# Patient Record
Sex: FEMALE | Race: WHITE | NOT HISPANIC OR LATINO | Employment: OTHER | URBAN - METROPOLITAN AREA
[De-identification: names, ages, dates, MRNs, and addresses within clinical notes are randomized per-mention and may not be internally consistent; named-entity substitution may affect disease eponyms.]

---

## 2018-02-09 ENCOUNTER — APPOINTMENT (OUTPATIENT)
Dept: WOMENS IMAGING | Facility: HOSPITAL | Age: 83
End: 2018-02-09

## 2018-02-09 PROCEDURE — 77067 SCR MAMMO BI INCL CAD: CPT | Performed by: RADIOLOGY

## 2018-02-09 PROCEDURE — 77063 BREAST TOMOSYNTHESIS BI: CPT | Performed by: RADIOLOGY

## 2018-03-09 ENCOUNTER — OFFICE VISIT (OUTPATIENT)
Dept: ORTHOPEDIC SURGERY | Facility: CLINIC | Age: 83
End: 2018-03-09

## 2018-03-09 VITALS — TEMPERATURE: 97.8 F | WEIGHT: 138 LBS | HEIGHT: 65 IN | BODY MASS INDEX: 22.99 KG/M2

## 2018-03-09 DIAGNOSIS — M25.511 RIGHT SHOULDER PAIN, UNSPECIFIED CHRONICITY: Primary | ICD-10-CM

## 2018-03-09 PROCEDURE — 20610 DRAIN/INJ JOINT/BURSA W/O US: CPT | Performed by: ORTHOPAEDIC SURGERY

## 2018-03-09 PROCEDURE — 73030 X-RAY EXAM OF SHOULDER: CPT | Performed by: ORTHOPAEDIC SURGERY

## 2018-03-09 PROCEDURE — 99203 OFFICE O/P NEW LOW 30 MIN: CPT | Performed by: ORTHOPAEDIC SURGERY

## 2018-03-09 RX ORDER — ALENDRONATE SODIUM 70 MG/1
TABLET ORAL
COMMUNITY
Start: 2018-03-01 | End: 2019-09-09

## 2018-03-09 RX ADMIN — LIDOCAINE HYDROCHLORIDE 2 ML: 10 INJECTION, SOLUTION INFILTRATION; PERINEURAL at 21:15

## 2018-03-09 RX ADMIN — METHYLPREDNISOLONE ACETATE 160 MG: 80 INJECTION, SUSPENSION INTRA-ARTICULAR; INTRALESIONAL; INTRAMUSCULAR; SOFT TISSUE at 21:15

## 2018-03-11 RX ORDER — METHYLPREDNISOLONE ACETATE 80 MG/ML
160 INJECTION, SUSPENSION INTRA-ARTICULAR; INTRALESIONAL; INTRAMUSCULAR; SOFT TISSUE
Status: COMPLETED | OUTPATIENT
Start: 2018-03-09 | End: 2018-03-09

## 2018-03-11 RX ORDER — LIDOCAINE HYDROCHLORIDE 10 MG/ML
2 INJECTION, SOLUTION INFILTRATION; PERINEURAL
Status: COMPLETED | OUTPATIENT
Start: 2018-03-09 | End: 2018-03-09

## 2018-03-12 ENCOUNTER — TELEPHONE (OUTPATIENT)
Dept: ORTHOPEDIC SURGERY | Facility: CLINIC | Age: 83
End: 2018-03-12

## 2018-03-12 NOTE — PROGRESS NOTES
Patient: Barbie Hernandez    YOB: 1932    Medical Record Number: [id number]    Chief Complaints:   Right shoulder pain    History of Present Illness:     85 y.o. female patient who comes in today for evaluation of her right shoulder.  About a year ago she was having some problems with the shoulder.  She rested it and it went away.  About 2 months ago, she started to have pain in the shoulder again.  She tells me that she took a turmeric tea and her symptoms resolved.  Roughly 2 days ago, she started to have pain in the shoulder yet again.  She localizes her symptoms to the side of the shoulder.  It is worse with reaching and lifting.  She describes her pain as moderate to severe, intermittent, and both aching and burning.  Rest and Tylenol have helped tremendously.    Allergies:   Allergies   Allergen Reactions   • Ciprofloxacin Rash   • Medrol [Methylprednisolone] Rash     Unknown    • Naproxen Rash   • Sulfa Antibiotics Nausea Only   • Vancomycin Rash       Home Medications:    Current Outpatient Prescriptions:   •  alendronate (FOSAMAX) 70 MG tablet, , Disp: , Rfl:   •  CALCIUM PO, Take  by mouth., Disp: , Rfl:   •  Cholecalciferol (VITAMIN D3) 5000 UNITS capsule capsule, Take 5,000 Units by mouth daily., Disp: , Rfl:   •  levothyroxine (SYNTHROID, LEVOTHROID) 112 MCG tablet, Take 112 mcg by mouth daily., Disp: , Rfl:     Past Medical History:   Diagnosis Date   • Disease of thyroid gland    • Hyperlipidemia        No past surgical history on file.    Social History     Occupational History   • Not on file.     Social History Main Topics   • Smoking status: Never Smoker   • Smokeless tobacco: Not on file   • Alcohol use No   • Drug use: Unknown   • Sexual activity: Not on file      Social History     Social History Narrative   • No narrative on file       No family history on file.    Review of Systems:      Constitutional: Denies fever, shaking or chills   Eyes: Denies change in visual acuity  "  HEENT: Denies nasal congestion or sore throat   Respiratory: Denies cough or shortness of breath   Cardiovascular: Denies chest pain or edema  Endocrine: Denies tremors, palpitations, intolerance of heat or cold, polyuria, polydipsia.  GI: Denies abdominal pain, nausea, vomiting, bloody stools or diarrhea  : Denies frequency, urgency, incontinence, retention, or nocturia.  Musculoskeletal: Denies numbness tingling or loss of motor function except as above  Integument: Denies rash, lesion or ulceration   Neurologic: Denies headache or focal weakness, deficits  Heme: Denies epistaxis, spontaneous or excessive bleeding, epistaxis, hematuria, melena, fatigue, enlarged or tender lymph nodes.      All other pertinent positives and negatives as noted above in HPI.    Physical Exam: 85 y.o. female  Vitals:    03/09/18 1604   Temp: 97.8 °F (36.6 °C)   TempSrc: Temporal Artery    Weight: 62.6 kg (138 lb)   Height: 163.8 cm (64.5\")       General:  Patient is awake and alert.  Appears in no acute distress or discomfort.    Psych:  Affect and demeanor are appropriate.    Eyes:  Conjunctiva and sclera appear grossly normal.  Eyes track well and EOM seem to be intact.    Ears:  No gross abnormalities.  Hearing adequate for the exam.    Cardiovascular:  Regular rate and rhythm.    Lungs:  Good chest expansion.  Breathing unlabored.    Lymph:  No palpable adenopathy about neck or axilla.    Neck:  Supple.  Normal ROM.  Negative Spurling's for shoulder or arm pain.    Right upper extremity:  Skin benign and intact without evidence for swelling, masses or atrophy.  No palpable masses.  No focal areas of exquisite tenderness.  Full active ROM.  No evident instability or apprehension.  Positive Neer and Meneses impingement maneuvers.  Negative Speeds, Yergason's and active compression maneuvers.  Pain with resistive testing of elevation in scapular plane and external rotation.  Strength is difficult to assess but she seems to be " able to resist with at least 5 minus out of 5 strength.  Negative Hornblower's and ER lag sign.  Good strength in wrist and hand.  Intact sensation in arm, hand.  Palpable radial pulse with brisk cap refill.         Radiology:   AP, scapular Y, and axillary views of the right shoulder are ordered by myself and reviewed to evaluate the patient's complaint.  No comparison films are immediately available.  The x-rays show no obvious acute abnormalities, lesions, masses, significant degenerative changes, or other concerning findings.  The acromiohumeral interval is normal.  Glenoid version appears normal as well.    Assessment/Plan:   Right rotator cuff tendinitis    Discussed options in detail including conservative versus surgical options. I have recommended that we start with a conservative approach. With regards to conservative options, we discussed appropriate activity modifications, icing as needed, anti-inflammatories, physical therapy, and injections.  Patient has acknowledged understanding of this information and stated that she would like to try an injection.  The risks, benefits, and alternatives were discussed.  She consented.  If her symptoms persist and/or recur, I told her I will be happy to see her back.  Otherwise, she can follow-up as needed.    Large Joint Arthrocentesis  Date/Time: 3/9/2018 9:15 PM  Consent given by: patient  Site marked: site marked  Timeout: Immediately prior to procedure a time out was called to verify the correct patient, procedure, equipment, support staff and site/side marked as required   Supporting Documentation  Indications: pain and joint swelling   Procedure Details  Location: shoulder - R subacromial bursa  Preparation: Patient was prepped and draped in the usual sterile fashion  Needle size: 25 G  Approach: posterior  Medications administered: 2 mL lidocaine 1 %; 160 mg methylPREDNISolone acetate 80 MG/ML  Patient tolerance: patient tolerated the procedure well with no  immediate complications            Juliano Bañuelos MD    03/09/2018    CC to Clara Ann Pallares, MD

## 2018-03-12 NOTE — TELEPHONE ENCOUNTER
This is common and likely related to the steroids that we injected.  This should get better over the next day or so.  She can take Benadryl which will help.  She can use her arm normally.  If she does not get better with the shot, we can get a CT scan instead of an MRI.  Thanks.

## 2018-04-27 ENCOUNTER — OFFICE VISIT (OUTPATIENT)
Dept: ORTHOPEDIC SURGERY | Facility: CLINIC | Age: 83
End: 2018-04-27

## 2018-04-27 VITALS — BODY MASS INDEX: 24.41 KG/M2 | TEMPERATURE: 98.1 F | WEIGHT: 137.8 LBS | HEIGHT: 63 IN

## 2018-04-27 DIAGNOSIS — G89.29 CHRONIC RIGHT SHOULDER PAIN: Primary | ICD-10-CM

## 2018-04-27 DIAGNOSIS — M25.511 CHRONIC RIGHT SHOULDER PAIN: Primary | ICD-10-CM

## 2018-04-27 PROCEDURE — 99212 OFFICE O/P EST SF 10 MIN: CPT | Performed by: ORTHOPAEDIC SURGERY

## 2018-04-27 NOTE — PROGRESS NOTES
Chief Complaint:  Follow-up regarding right shoulder pain    HPI:  Mrs. Hernandez comes in today for follow-up of the right shoulder.  The last injection helped tremendously.  Her pain has now recurred.  He comes in today to discuss other options.    Exam:  Right shoulder is examined.  Skin is benign.  No areas of tenderness.  No effusion.  Good motion.  Positive impingement maneuvers.  She has discomfort with elevation in the scapular plane and 4 out of 5 strength    Imaging:  none    Assessment:  Right rotator cuff tear    Plan:  We discussed options for her.  She wants to get another injection.  I explained that this is too soon.  I recommend physical therapy.  She was given a referral for this.  I told her that I can reinject the shoulder in another month.    Juliano Bañuelos MD  04/27/2018

## 2018-05-10 ENCOUNTER — TREATMENT (OUTPATIENT)
Dept: PHYSICAL THERAPY | Facility: CLINIC | Age: 83
End: 2018-05-10

## 2018-05-10 DIAGNOSIS — M25.511 CHRONIC RIGHT SHOULDER PAIN: Primary | ICD-10-CM

## 2018-05-10 DIAGNOSIS — G89.29 CHRONIC RIGHT SHOULDER PAIN: Primary | ICD-10-CM

## 2018-05-10 PROCEDURE — G8985 CARRY GOAL STATUS: HCPCS | Performed by: PHYSICAL THERAPIST

## 2018-05-10 PROCEDURE — 97161 PT EVAL LOW COMPLEX 20 MIN: CPT | Performed by: PHYSICAL THERAPIST

## 2018-05-10 PROCEDURE — 97110 THERAPEUTIC EXERCISES: CPT | Performed by: PHYSICAL THERAPIST

## 2018-05-10 PROCEDURE — G8984 CARRY CURRENT STATUS: HCPCS | Performed by: PHYSICAL THERAPIST

## 2018-05-15 ENCOUNTER — TREATMENT (OUTPATIENT)
Dept: PHYSICAL THERAPY | Facility: CLINIC | Age: 83
End: 2018-05-15

## 2018-05-15 DIAGNOSIS — M25.511 CHRONIC RIGHT SHOULDER PAIN: Primary | ICD-10-CM

## 2018-05-15 DIAGNOSIS — G89.29 CHRONIC RIGHT SHOULDER PAIN: Primary | ICD-10-CM

## 2018-05-15 PROCEDURE — 97530 THERAPEUTIC ACTIVITIES: CPT | Performed by: PHYSICAL THERAPIST

## 2018-05-15 PROCEDURE — 97140 MANUAL THERAPY 1/> REGIONS: CPT | Performed by: PHYSICAL THERAPIST

## 2018-05-15 PROCEDURE — 97110 THERAPEUTIC EXERCISES: CPT | Performed by: PHYSICAL THERAPIST

## 2018-05-15 NOTE — PROGRESS NOTES
Physical Therapy Daily Progress Note  2 treatments  Subjective     Barbie Hernandez reports: Patient reports that she has minimal pain over the weekend but that her shoulder is having increased pain on this date. She was only to do about half of her HEP but she did note a significant improvement.         Objective   See Exercise, Manual, and Modality Logs for complete treatment.   AROM and AAROM notably improved during wall slides today.     Assessment/Plan  Patient tolerated all treatment well and was able to tolerate progressions in therapeutic exercises with mild discomfort. Patient continues to need skilled therapy to improve R shoulder mobility, AROM, and activity tolerance. Patient is progressing well at this time. Will continue to advance POC as tolerated.     Progress per Plan of Care and Progress strengthening /stabilization /functional activity           Manual Therapy:    8     mins  49514;  Therapeutic Exercise:    20     mins  32015;     Neuromuscular Francesco:        mins  82963;    Therapeutic Activity:     10     mins  82911;     Gait Training:           mins  12753;     Ultrasound:          mins  33250;    Electrical Stimulation:         mins  07138 ( );  Dry Needling          mins self-pay    Timed Treatment:   38   mins   Total Treatment:     42   mins    David Guadarrama PT DPT  Physical Therapist  [place] License # [id number]

## 2018-05-17 ENCOUNTER — OFFICE VISIT (OUTPATIENT)
Dept: PHYSICAL THERAPY | Facility: CLINIC | Age: 83
End: 2018-05-17

## 2018-05-17 DIAGNOSIS — M25.511 CHRONIC RIGHT SHOULDER PAIN: Primary | ICD-10-CM

## 2018-05-17 DIAGNOSIS — G89.29 CHRONIC RIGHT SHOULDER PAIN: Primary | ICD-10-CM

## 2018-05-17 PROCEDURE — 97110 THERAPEUTIC EXERCISES: CPT | Performed by: PHYSICAL THERAPIST

## 2018-05-17 PROCEDURE — 97530 THERAPEUTIC ACTIVITIES: CPT | Performed by: PHYSICAL THERAPIST

## 2018-05-17 NOTE — PROGRESS NOTES
"Physical Therapy Daily Progress Note    Time In 0958  Time Out 1054    Barbie Hernandez reports: shoulder feels a little \"looser\" but still uncomfortable with certain positions/movements.    Subjective     Objective   See Exercise, Manual, and Modality Logs for complete treatment.   Added cane assisted ER and IR x 10 reps each direction.  Verbal and tactile cues to ensure proper scapular postioning with band activities.      Assessment/Plan  Mild decrease in subjective complaints of shoulder discomfort.  Benefits from verbal/tactile cues to ensure proper scapular positioning with exercises and activities. Able to perform exercises for affected musculature without increased symptoms or discomfort though limitations in ROM still evident with certain AA/PROM planes of movement.    Progress strengthening /stabilization /functional activity as symptoms allow           Manual Therapy:  8     mins  85475;  Therapeutic Exercise:    28   mins  39364;     Neuromuscular Francesco:        mins  44207;    Therapeutic Activity:    10    mins  29407;     Gait Training:         mins  17161;     Ultrasound:          mins  85881;    Electrical Stimulation:      mins  83404 ( );      Timed Treatment:   46   mins   Total Treatment:     54   mins    Hardy Macedo PTA    Physical Therapist Assistant [place] [facility]    "

## 2018-05-22 ENCOUNTER — OFFICE VISIT (OUTPATIENT)
Dept: PHYSICAL THERAPY | Facility: CLINIC | Age: 83
End: 2018-05-22

## 2018-05-22 DIAGNOSIS — G89.29 CHRONIC RIGHT SHOULDER PAIN: Primary | ICD-10-CM

## 2018-05-22 DIAGNOSIS — M25.511 CHRONIC RIGHT SHOULDER PAIN: Primary | ICD-10-CM

## 2018-05-22 PROCEDURE — 97110 THERAPEUTIC EXERCISES: CPT | Performed by: PHYSICAL THERAPIST

## 2018-05-22 PROCEDURE — 97140 MANUAL THERAPY 1/> REGIONS: CPT | Performed by: PHYSICAL THERAPIST

## 2018-05-22 PROCEDURE — 97530 THERAPEUTIC ACTIVITIES: CPT | Performed by: PHYSICAL THERAPIST

## 2018-05-22 NOTE — PROGRESS NOTES
Physical Therapy Daily Progress Note    Time In 1110  Time Out 1215    Barbiehector Freirebarb reports: shoulder mobility seems a little better but still experiencing pain.    Subjective     Objective   See Exercise, Manual, and Modality Logs for complete treatment.   UBE 3 fwd/3 retro  3 way pulleys x 2 min each direction  Red t band scapular retraction rows, saws, bilateral shoulder extension 2 x 10 each   Added Bilateral scapular retraction/ER red t band x 10   Unilateral wall slides flexion x 10 and unilateral ball circles 20 cw/ccw.  Verbal and tactile cues regarding scapular posture/positioning with exercise      Assessment/Plan  Subjectively, patient reports mobility of shoulder mildly improved but pain seems unchanged.  Able to perform exercises as described above without increased symptoms/disomfort.  Does benefit from verbal/tactile cues regarding posture and scapular positioning.  PROM reveals good mobility without noted restrictions all planes.    Progress strengthening /stabilization /functional activity as symptoms allow           Manual Therapy:   10    mins  12660;  Therapeutic Exercise:   25      mins  34614;     Neuromuscular Francesco:      mins  72530;    Therapeutic Activity:    10    mins  19515;     Gait Training:         mins  99420;     Ultrasound:          mins  94483;    Electrical Stimulation:         mins  56076 ( );      Timed Treatment: 45   mins   Total Treatment:  65    mins    Hardy Macedo PTA    Physical Therapist Assistant [place] [facility]

## 2018-05-24 ENCOUNTER — TREATMENT (OUTPATIENT)
Dept: PHYSICAL THERAPY | Facility: CLINIC | Age: 83
End: 2018-05-24

## 2018-05-24 DIAGNOSIS — M25.511 CHRONIC RIGHT SHOULDER PAIN: Primary | ICD-10-CM

## 2018-05-24 DIAGNOSIS — G89.29 CHRONIC RIGHT SHOULDER PAIN: Primary | ICD-10-CM

## 2018-05-24 PROCEDURE — 97110 THERAPEUTIC EXERCISES: CPT | Performed by: PHYSICAL THERAPIST

## 2018-05-24 PROCEDURE — 97140 MANUAL THERAPY 1/> REGIONS: CPT | Performed by: PHYSICAL THERAPIST

## 2018-05-24 PROCEDURE — 97530 THERAPEUTIC ACTIVITIES: CPT | Performed by: PHYSICAL THERAPIST

## 2018-05-24 NOTE — PROGRESS NOTES
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 She reports that her shoulder is feeling better and she is having less difficulty with ADLs on a regular basis.         Objective   See Exercise, Manual, and Modality Logs for complete treatment.       Assessment/Plan   Patient continues to have significant weakness in R shoulder RTC ER's. She is unable to elevate her arm against resistance but is able to complete wall slides now through a full ROM. She is progressing with her AROM and AAROM at this time and is improving shoulder function with current POC. Patient tolerated all treatment well and was able to tolerate progressions in therapeutic exercises with mild discomfort.  Will continue to advance POC as tolerated.     Progress per Plan of Care and Progress strengthening /stabilization /functional activity           Manual Therapy:    8     mins  05995;  Therapeutic Exercise:    16     mins  47327;     Neuromuscular Francesco:        mins  40723;    Therapeutic Activity:     16     mins  67540;     Gait Training:           mins  54221;     Ultrasound:          mins  22225;    Electrical Stimulation:         mins  65551 ( );  Dry Needling          mins self-pay    Timed Treatment:   40   mins   Total Treatment:     42   mins    David Guadarrama PT DPT  Physical Therapist  [place] License # [id number]

## 2018-05-29 ENCOUNTER — TREATMENT (OUTPATIENT)
Dept: PHYSICAL THERAPY | Facility: CLINIC | Age: 83
End: 2018-05-29

## 2018-05-29 DIAGNOSIS — G89.29 CHRONIC RIGHT SHOULDER PAIN: Primary | ICD-10-CM

## 2018-05-29 DIAGNOSIS — M25.511 CHRONIC RIGHT SHOULDER PAIN: Primary | ICD-10-CM

## 2018-05-29 PROCEDURE — 97530 THERAPEUTIC ACTIVITIES: CPT | Performed by: PHYSICAL THERAPIST

## 2018-05-29 PROCEDURE — 97110 THERAPEUTIC EXERCISES: CPT | Performed by: PHYSICAL THERAPIST

## 2018-05-29 NOTE — PROGRESS NOTES
Physical Therapy Daily Progress Note  5 treatments  Subjective     Barbie Hernandez reports: patient reports that she has had laryngitis over the past week. she has also had no AC and has had to sleep on her couch to stay cool. Reports that her main shoulder pain is while driving.          Objective   See Exercise, Manual, and Modality Logs for complete treatment.       Assessment/Plan   Patient is improving on all exercises. Her ROM and strength is gradually increasing each treatment.  Patient tolerated all treatment well and was able to tolerate progressions in therapeutic exercises with minimal discomfort. Patient continues to need skilled therapy to improve shoulder strength, shoulder function, and shoulder pain. Patient is progressing well at this time. Will continue to advance POC as tolerated.     Progress per Plan of Care and Progress strengthening /stabilization /functional activity           Manual Therapy:         mins  42116;  Therapeutic Exercise:    18     mins  98737;     Neuromuscular Francesco:        mins  02970;    Therapeutic Activity:     20     mins  00971;     Gait Training:           mins  28495;     Ultrasound:          mins  44094;    Electrical Stimulation:         mins  49230 ( );  Dry Needling          mins self-pay    Timed Treatment:   38 mins   Total Treatment:     45   mins    David Guadarrama PT DPT  Physical Therapist  [place] License # [id number]

## 2018-05-31 ENCOUNTER — TREATMENT (OUTPATIENT)
Dept: PHYSICAL THERAPY | Facility: CLINIC | Age: 83
End: 2018-05-31

## 2018-05-31 DIAGNOSIS — G89.29 CHRONIC RIGHT SHOULDER PAIN: Primary | ICD-10-CM

## 2018-05-31 DIAGNOSIS — M25.511 CHRONIC RIGHT SHOULDER PAIN: Primary | ICD-10-CM

## 2018-05-31 PROCEDURE — 97110 THERAPEUTIC EXERCISES: CPT | Performed by: PHYSICAL THERAPIST

## 2018-05-31 NOTE — PROGRESS NOTES
Physical Therapy Daily Progress Note  7 treatments  Subjective     Barbie Hernandez reports: Patient reports that she is having a HA today and is worried that her laryngitis is not gone. She is feeling a little tired.         Objective   See Exercise, Manual, and Modality Logs for complete treatment.    All TE were performed on this date as listed in the program notes in patient instructions.     Went over HEP with patient and organized her program for exercises that she is to continue progressing with once therapy is over.   Assessment/Plan  Patient tolerated all treatment well and was able to tolerate progressions in therapeutic exercises with mild discomfort. R shoulder ROM is improving. Patient was instructed to perform this routine again prior to her next appointment to assess (I) with TE. Will continue to advance POC as tolerated.     Progress per Plan of Care and Progress strengthening /stabilization /functional activity           Manual Therapy:         mins  20435;  Therapeutic Exercise:    42     mins  12831;     Neuromuscular Francesco:        mins  40905;    Therapeutic Activity:          mins  91882;     Gait Training:           mins  56647;     Ultrasound:          mins  17026;    Electrical Stimulation:         mins  43130 ( );  Dry Needling          mins self-pay    Timed Treatment:   42   mins   Total Treatment:     45   mins    David Guadarrama PT DPT  Physical Therapist  [place] License # [id number]

## 2018-05-31 NOTE — PATIENT INSTRUCTIONS
Access Code: [email or web address]   URL: [email or web address]   Date: 05/31/2018   Prepared by: David Guadarrama     Exercises  Seated Shoulder Scaption AAROM with Pulley at Side - 40 reps - 1 sets - 2 hold - 1x daily - 5x weekly  Sidelying Shoulder External Rotation - 12 reps - 2 sets - 2 hold - 1x daily - 5x weekly  Seated Shoulder Flexion AAROM with Dowel - 12 reps - 2 sets - 2 hold - 1x daily - 5x weekly  Shoulder Scaption AAROM with Dowel - 12 reps - 2 sets - 2 hold - 1x daily - 5x weekly  Shoulder Extension with Resistance - 12 reps - 2 sets - 2 hold - 1x daily - 5x weekly  Standing Shoulder Row with Anchored Resistance - 12 reps - 2 sets - 2 hold - 1x daily - 5x weekly  Standing Bicep Curls with Resistance - 12 reps - 2 sets - 2 hold - 1x daily - 5x weekly  Scaption Wall Slide with Towel - 12 reps - 2 sets - 2 hold - 1x daily                            - 5x weekly

## 2018-06-05 ENCOUNTER — TREATMENT (OUTPATIENT)
Dept: PHYSICAL THERAPY | Facility: CLINIC | Age: 83
End: 2018-06-05

## 2018-06-05 DIAGNOSIS — M25.511 CHRONIC RIGHT SHOULDER PAIN: Primary | ICD-10-CM

## 2018-06-05 DIAGNOSIS — G89.29 CHRONIC RIGHT SHOULDER PAIN: Primary | ICD-10-CM

## 2018-06-05 PROCEDURE — 97110 THERAPEUTIC EXERCISES: CPT | Performed by: PHYSICAL THERAPIST

## 2018-06-05 PROCEDURE — 97530 THERAPEUTIC ACTIVITIES: CPT | Performed by: PHYSICAL THERAPIST

## 2018-06-05 NOTE — PROGRESS NOTES
Physical Therapy Daily Progress Note  8 treatments  Subjective     Barbie Hernandez reports: She has been feeling well. Stayed busy over the weekend. Continues to have pain and weakness in her R shoulder. She has been noting improvement in her ability to reach out (putting key in ignition) without difficulty.         Objective   See Exercise, Manual, and Modality Logs for complete treatment.     Strength and AROM are gradually improving. Isolated strength continues to be very limited however functionally she is     Assessment/Plan  Added in SB rolling exercises. Patient tolerated all treatment well and was able to tolerate progressions in therapeutic exercises with minimal discomfort. Patient continues to need skilled therapy to improve shoulder strength, shoulder functional mobility, and pain. Patient is progressing gradually at this time. Will continue to advance POC as tolerated.     Progress per Plan of Care and Progress strengthening /stabilization /functional activity           Manual Therapy:         mins  62761;  Therapeutic Exercise:    24     mins  05278;     Neuromuscular Francesco:       mins  73624;    Therapeutic Activity:     18     mins  49899;     Gait Training:           mins  89004;     Ultrasound:    7      mins  16739;    Electrical Stimulation:         mins  28370 ( );  Dry Needling          mins self-pay    Timed Treatment:   49   mins   Total Treatment:     52   mins    David Guadarrama PT DPT  Physical Therapist  [place] License # [id number]

## 2018-06-07 ENCOUNTER — TREATMENT (OUTPATIENT)
Dept: PHYSICAL THERAPY | Facility: CLINIC | Age: 83
End: 2018-06-07

## 2018-06-07 DIAGNOSIS — G89.29 CHRONIC RIGHT SHOULDER PAIN: Primary | ICD-10-CM

## 2018-06-07 DIAGNOSIS — M25.511 CHRONIC RIGHT SHOULDER PAIN: Primary | ICD-10-CM

## 2018-06-07 PROCEDURE — 97530 THERAPEUTIC ACTIVITIES: CPT | Performed by: PHYSICAL THERAPIST

## 2018-06-07 PROCEDURE — 97110 THERAPEUTIC EXERCISES: CPT | Performed by: PHYSICAL THERAPIST

## 2018-06-07 NOTE — PROGRESS NOTES
Physical Therapy Daily Progress Note  9 treatments  Subjective     Barbie Hernandez reports: she was not sore after last treatment.         Objective   See Exercise, Manual, and Modality Logs for complete treatment.       Assessment/Plan  Patient continues to respond well to TE variety and not have post treatment soreness elevate from baseline. Isolated ER and flexion are still very limited. Patient tolerated all treatment well and was able to tolerate progressions in therapeutic exercises with mild discomfort. Patient continues to need skilled therapy to improve R shoulder strength and functional mobility. Patient is progressing gradually at this time. Will continue to advance POC as tolerated.     Progress per Plan of Care and Progress strengthening /stabilization /functional activity           Manual Therapy:         mins  78801;  Therapeutic Exercise:    16     mins  15981;     Neuromuscular Francesco:        mins  56172;    Therapeutic Activity:     18     mins  02719;     Gait Training:           mins  89617;     Ultrasound:          mins  30176;    Electrical Stimulation:         mins  27523 ( );  Dry Needling          mins self-pay    Timed Treatment:   34   mins   Total Treatment:     38   mins    David Guadarrama PT DPT  Physical Therapist  [place] License # [id number]

## 2018-06-08 ENCOUNTER — OFFICE VISIT (OUTPATIENT)
Dept: ORTHOPEDIC SURGERY | Facility: CLINIC | Age: 83
End: 2018-06-08

## 2018-06-08 VITALS — WEIGHT: 136.4 LBS | BODY MASS INDEX: 23.29 KG/M2 | TEMPERATURE: 98.1 F | HEIGHT: 64 IN

## 2018-06-08 DIAGNOSIS — M25.511 CHRONIC RIGHT SHOULDER PAIN: Primary | ICD-10-CM

## 2018-06-08 DIAGNOSIS — G89.29 CHRONIC RIGHT SHOULDER PAIN: Primary | ICD-10-CM

## 2018-06-08 PROCEDURE — 20610 DRAIN/INJ JOINT/BURSA W/O US: CPT | Performed by: ORTHOPAEDIC SURGERY

## 2018-06-08 RX ADMIN — LIDOCAINE HYDROCHLORIDE 2 ML: 20 INJECTION, SOLUTION EPIDURAL; INFILTRATION; INTRACAUDAL; PERINEURAL at 11:33

## 2018-06-08 RX ADMIN — METHYLPREDNISOLONE ACETATE 80 MG: 80 INJECTION, SUSPENSION INTRA-ARTICULAR; INTRALESIONAL; INTRAMUSCULAR; SOFT TISSUE at 11:33

## 2018-06-08 NOTE — PROGRESS NOTES
Ms. Hernandez comes in today wanting to get the right shoulder injected again.  She has no interest in pursuing further workup or intervention.  The risks, benefits, and alternatives to a repeat injection were discussed.  She consented.  Going forward, she will follow up as needed.    Large Joint Arthrocentesis  Date/Time: 6/8/2018 11:33 AM  Consent given by: patient  Site marked: site marked  Timeout: Immediately prior to procedure a time out was called to verify the correct patient, procedure, equipment, support staff and site/side marked as required   Supporting Documentation  Indications: pain   Procedure Details  Location: shoulder - R subacromial bursa  Preparation: Patient was prepped and draped in the usual sterile fashion  Needle gauge: 21 g.  Approach: posterior  Medications administered: 2 mL lidocaine PF 2% 2 %; 80 mg methylPREDNISolone acetate 80 MG/ML  Patient tolerance: patient tolerated the procedure well with no immediate complications

## 2018-06-09 RX ORDER — LIDOCAINE HYDROCHLORIDE 20 MG/ML
2 INJECTION, SOLUTION EPIDURAL; INFILTRATION; INTRACAUDAL; PERINEURAL
Status: COMPLETED | OUTPATIENT
Start: 2018-06-08 | End: 2018-06-08

## 2018-06-09 RX ORDER — METHYLPREDNISOLONE ACETATE 80 MG/ML
80 INJECTION, SUSPENSION INTRA-ARTICULAR; INTRALESIONAL; INTRAMUSCULAR; SOFT TISSUE
Status: COMPLETED | OUTPATIENT
Start: 2018-06-08 | End: 2018-06-08

## 2018-07-11 ENCOUNTER — CONSULT (OUTPATIENT)
Dept: ORTHOPEDIC SURGERY | Facility: CLINIC | Age: 83
End: 2018-07-11

## 2018-07-11 VITALS — WEIGHT: 137.8 LBS | HEIGHT: 64 IN | BODY MASS INDEX: 23.52 KG/M2 | TEMPERATURE: 97.4 F

## 2018-07-11 DIAGNOSIS — M54.50 SPINE PAIN, LUMBAR: Primary | ICD-10-CM

## 2018-07-11 DIAGNOSIS — M48.061 SPINAL STENOSIS OF LUMBAR REGION, UNSPECIFIED WHETHER NEUROGENIC CLAUDICATION PRESENT: ICD-10-CM

## 2018-07-11 DIAGNOSIS — M41.9 ACQUIRED SCOLIOSIS: ICD-10-CM

## 2018-07-11 PROCEDURE — 72100 X-RAY EXAM L-S SPINE 2/3 VWS: CPT | Performed by: ORTHOPAEDIC SURGERY

## 2018-07-11 PROCEDURE — 99214 OFFICE O/P EST MOD 30 MIN: CPT | Performed by: ORTHOPAEDIC SURGERY

## 2018-07-11 NOTE — PROGRESS NOTES
"New patient or new problem visit    Chief Complaint   Patient presents with   • Lumbar Spine - Establish Care, Pain       HPI: She complains of \"gimpy leg\"but as she maintains a right leg Bear weight and hurts occasional pain on the other side some back pain as well in the past but none recently.  His go therapy was just started without much relief explore osteo-process    PFSH: See chart- reviewed    Review of Systems   Constitutional: Negative for activity change, appetite change, chills, diaphoresis, fatigue, fever and unexpected weight change.   HENT: Positive for hearing loss. Negative for congestion, nosebleeds, postnasal drip, sore throat, tinnitus and trouble swallowing.    Eyes: Positive for visual disturbance. Negative for pain.   Respiratory: Positive for shortness of breath. Negative for apnea, cough, chest tightness and wheezing.    Cardiovascular: Negative for chest pain and palpitations.   Gastrointestinal: Negative for abdominal pain, blood in stool, diarrhea, nausea and vomiting.   Genitourinary: Negative for difficulty urinating and dysuria.   Musculoskeletal: Positive for back pain and gait problem. Negative for arthralgias, joint swelling and myalgias.   Skin: Negative for color change and rash.   Neurological: Positive for numbness. Negative for light-headedness and headaches.   Hematological: Bruises/bleeds easily.   Psychiatric/Behavioral: Negative for agitation and sleep disturbance. The patient is not nervous/anxious.        PE: Constitutional: Vital signs above-noted.  Awake, alert and oriented    Psychiatric: Affect and insight do not appear grossly disturbed.    Pulmonary: Breathing is unlabored, color is good.    Skin: Warm, dry and normal turgor    Cardiac:  pedal pulses intact.  No edema.    Eyesight and hearing appear adequate for examination purposes      Musculoskeletal:  There is no tenderness to percussion and palpation of the spine. Motion appears undisturbed.  Posture is " unremarkable to coronal and sagittal inspection.    The skin about the area is intact.  There is no palpable or visible deformity.  There is no local spasm.       Neurologic:   Reflexes are 2+ and symmetrical in the patellae and absent in the Achilles.   Motor function is undisturbed in quadriceps, EHL, and gastrocnemius      Sensation appears symmetrically intact to light touch   .  In the bilateral lower extremities there is no evidence of atrophy.   Clonus is absent..  Gait appears undisturbed.  SLR test negative      MEDICAL DECISION MAKING    XRAY: Plain film x-rays of the lumbar spine scoliosis and multilevel disc degeneration but no obvious evidence of fracture.  Comparison views are available.    Other: n/a    Impression: He probably has lumbar spinal stenosis and she certainly has disc degeneration and scoliosis.  She can't get the MRI scan because of the indwelling pacemaker I don't think she's going to want surgery so for now I'll were going to simply send her for epidural injections.  If she fails to improve a myelogram and CT scan will be useful for further diagnosis    Plan: As above

## 2018-08-03 ENCOUNTER — ANESTHESIA (OUTPATIENT)
Dept: PAIN MEDICINE | Facility: HOSPITAL | Age: 83
End: 2018-08-03

## 2018-08-03 ENCOUNTER — ANESTHESIA EVENT (OUTPATIENT)
Dept: PAIN MEDICINE | Facility: HOSPITAL | Age: 83
End: 2018-08-03

## 2018-08-03 ENCOUNTER — HOSPITAL ENCOUNTER (OUTPATIENT)
Dept: GENERAL RADIOLOGY | Facility: HOSPITAL | Age: 83
Discharge: HOME | End: 2018-08-03

## 2018-08-03 ENCOUNTER — HOSPITAL ENCOUNTER (OUTPATIENT)
Dept: PAIN MEDICINE | Facility: HOSPITAL | Age: 83
Discharge: HOME | End: 2018-08-03
Attending: ORTHOPAEDIC SURGERY | Admitting: ANESTHESIOLOGY

## 2018-08-03 VITALS
DIASTOLIC BLOOD PRESSURE: 72 MMHG | HEART RATE: 79 BPM | SYSTOLIC BLOOD PRESSURE: 119 MMHG | WEIGHT: 137 LBS | OXYGEN SATURATION: 96 % | RESPIRATION RATE: 16 BRPM | TEMPERATURE: 97.8 F | HEIGHT: 65 IN | BODY MASS INDEX: 22.82 KG/M2

## 2018-08-03 DIAGNOSIS — R52 PAIN: ICD-10-CM

## 2018-08-03 DIAGNOSIS — M48.061 SPINAL STENOSIS OF LUMBAR REGION, UNSPECIFIED WHETHER NEUROGENIC CLAUDICATION PRESENT: ICD-10-CM

## 2018-08-03 PROCEDURE — 0 IOPAMIDOL 41 % SOLUTION: Performed by: ANESTHESIOLOGY

## 2018-08-03 PROCEDURE — 25010000002 METHYLPREDNISOLONE PER 80 MG: Performed by: ANESTHESIOLOGY

## 2018-08-03 PROCEDURE — C1755 CATHETER, INTRASPINAL: HCPCS

## 2018-08-03 PROCEDURE — 77003 FLUOROGUIDE FOR SPINE INJECT: CPT

## 2018-08-03 RX ORDER — METHYLPREDNISOLONE ACETATE 80 MG/ML
80 INJECTION, SUSPENSION INTRA-ARTICULAR; INTRALESIONAL; INTRAMUSCULAR; SOFT TISSUE ONCE
Status: COMPLETED | OUTPATIENT
Start: 2018-08-03 | End: 2018-08-03

## 2018-08-03 RX ORDER — SODIUM CHLORIDE 0.9 % (FLUSH) 0.9 %
1-10 SYRINGE (ML) INJECTION AS NEEDED
Status: DISCONTINUED | OUTPATIENT
Start: 2018-08-03 | End: 2018-08-04 | Stop reason: HOSPADM

## 2018-08-03 RX ORDER — MIDAZOLAM HYDROCHLORIDE 1 MG/ML
1 INJECTION INTRAMUSCULAR; INTRAVENOUS AS NEEDED
Status: DISCONTINUED | OUTPATIENT
Start: 2018-08-03 | End: 2018-08-04 | Stop reason: HOSPADM

## 2018-08-03 RX ORDER — DEXAMETHASONE SODIUM PHOSPHATE 10 MG/ML
10 INJECTION, SOLUTION INTRAMUSCULAR; INTRAVENOUS ONCE
Status: DISCONTINUED | OUTPATIENT
Start: 2018-08-03 | End: 2018-08-03

## 2018-08-03 RX ORDER — LIDOCAINE HYDROCHLORIDE 10 MG/ML
1 INJECTION, SOLUTION INFILTRATION; PERINEURAL ONCE AS NEEDED
Status: DISCONTINUED | OUTPATIENT
Start: 2018-08-03 | End: 2018-08-04 | Stop reason: HOSPADM

## 2018-08-03 RX ORDER — CARVEDILOL 3.12 MG/1
3.12 TABLET ORAL 2 TIMES DAILY WITH MEALS
COMMUNITY

## 2018-08-03 RX ADMIN — IOPAMIDOL 10 ML: 408 INJECTION, SOLUTION INTRATHECAL at 11:26

## 2018-08-03 RX ADMIN — METHYLPREDNISOLONE ACETATE 80 MG: 80 INJECTION, SUSPENSION INTRA-ARTICULAR; INTRALESIONAL; INTRAMUSCULAR; SOFT TISSUE at 11:26

## 2018-08-03 NOTE — ANESTHESIA PROCEDURE NOTES
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  Needle Type:Tuohy  Needle Gauge:20 G  Aspiration:negative  Test Dose:negative  Medications:  Depomedrol:80 mg  Preservative Free Saline:2mL  Isovue:2mL  Comments:Lumbar epidural steroid injections performed at the L5-S1 level area there is no sedation administered.  Level was chosen she has fairly tight L4 5 level.  She has no specific dermatomal she relation of her pain.  Epidural steroid injections performed under fluoroscopic guidance.  There is good loss resistance to injection.  2 mL of Isovue were slowly injected with very faint spread of contrast media.  There is no return red blood cells or cerebral spinal fluid.  No pain with injection.  The epidural needles withdrawn.  Post Assessment:  Pt Tolerance:patient tolerated the procedure well with no apparent complications  Complications:no

## 2018-08-03 NOTE — H&P
[facility]    History and Physical    Patient Name: Barbie Hernandez  :  1932  MRN:  [id number]  Date of Admission: 8/3/2018    Subjective     Patient is a 85 y.o. female presents with chief complaint of acute low back and hips: right pain.  Onset of symptoms was gradual starting several months ago.  Symptoms are associated/aggravated by nothing in particular. Symptoms improve with nothing  She reports insidious onset of pain started several months ago.  It's low in her back and radiates into her right leg some.  Nothing specifically helps it.  She's had plain films of her back from Dr. Camacho's office which demonstrated from his notes degeneration.  She cannot have an MRI apparently because of pacemaker.  She reports an allergy to methylprednisolone however she is had her shoulder injected by Dr. Bañuelos without problem.  She is uncertain what the initial report of allergy was but clearly she isn't allergic reaction to methylprednisolone.  The following portions of the patients history were reviewed and updated as appropriate: current medications, allergies, past medical history, past surgical history, past family history, past social history and problem list                Objective     Past Medical History:   Past Medical History:   Diagnosis Date   • A-fib (CMS/[facility])    • Arthritis    • Disease of thyroid gland    • Hyperlipidemia      Past Surgical History:   Past Surgical History:   Procedure Laterality Date   • CATARACT EXTRACTION Bilateral    • COLON RESECTION      INTESTINAL OBBSTRUCTION   • PACEMAKER IMPLANTATION     • PYLOROMYOTOMY     • UMBILICAL HERNIA REPAIR       Family History:   Family History   Problem Relation Age of Onset   • Diabetes Son      Social History:   Social History   Substance Use Topics   • Smoking status: Never Smoker   • Smokeless tobacco: Never Used   • Alcohol use No       Vital Signs Range for the last 24 hours  Temperature: Temp:  [36.6 °C (97.8 °F)] 36.6 °C (97.8 °F)  "  Temp Source: Temp src: Oral   BP: BP: (138)/(88) 138/88   Pulse: Heart Rate:  [78] 78   Respirations: Resp:  [16] 16   SPO2: SpO2:  [98 %] 98 %   O2 Amount (l/min):     O2 Devices Device (Oxygen Therapy): room air   Weight: Weight:  [62.1 kg (137 lb)] 62.1 kg (137 lb)     Flowsheet Rows      First Filed Value   Admission Height  163.8 cm (64.5\") Documented at 08/03/2018 1015   Admission Weight  62.1 kg (137 lb) Documented at 08/03/2018 1015          --------------------------------------------------------------------------------    Current Outpatient Prescriptions   Medication Sig Dispense Refill   • alendronate (FOSAMAX) 70 MG tablet      • CALCIUM PO Take  by mouth.     • carvedilol (COREG) 3.125 MG tablet Take 3.125 mg by mouth 2 (Two) Times a Day With Meals.     • Cholecalciferol (VITAMIN D3) 5000 UNITS capsule capsule Take 5,000 Units by mouth daily.     • levothyroxine (SYNTHROID, LEVOTHROID) 112 MCG tablet Take 112 mcg by mouth daily.     • apixaban (ELIQUIS) 2.5 MG tablet tablet Take 2.5 mg by mouth 2 (Two) Times a Day.       Current Facility-Administered Medications   Medication Dose Route Frequency Provider Last Rate Last Dose   • iopamidol (ISOVUE-M 200) injection 41%  12 mL Epidural Once in imaging Jason Smyth MD       • lidocaine (XYLOCAINE) 1 % injection 1 mL  1 mL Intradermal Once PRN Jason Smyth MD       • methylPREDNISolone acetate (DEPO-medrol) injection 80 mg  80 mg Intra-articular Once Render, Jason Herrera MD       • midazolam (VERSED) injection 1 mg  1 mg Intravenous PRN Haja, Jason Herrera MD       • sodium chloride 0.9 % flush 1-10 mL  1-10 mL Intravenous PRN Jason Smyth MD           --------------------------------------------------------------------------------  Assessment/Plan      Anesthesia Evaluation           Pain impairs ability to perform ADLs: Sleeping and Ambulation       Airway   Mallampati: II  Dental      Pulmonary - negative pulmonary ROS and normal exam   " Cardiovascular     Rhythm: regular    (+) pacemaker pacemaker, dysrhythmias, hyperlipidemia,   (-) murmur, carotid bruits      Neuro/Psych  (+)   right straight leg raise test,    GI/Hepatic/Renal/Endo - negative ROS     Musculoskeletal     (+) AYUSH test,   Abdominal    Substance History      OB/GYN          Other                   Diagnosis and Plan    Treatment Plan  Diagnosis     * Lumbago [M54.5]     * Degeneration of lumbar intervertebral disc [M51.36]     * Lumbar neuritis [M54.16]

## 2018-08-22 ENCOUNTER — OFFICE VISIT (OUTPATIENT)
Dept: FAMILY MEDICINE CLINIC | Facility: CLINIC | Age: 83
End: 2018-08-22

## 2018-08-22 VITALS
DIASTOLIC BLOOD PRESSURE: 70 MMHG | WEIGHT: 135 LBS | HEIGHT: 64 IN | OXYGEN SATURATION: 98 % | TEMPERATURE: 98.5 F | BODY MASS INDEX: 23.05 KG/M2 | HEART RATE: 78 BPM | SYSTOLIC BLOOD PRESSURE: 116 MMHG

## 2018-08-22 DIAGNOSIS — E03.8 OTHER SPECIFIED HYPOTHYROIDISM: Primary | ICD-10-CM

## 2018-08-22 DIAGNOSIS — I48.0 PAROXYSMAL ATRIAL FIBRILLATION (HCC): ICD-10-CM

## 2018-08-22 PROBLEM — M81.0 OSTEOPOROSIS: Status: ACTIVE | Noted: 2018-08-22

## 2018-08-22 PROBLEM — M54.50 LOWER BACK PAIN: Status: ACTIVE | Noted: 2018-08-22

## 2018-08-22 PROBLEM — R76.11 PPD POSITIVE: Status: ACTIVE | Noted: 2018-08-22

## 2018-08-22 PROCEDURE — 99214 OFFICE O/P EST MOD 30 MIN: CPT | Performed by: FAMILY MEDICINE

## 2018-08-22 RX ORDER — WARFARIN SODIUM 5 MG/1
5 TABLET ORAL SEE ADMIN INSTRUCTIONS
COMMUNITY
Start: 2018-08-15 | End: 2019-11-20

## 2018-08-22 NOTE — PROGRESS NOTES
Barbie Hernandez is a 85 y.o. female.     Chief Complaint   Patient presents with   • Establish Care     new pt establishing today in office with dr oconnor   • Hypothyroidism       HPI     Pt is a pleasant 85 y.o. YO female here for hypothyroidism and A.fib.  She is a new patient to me and this office.  PMH includes atrial fibrillation well controlled - on Coreg and requests as anticoagulation, hyperlipidemia well-controlled, osteoporosis well-controlled on alendronate, thyroid disease well controlled on levothyroxine (TSH 1.51 on 3/11/18).  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     Records reviewed from last PCP: Last TSH was preformed 3/2018 with good renal function.  Mild hyperglycemia (118) with normal Hb A1c.     Recently diagnosed with atrial fibrillation with workup for epidural for back pain.  She was started on carvedilol with good improvement of rhythm, was started on liquids for anticoagulation but did not started at this it was too expensive.  She was started on warfarin last week and will be having her first INR next week.  She is having no spontaneous bleeding, no blood in stools.  Tolerating well, aware of vitamin K foods.    Colonoscopy: Done at [facility] last year, one and only.   Mammogram: negative, Mom/ MGM/ niece with breast cancer, sister with ovarian cancer     The following portions of the patient's history were reviewed and updated as appropriate: allergies, current medications, past family history, past medical history, past social history, past surgical history and problem list.    Review of Systems   Constitutional: Negative for fever and unexpected weight change.   HENT: Negative for dental problem.    Respiratory: Negative for shortness of breath.    Cardiovascular: Negative for chest pain.   Gastrointestinal: Negative for blood in stool.   Genitourinary: Negative for dysuria.   Skin: Negative for rash.   Allergic/Immunologic: Negative for  environmental allergies.   Neurological: Negative for syncope.   Psychiatric/Behavioral: The patient is not nervous/anxious.    All other systems reviewed and are negative.      Objective  Vitals:    08/22/18 1320   BP: 116/70   Pulse: 78   Temp: 98.5 °F (36.9 °C)   SpO2: 98%        Physical Exam   Constitutional: She is oriented to person, place, and time. She appears well-developed and well-nourished. No distress.   HENT:   Head: Normocephalic.   Nose: Nose normal.   Eyes: EOM are normal.   Cardiovascular: Normal rate, regular rhythm, normal heart sounds and intact distal pulses.    No murmur heard.  Pulmonary/Chest: Effort normal and breath sounds normal. No respiratory distress.   Musculoskeletal: Normal range of motion.   Neurological: She is alert and oriented to person, place, and time.   Skin: Skin is warm and dry. No rash noted.   Psychiatric: She has a normal mood and affect. Her behavior is normal. Judgment and thought content normal.   Nursing note and vitals reviewed.        Current Outpatient Prescriptions:   •  alendronate (FOSAMAX) 70 MG tablet, , Disp: , Rfl:   •  CALCIUM PO, Take  by mouth., Disp: , Rfl:   •  carvedilol (COREG) 3.125 MG tablet, Take 3.125 mg by mouth 2 (Two) Times a Day With Meals., Disp: , Rfl:   •  levothyroxine (SYNTHROID, LEVOTHROID) 112 MCG tablet, Take 112 mcg by mouth daily., Disp: , Rfl:   •  warfarin (COUMADIN) 5 MG tablet, Take 5 mg by mouth Daily., Disp: , Rfl:   •  Cholecalciferol (VITAMIN D3) 5000 UNITS capsule capsule, Take 5,000 Units by mouth daily., Disp: , Rfl:     Procedures    Lab Results (most recent)     None              Barbie was seen today for establish care and hypothyroidism.    Diagnoses and all orders for this visit:    Other specified hypothyroidism    Paroxysmal atrial fibrillation (CMS/HCC)    Hypothyroidism well controlled, continue current meds.  Her last TSH in March was stable.  Repeat this fall.      Paroxysmal atrial fibrillation, new  diagnosis.  She is being followed by cardiology.  She could not afford Eliquis so she was started on warfarin last week.  Her first INR will be scheduled for next week.  Currently managed by her cardiologist.  Her last EKG showed that she was in sinus rhythm and seemed to respond to the carvedilol well.    Labs last 3/2018 - do in fall again.     Return in about 3 months (around 11/22/2018), or if symptoms worsen or fail to improve, for Medicare Wellness.      Tika Machado MD

## 2018-09-07 ENCOUNTER — HOSPITAL ENCOUNTER (OUTPATIENT)
Dept: PAIN MEDICINE | Facility: HOSPITAL | Age: 83
Discharge: HOME | End: 2018-09-07

## 2018-09-10 ENCOUNTER — ANESTHESIA (OUTPATIENT)
Dept: PAIN MEDICINE | Facility: HOSPITAL | Age: 83
End: 2018-09-10

## 2018-09-10 ENCOUNTER — ANESTHESIA EVENT (OUTPATIENT)
Dept: PAIN MEDICINE | Facility: HOSPITAL | Age: 83
End: 2018-09-10

## 2018-09-10 ENCOUNTER — HOSPITAL ENCOUNTER (OUTPATIENT)
Dept: GENERAL RADIOLOGY | Facility: HOSPITAL | Age: 83
Discharge: HOME | End: 2018-09-10

## 2018-09-10 ENCOUNTER — HOSPITAL ENCOUNTER (OUTPATIENT)
Dept: PAIN MEDICINE | Facility: HOSPITAL | Age: 83
Discharge: HOME | End: 2018-09-10
Admitting: ANESTHESIOLOGY

## 2018-09-10 VITALS
OXYGEN SATURATION: 98 % | RESPIRATION RATE: 16 BRPM | SYSTOLIC BLOOD PRESSURE: 117 MMHG | HEART RATE: 67 BPM | DIASTOLIC BLOOD PRESSURE: 78 MMHG | TEMPERATURE: 97.3 F

## 2018-09-10 DIAGNOSIS — R52 PAIN: ICD-10-CM

## 2018-09-10 PROCEDURE — 25010000002 METHYLPREDNISOLONE PER 80 MG: Performed by: ANESTHESIOLOGY

## 2018-09-10 PROCEDURE — 0 IOPAMIDOL 41 % SOLUTION: Performed by: ANESTHESIOLOGY

## 2018-09-10 PROCEDURE — C1755 CATHETER, INTRASPINAL: HCPCS

## 2018-09-10 PROCEDURE — 77003 FLUOROGUIDE FOR SPINE INJECT: CPT

## 2018-09-10 RX ORDER — METHYLPREDNISOLONE ACETATE 80 MG/ML
80 INJECTION, SUSPENSION INTRA-ARTICULAR; INTRALESIONAL; INTRAMUSCULAR; SOFT TISSUE ONCE
Status: COMPLETED | OUTPATIENT
Start: 2018-09-10 | End: 2018-09-10

## 2018-09-10 RX ORDER — SODIUM CHLORIDE 0.9 % (FLUSH) 0.9 %
1-10 SYRINGE (ML) INJECTION AS NEEDED
Status: DISCONTINUED | OUTPATIENT
Start: 2018-09-10 | End: 2018-09-11 | Stop reason: HOSPADM

## 2018-09-10 RX ADMIN — METHYLPREDNISOLONE ACETATE 80 MG: 80 INJECTION, SUSPENSION INTRA-ARTICULAR; INTRALESIONAL; INTRAMUSCULAR; SOFT TISSUE at 09:04

## 2018-09-10 RX ADMIN — IOPAMIDOL 10 ML: 408 INJECTION, SOLUTION INTRATHECAL at 09:04

## 2018-09-10 NOTE — ANESTHESIA PROCEDURE NOTES
PAIN Epidural block    Patient location during procedure: pain clinic  Start Time: 9/10/2018 8:49 AM  Stop Time: 9/10/2018 9:06 AM  Indication:procedure for pain  Performed By  Anesthesiologist: MISAEL GAONA  Preanesthetic Checklist  Completed: patient identified, site marked, surgical consent, pre-op evaluation, timeout performed, risks and benefits discussed and monitors and equipment checked  Additional Notes  Post-Op Diagnosis Codes:     * Lumbago (M54.5)     * Degeneration of lumbar intervertebral disc (M51.36)     * Lumbar radiculopathy (M54.16)  Prep:  Pt Position:prone  Sterile Tech:cap, gloves, mask and sterile barrier  Prep:chlorhexidine gluconate and isopropyl alcohol  Monitoring:blood pressure monitoring, continuous pulse oximetry and EKG  Procedure:  Sedation: no   Approach:right paramedian  Guidance: fluoroscopy  Location:lumbar  Interspace: Interlaminar L5-S1.  Needle Type:Tuohy  Needle Gauge:20 G  Aspiration:negative  Medications:  Depomedrol:80 mg  Preservative Free Saline:3mL  Isovue:2mL  Comments:Epidural spread of contrast  Post Assessment:  Dressing:occlusive dressing applied  Pt Tolerance:patient tolerated the procedure well with no apparent complications  Complications:no

## 2018-09-10 NOTE — H&P
[facility]    History and Physical    Patient Name: Barbie Hernandez  :  1932  MRN:  [id number]  Date of Admission: 9/10/2018    Subjective     The patient is an 85-year-old female with pain in her lower back which occasionally radiates to her right lower extremity.  She reports approximate 85% relief following her last lumbar epidural steroid injection.  However, her pain is gradually returned.  Today she rates her pain as a 7/10.  She is here for lumbar epidural steroid injection #2.    The following portions of the patients history were reviewed and updated as appropriate: current medications, allergies, past medical history, past surgical history, past family history, past social history and problem list                Objective     Past Medical History:   Past Medical History:   Diagnosis Date   • A-fib (CMS/HCC)    • Arthritis    • Disease of thyroid gland    • Hyperlipidemia      Past Surgical History:   Past Surgical History:   Procedure Laterality Date   • CATARACT EXTRACTION Bilateral    • COLON RESECTION      INTESTINAL OBBSTRUCTION   • PACEMAKER IMPLANTATION     • PYLOROMYOTOMY     • UMBILICAL HERNIA REPAIR       Family History:   Family History   Problem Relation Age of Onset   • Breast cancer Mother    • OCD Mother         neurotic and recluse    • Lung cancer Father    • Diabetes type I Son    • Breast cancer Maternal Grandmother    • Breast cancer Other    • Ovarian cancer Sister    • Stroke Neg Hx    • Heart attack Neg Hx      Social History:   Social History   Substance Use Topics   • Smoking status: Never Smoker   • Smokeless tobacco: Never Used   • Alcohol use No       Vital Signs Range for the last 24 hours  Temperature: Temp:  [36.3 °C (97.3 °F)] 36.3 °C (97.3 °F)   Temp Source: Temp src: Oral   BP: BP: (148)/(82) 148/82   Pulse: Heart Rate:  [69] 69   Respirations: Resp:  [16] 16   SPO2: SpO2:  [97 %] 97 %   O2 Amount (l/min):     O2 Devices Device (Oxygen Therapy): room air    Weight:           --------------------------------------------------------------------------------    Current Outpatient Prescriptions   Medication Sig Dispense Refill   • alendronate (FOSAMAX) 70 MG tablet      • CALCIUM PO Take  by mouth.     • carvedilol (COREG) 3.125 MG tablet Take 3.125 mg by mouth 2 (Two) Times a Day With Meals.     • levothyroxine (SYNTHROID, LEVOTHROID) 112 MCG tablet Take 112 mcg by mouth daily.     • warfarin (COUMADIN) 5 MG tablet Take 5 mg by mouth Daily.       No current facility-administered medications for this encounter.        --------------------------------------------------------------------------------  Assessment/Plan      Anesthesia Evaluation     Patient summary reviewed and Nursing notes reviewed   NPO Solid Status: > 8 hours  NPO Liquid Status: > 2 hours           Airway   Mallampati: II  TM distance: >3 FB  Neck ROM: full  Dental - normal exam     Pulmonary - negative pulmonary ROS and normal exam    breath sounds clear to auscultation  Cardiovascular - normal exam    Rhythm: regular  Rate: normal    (+) dysrhythmias Paroxysmal Atrial Fib, hyperlipidemia,   (-) angina, orthopnea, PND, JONES      Neuro/Psych- negative ROS  (-) left straight leg raise test, right straight leg raise test  GI/Hepatic/Renal/Endo    (+)   hypothyroidism,     Musculoskeletal     Abdominal  - normal exam    Abdomen: soft.  Bowel sounds: normal.   Substance History - negative use     OB/GYN negative ob/gyn ROS         Other   (+) arthritis                Diagnosis and Plan    Treatment Plan  ASA 3   Patient has had previous injection/procedure with % improvement.   Procedures: Lumbar Epidural Steroid Injection(LESI), With fluoroscopy,       Anesthetic plan and risks discussed with patient.          Diagnosis     * Lumbago [M54.5]     * Degeneration of lumbar intervertebral disc [M51.36]     * Lumbar radiculopathy [M54.16]

## 2018-10-01 ENCOUNTER — TELEPHONE (OUTPATIENT)
Dept: ORTHOPEDIC SURGERY | Facility: CLINIC | Age: 83
End: 2018-10-01

## 2018-10-01 NOTE — TELEPHONE ENCOUNTER
Nothing else short of surgery is going to help this.  She may not be a candidate but we should go ahead and get a myelogram and CT scan of the lumbar spine for further evaluation.  Make a 15 minute follow-up in the office for after the myelogram and CT to discuss surgery.

## 2018-11-15 ENCOUNTER — OFFICE VISIT (OUTPATIENT)
Dept: FAMILY MEDICINE CLINIC | Facility: CLINIC | Age: 83
End: 2018-11-15

## 2018-11-15 VITALS
BODY MASS INDEX: 23.9 KG/M2 | WEIGHT: 140 LBS | TEMPERATURE: 98.2 F | OXYGEN SATURATION: 91 % | SYSTOLIC BLOOD PRESSURE: 110 MMHG | HEIGHT: 64 IN | DIASTOLIC BLOOD PRESSURE: 70 MMHG | HEART RATE: 71 BPM

## 2018-11-15 DIAGNOSIS — E55.9 HYPOVITAMINOSIS D: ICD-10-CM

## 2018-11-15 DIAGNOSIS — R79.9 ABNORMAL FINDING OF BLOOD CHEMISTRY: ICD-10-CM

## 2018-11-15 DIAGNOSIS — E03.8 OTHER SPECIFIED HYPOTHYROIDISM: ICD-10-CM

## 2018-11-15 DIAGNOSIS — Z00.00 MEDICARE ANNUAL WELLNESS VISIT, SUBSEQUENT: Primary | ICD-10-CM

## 2018-11-15 DIAGNOSIS — R26.89 BALANCE PROBLEM: ICD-10-CM

## 2018-11-15 DIAGNOSIS — I48.0 PAROXYSMAL ATRIAL FIBRILLATION (HCC): ICD-10-CM

## 2018-11-15 LAB
25(OH)D3+25(OH)D2 SERPL-MCNC: 44.8 NG/ML (ref 30–100)
ALBUMIN SERPL-MCNC: 3.7 G/DL (ref 3.5–5.2)
ALBUMIN/GLOB SERPL: 1.2 G/DL
ALP SERPL-CCNC: 61 U/L (ref 39–117)
ALT SERPL-CCNC: 16 U/L (ref 1–33)
AST SERPL-CCNC: 20 U/L (ref 1–32)
BASOPHILS # BLD AUTO: 0.02 10*3/MM3 (ref 0–0.2)
BASOPHILS NFR BLD AUTO: 0.3 % (ref 0–1.5)
BILIRUB SERPL-MCNC: 0.4 MG/DL (ref 0.1–1.2)
BUN SERPL-MCNC: 18 MG/DL (ref 8–23)
BUN/CREAT SERPL: 21.2 (ref 7–25)
CALCIUM SERPL-MCNC: 9.4 MG/DL (ref 8.6–10.5)
CHLORIDE SERPL-SCNC: 102 MMOL/L (ref 98–107)
CHOLEST SERPL-MCNC: 185 MG/DL (ref 0–200)
CO2 SERPL-SCNC: 25.9 MMOL/L (ref 22–29)
CREAT SERPL-MCNC: 0.85 MG/DL (ref 0.57–1)
EOSINOPHIL # BLD AUTO: 0.27 10*3/MM3 (ref 0–0.7)
EOSINOPHIL NFR BLD AUTO: 4.2 % (ref 0.3–6.2)
ERYTHROCYTE [DISTWIDTH] IN BLOOD BY AUTOMATED COUNT: 14.1 % (ref 11.7–13)
FOLATE SERPL-MCNC: 15.4 NG/ML (ref 4.78–24.2)
GLOBULIN SER CALC-MCNC: 3.1 GM/DL
GLUCOSE SERPL-MCNC: 80 MG/DL (ref 65–99)
HCT VFR BLD AUTO: 43.2 % (ref 35.6–45.5)
HDLC SERPL-MCNC: 67 MG/DL (ref 40–60)
HGB BLD-MCNC: 13.2 G/DL (ref 11.9–15.5)
IMM GRANULOCYTES # BLD: 0 10*3/MM3 (ref 0–0.03)
IMM GRANULOCYTES NFR BLD: 0 % (ref 0–0.5)
LDLC SERPL CALC-MCNC: 98 MG/DL (ref 0–100)
LYMPHOCYTES # BLD AUTO: 1.06 10*3/MM3 (ref 0.9–4.8)
LYMPHOCYTES NFR BLD AUTO: 16.4 % (ref 19.6–45.3)
MCH RBC QN AUTO: 29.9 PG (ref 26.9–32)
MCHC RBC AUTO-ENTMCNC: 30.6 G/DL (ref 32.4–36.3)
MCV RBC AUTO: 98 FL (ref 80.5–98.2)
MONOCYTES # BLD AUTO: 0.6 10*3/MM3 (ref 0.2–1.2)
MONOCYTES NFR BLD AUTO: 9.3 % (ref 5–12)
NEUTROPHILS # BLD AUTO: 4.52 10*3/MM3 (ref 1.9–8.1)
NEUTROPHILS NFR BLD AUTO: 69.8 % (ref 42.7–76)
PLATELET # BLD AUTO: 241 10*3/MM3 (ref 140–500)
POTASSIUM SERPL-SCNC: 4.5 MMOL/L (ref 3.5–5.2)
PROT SERPL-MCNC: 6.8 G/DL (ref 6–8.5)
RBC # BLD AUTO: 4.41 10*6/MM3 (ref 3.9–5.2)
SODIUM SERPL-SCNC: 140 MMOL/L (ref 136–145)
T4 FREE SERPL-MCNC: 1.53 NG/DL (ref 0.93–1.7)
TRIGL SERPL-MCNC: 100 MG/DL (ref 0–150)
TSH SERPL DL<=0.005 MIU/L-ACNC: 8.69 MIU/ML (ref 0.27–4.2)
VIT B12 SERPL-MCNC: 666 PG/ML (ref 211–946)
VLDLC SERPL CALC-MCNC: 20 MG/DL (ref 5–40)
WBC # BLD AUTO: 6.47 10*3/MM3 (ref 4.5–10.7)

## 2018-11-15 PROCEDURE — G0439 PPPS, SUBSEQ VISIT: HCPCS | Performed by: FAMILY MEDICINE

## 2018-11-15 NOTE — PROGRESS NOTES
QUICK REFERENCE INFORMATION:  The ABCs of the Annual Wellness Visit    Initial Medicare Wellness Visit    HEALTH RISK ASSESSMENT    9/28/1932    Recent Hospitalizations:  No hospitalization(s) within the last year..        Current Medical Providers:  Patient Care Team:  Tiak Machado MD as PCP - General (Family Medicine)  Torito Seymour MD as PCP - Claims Attributed        Smoking Status:  Social History     Tobacco Use   Smoking Status Never Smoker   Smokeless Tobacco Never Used       Alcohol Consumption:  Social History     Substance and Sexual Activity   Alcohol Use No       Depression Screen:   PHQ-2/PHQ-9 Depression Screening 11/15/2018   Little interest or pleasure in doing things 0   Feeling down, depressed, or hopeless 0   Total Score 0       Health Habits and Functional and Cognitive Screening:  Functional & Cognitive Status 11/15/2018   Do you have difficulty preparing food and eating? No   Do you have difficulty bathing yourself, getting dressed or grooming yourself? No   Do you have difficulty using the toilet? No   Do you have difficulty moving around from place to place? Yes   Do you have trouble with steps or getting out of a bed or a chair? Yes   In the past year have you fallen or experienced a near fall? Yes   Current Diet Well Balanced Diet   Dental Exam Up to date   Eye Exam Up to date   Exercise (times per week) 2 times per week   Current Exercise Activities Include Aerobics   Do you need help using the phone?  Yes   Are you deaf or do you have serious difficulty hearing?  Yes   Do you need help with transportation? No   Do you need help shopping? No   Do you need help preparing meals?  No   Do you need help with housework?  Yes   Do you need help with laundry? No   Do you need help taking your medications? No   Do you need help managing money? No   Do you ever drive or ride in a car without wearing a seat belt? No   Have you felt unusual stress, anger or loneliness in the last month? No    Who do you live with? Alone   If you need help, do you have trouble finding someone available to you? No   Have you been bothered in the last four weeks by sexual problems? No   Do you have difficulty concentrating, remembering or making decisions? No           Does the patient have evidence of cognitive impairment? No    Asiprin use counseling: Does not need ASA (and currently is not on it)      Recent Lab Results:    Visual Acuity:  No exam data present    Age-appropriate Screening Schedule:  Refer to the list below for future screening recommendations based on patient's age, sex and/or medical conditions. Orders for these recommended tests are listed in the plan section. The patient has been provided with a written plan.    Health Maintenance   Topic Date Due   • LIPID PANEL  03/09/2018   • DXA SCAN  01/15/2019 (Originally 8/22/2018)   • TDAP/TD VACCINES (1 - Tdap) 11/13/2019 (Originally 9/28/1951)   • INFLUENZA VACCINE  Completed   • PNEUMOCOCCAL VACCINES (65+ LOW/MEDIUM RISK)  Completed   • ZOSTER VACCINE  Discontinued        Subjective   History of Present Illness    Barbie Hernandez is a 86 y.o. female who presents for an Annual Wellness Visit.    The following portions of the patient's history were reviewed and updated as appropriate: allergies, current medications, past family history, past medical history, past social history, past surgical history and problem list.    Outpatient Medications Prior to Visit   Medication Sig Dispense Refill   • alendronate (FOSAMAX) 70 MG tablet      • CALCIUM PO Take  by mouth.     • carvedilol (COREG) 3.125 MG tablet Take 3.125 mg by mouth 2 (Two) Times a Day With Meals.     • levothyroxine (SYNTHROID, LEVOTHROID) 112 MCG tablet Take 112 mcg by mouth daily.     • warfarin (COUMADIN) 5 MG tablet Take 5 mg by mouth Daily.       No facility-administered medications prior to visit.        Patient Active Problem List   Diagnosis   • Other specified hypothyroidism   •  Paroxysmal atrial fibrillation (CMS/[facility])   • PPD positive   • Lower back pain   • Osteoporosis       Advance Care Planning:  has an advance directive - a copy HAS NOT been provided. Have asked the patient to send this to us to add to record.    Identification of Risk Factors:  Risk factors include: vision limitations and hearing limitations.    Review of Systems   Constitutional: Negative for fever and unexpected weight change.   HENT: Positive for hearing loss. Negative for dental problem.    Respiratory: Negative for shortness of breath.    Cardiovascular: Negative for chest pain.   Gastrointestinal: Positive for diarrhea. Negative for blood in stool.   Genitourinary: Negative for dysuria.   Musculoskeletal: Positive for arthralgias, gait problem and myalgias.   Skin: Negative for rash.   Allergic/Immunologic: Negative for environmental allergies.   Neurological: Negative for dizziness and syncope.   Psychiatric/Behavioral: The patient is not nervous/anxious.        Compared to one year ago, the patient feels her physical health is worse increased falling, R arm pain with rotator cuff tendonitis, A.Fib now on anticoagulation, R pain with difficulty with driving and 2 steroid injections in her back with help.  Feels weaker.   Compared to one year ago, the patient feels her mental health is worse - some discouragement with feeling old quickly.    Objective     Physical Exam   Constitutional: She is oriented to person, place, and time. She appears well-developed and well-nourished. No distress.   HENT:   Head: Normocephalic.   Nose: Nose normal.   Eyes: EOM are normal.   Cardiovascular: Normal rate, regular rhythm, normal heart sounds and intact distal pulses.   No murmur heard.  Pulmonary/Chest: Effort normal and breath sounds normal. No respiratory distress.   Musculoskeletal: Normal range of motion.   Neurological: She is alert and oriented to person, place, and time.   Skin: Skin is warm and dry. No rash noted.  "  Psychiatric: She has a normal mood and affect. Her behavior is normal. Judgment and thought content normal.   Nursing note and vitals reviewed.      Vitals:    11/15/18 0924   BP: 110/70   Pulse: 71   Temp: 98.2 °F (36.8 °C)   TempSrc: Oral   SpO2: 91%   Weight: 63.5 kg (140 lb)   Height: 162.6 cm (64\")   PainSc:   4   PainLoc: Shoulder       Patient's Body mass index is 24.03 kg/m². BMI is within normal parameters. No follow-up required.      Assessment/Plan   Patient Self-Management and Personalized Health Advice  The patient has been provided with information about: diet and exercise and preventive services including:   · Advance directive, Counseling for cardiovascular disease risk reduction, Diabetes screening, see lab orders, Nutrition counseling provided.    Visit Diagnoses:    ICD-10-CM ICD-9-CM   1. Medicare annual wellness visit, subsequent Z00.00 V70.0   2. Paroxysmal atrial fibrillation (CMS/[facility]) I48.0 427.31   3. Other specified hypothyroidism E03.8 244.8   4. Balance problem R26.89 781.99   5. Hypovitaminosis D E55.9 268.9   6. Abnormal finding of blood chemistry  R79.9 790.6       Orders Placed This Encounter   Procedures   • TSH Rfx On Abnormal To Free T4   • Lipid Panel   • Comprehensive Metabolic Panel   • CBC Auto Differential   • Vitamin B12 & Folate   • Vitamin D 25 Hydroxy       Outpatient Encounter Medications as of 11/15/2018   Medication Sig Dispense Refill   • alendronate (FOSAMAX) 70 MG tablet      • CALCIUM PO Take  by mouth.     • carvedilol (COREG) 3.125 MG tablet Take 3.125 mg by mouth 2 (Two) Times a Day With Meals.     • levothyroxine (SYNTHROID, LEVOTHROID) 112 MCG tablet Take 112 mcg by mouth daily.     • warfarin (COUMADIN) 5 MG tablet Take 5 mg by mouth Daily.       No facility-administered encounter medications on file as of 11/15/2018.        Reviewed use of high risk medication in the elderly: yes  Reviewed for potential of harmful drug interactions in the elderly: " yes    Last year: R arm pain with rotator cuff tendonitis, A.Fib now on anticoagulation, R pain with difficulty with driving and 2 steroid injections in her back with help.  Did PT this year for balance. Will follow up with GYN for bone density.       Follow Up:  Return if symptoms worsen or fail to improve.     An After Visit Summary and PPPS with all of these plans were given to the patient.

## 2018-11-16 RX ORDER — LEVOTHYROXINE SODIUM 0.12 MG/1
125 TABLET ORAL DAILY
Qty: 30 TABLET | Refills: 2 | Status: SHIPPED | OUTPATIENT
Start: 2018-11-16 | End: 2018-11-19 | Stop reason: SDUPTHER

## 2018-11-16 NOTE — PROGRESS NOTES
Please call patient with results. Kidney and liver functions look normal. Diabetes screen is negative. Cholesterol is normal. Thyroid function low - I have sent a new prescription of thyroid function to the pharmacy.  Remaining labs are normal.  We can repeat thyroid labs in 8-12 weeks, please make apt with me in the office.   Thank You,    Tika Machado M.D.

## 2018-11-19 ENCOUNTER — TELEPHONE (OUTPATIENT)
Dept: FAMILY MEDICINE CLINIC | Facility: CLINIC | Age: 83
End: 2018-11-19

## 2018-11-19 RX ORDER — LEVOTHYROXINE SODIUM 0.12 MG/1
125 TABLET ORAL DAILY
Qty: 90 TABLET | Refills: 2 | Status: SHIPPED | OUTPATIENT
Start: 2018-11-19 | End: 2019-02-18 | Stop reason: SDUPTHER

## 2018-11-19 NOTE — TELEPHONE ENCOUNTER
May you please resend pt's levothyroxine Rx to pt's [facility]. [facility] Pharmacy has been updates in pt's chart.

## 2019-02-13 ENCOUNTER — APPOINTMENT (OUTPATIENT)
Dept: WOMENS IMAGING | Facility: HOSPITAL | Age: 84
End: 2019-02-13

## 2019-02-13 PROCEDURE — 77063 BREAST TOMOSYNTHESIS BI: CPT | Performed by: RADIOLOGY

## 2019-02-13 PROCEDURE — 77067 SCR MAMMO BI INCL CAD: CPT | Performed by: RADIOLOGY

## 2019-02-15 ENCOUNTER — OFFICE VISIT (OUTPATIENT)
Dept: FAMILY MEDICINE CLINIC | Facility: CLINIC | Age: 84
End: 2019-02-15

## 2019-02-15 VITALS
DIASTOLIC BLOOD PRESSURE: 62 MMHG | HEIGHT: 64 IN | OXYGEN SATURATION: 99 % | TEMPERATURE: 97.8 F | BODY MASS INDEX: 23.9 KG/M2 | RESPIRATION RATE: 14 BRPM | SYSTOLIC BLOOD PRESSURE: 116 MMHG | WEIGHT: 140 LBS | HEART RATE: 68 BPM

## 2019-02-15 DIAGNOSIS — I48.0 PAROXYSMAL ATRIAL FIBRILLATION (HCC): ICD-10-CM

## 2019-02-15 DIAGNOSIS — E03.8 OTHER SPECIFIED HYPOTHYROIDISM: Primary | ICD-10-CM

## 2019-02-15 PROCEDURE — 99214 OFFICE O/P EST MOD 30 MIN: CPT | Performed by: FAMILY MEDICINE

## 2019-02-15 NOTE — PROGRESS NOTES
Barbie Hernandez is a 86 y.o. female.     Chief Complaint   Patient presents with   • Hypothyroidism     Patient has a follow up on her thyroid levels.       HPI         The following portions of the patient's history were reviewed and updated as appropriate: allergies, current medications, past family history, past medical history, past social history, past surgical history and problem list.    Review of Systems   Constitutional: Positive for fatigue. Negative for activity change.   All other systems reviewed and are negative.      Objective  Vitals:    02/15/19 1312   BP: 116/62   Pulse: 68   Resp: 14   Temp: 97.8 °F (36.6 °C)   SpO2: 99%       Physical Exam      Current Outpatient Medications:   •  alendronate (FOSAMAX) 70 MG tablet, , Disp: , Rfl:   •  CALCIUM PO, Take  by mouth., Disp: , Rfl:   •  carvedilol (COREG) 3.125 MG tablet, Take 3.125 mg by mouth 2 (Two) Times a Day With Meals., Disp: , Rfl:   •  levothyroxine (SYNTHROID, LEVOTHROID) 125 MCG tablet, Take 1 tablet by mouth Daily., Disp: 90 tablet, Rfl: 2  •  warfarin (COUMADIN) 5 MG tablet, Take 5 mg by mouth Daily., Disp: , Rfl:   Current outpatient and discharge medications have been reconciled for the patient.  Reviewed by: Humberto Morataya MA      Procedures    Lab Results (most recent)     None                  There are no diagnoses linked to this encounter.      No Follow-up on file.      Humberto Morataya MA

## 2019-02-15 NOTE — PROGRESS NOTES
Barbie Hernandez is a 86 y.o. female.     Chief Complaint   Patient presents with   • Hypothyroidism     Patient has a follow up on her thyroid levels.       HPI     Pt is a pleasant 86 y.o. YO female here for Hypothyroidism and A.Fib.  Her hypothyroidism was not well controlled, TSH was 8.7 in November 2018, her dose of levothyroxine was increased to 125 mcg daily.  Since then she has felt significantly improved from a fatigue standpoint.  She is adherent with medication with no adverse effects.  Atrial fibrillation, new diagnosis.  Seen incidentally on routine screening from cardiologist.  She was started on Coumadin and continued on Coreg.  She is asymptomatic.    The following portions of the patient's history were reviewed and updated as appropriate: allergies, current medications, past family history, past medical history, past social history, past surgical history and problem list.    Review of Systems   Constitutional: Positive for fatigue.   Cardiovascular: Negative for palpitations.       Objective  Vitals:    02/15/19 1312   BP: 116/62   Pulse: 68   Resp: 14   Temp: 97.8 °F (36.6 °C)   SpO2: 99%        Physical Exam   Constitutional: She is oriented to person, place, and time. She appears well-developed and well-nourished. No distress.   HENT:   Head: Normocephalic.   Nose: Nose normal.   Eyes: EOM are normal. No scleral icterus.   Cardiovascular: Normal rate, regular rhythm, normal heart sounds and intact distal pulses.   No murmur heard.  Pulmonary/Chest: Effort normal and breath sounds normal. No respiratory distress.   Musculoskeletal: Normal range of motion.   Neurological: She is alert and oriented to person, place, and time.   Skin: Skin is warm and dry. No rash noted.   Psychiatric: She has a normal mood and affect. Her behavior is normal. Judgment and thought content normal.   Nursing note and vitals reviewed.        Current Outpatient Medications:   •  alendronate (FOSAMAX) 70 MG tablet, ,  Disp: , Rfl:   •  CALCIUM PO, Take  by mouth., Disp: , Rfl:   •  carvedilol (COREG) 3.125 MG tablet, Take 3.125 mg by mouth 2 (Two) Times a Day With Meals., Disp: , Rfl:   •  levothyroxine (SYNTHROID, LEVOTHROID) 125 MCG tablet, Take 1 tablet by mouth Daily., Disp: 90 tablet, Rfl: 2  •  warfarin (COUMADIN) 5 MG tablet, Take 5 mg by mouth See Admin Instructions. 7.5MG DAILY WITH 5 MWF, Disp: , Rfl:     Procedures    Lab Results (most recent)     None              Barbie was seen today for hypothyroidism.    Diagnoses and all orders for this visit:    Other specified hypothyroidism  -     Thyroid Panel With TSH    Paroxysmal atrial fibrillation (CMS/HCC)    Hypothyroidism previously uncontrolled, feeling improved with increased dose of levothyroxine.  Recheck level today.    Of paroxysmal atrial fibrillation.  She is now on Coumadin, but this has been updated.  She is taking 7.5 mg daily with 5 mg Monday Wednesday Friday.  Goal INR 2-3.  This is being followed by cardiology.  She is otherwise doing well with no complications.    Return if symptoms worsen or fail to improve.      Tika Machado MD

## 2019-02-16 LAB
FT4I SERPL CALC-MCNC: 2.9 (ref 1.2–4.9)
T3RU NFR SERPL: 35 % (ref 24–39)
T4 SERPL-MCNC: 8.2 UG/DL (ref 4.5–12)
TSH SERPL DL<=0.005 MIU/L-ACNC: 2.2 UIU/ML (ref 0.45–4.5)

## 2019-02-18 RX ORDER — LEVOTHYROXINE SODIUM 0.12 MG/1
125 TABLET ORAL DAILY
Qty: 90 TABLET | Refills: 2 | Status: SHIPPED | OUTPATIENT
Start: 2019-02-18 | End: 2019-11-11 | Stop reason: SDUPTHER

## 2019-02-18 NOTE — PROGRESS NOTES
Please call patient back with results.  The thyroid fun has resulted as nromal.  Please continue the same dose of medication. (Dee - please send in a script if she needs a new one, #90 day with 1 refill is OK)  Thank you

## 2019-02-19 ENCOUNTER — TRANSCRIBE ORDERS (OUTPATIENT)
Dept: ADMINISTRATIVE | Facility: HOSPITAL | Age: 84
End: 2019-02-19

## 2019-02-19 DIAGNOSIS — M81.0 SENILE OSTEOPOROSIS: Primary | ICD-10-CM

## 2019-03-06 ENCOUNTER — INFUSION (OUTPATIENT)
Dept: ONCOLOGY | Facility: HOSPITAL | Age: 84
End: 2019-03-06

## 2019-03-06 VITALS
TEMPERATURE: 98 F | BODY MASS INDEX: 24.48 KG/M2 | SYSTOLIC BLOOD PRESSURE: 144 MMHG | DIASTOLIC BLOOD PRESSURE: 74 MMHG | OXYGEN SATURATION: 91 % | WEIGHT: 142.6 LBS | HEART RATE: 107 BPM

## 2019-03-06 DIAGNOSIS — M81.0 AGE-RELATED OSTEOPOROSIS WITHOUT CURRENT PATHOLOGICAL FRACTURE: Primary | ICD-10-CM

## 2019-03-06 PROCEDURE — 96372 THER/PROPH/DIAG INJ SC/IM: CPT | Performed by: NURSE PRACTITIONER

## 2019-03-06 PROCEDURE — 25010000002 DENOSUMAB 60 MG/ML SOLUTION: Performed by: OBSTETRICS & GYNECOLOGY

## 2019-03-06 RX ADMIN — DENOSUMAB 60 MG: 60 INJECTION SUBCUTANEOUS at 14:47

## 2019-03-06 NOTE — PROGRESS NOTES
Arrived  for first time prolia injection. Indication and side effects reviewed. Prolia booklet given and reviewed. Denies recent dental work. Labs and medications verified discussed with pharmacistAshlie. Prolia administered in right arm without incidence. Instructed to call prescribing MD for any concerns or questions and instructed on how to schedule future appts.  Pt vu and discharged ambulatory, stable.

## 2019-06-19 ENCOUNTER — TELEPHONE (OUTPATIENT)
Dept: FAMILY MEDICINE CLINIC | Facility: CLINIC | Age: 84
End: 2019-06-19

## 2019-06-19 NOTE — TELEPHONE ENCOUNTER
I would advise treating with Mucinex, water and honey.  Continue with antibiotic and rest.  If it seems to be worsening or not improving I can work her in at the end of the week.  More than likely it will resolve with the treatment she has been given.  Thank you

## 2019-06-19 NOTE — TELEPHONE ENCOUNTER
Pt was evaluated for sore throat on 6/16 at [facility] urgent care. Strep was negative. Pt was prescribed amoxicillin. Pt is on third day of antibiotic and is no better, actually worse. No acute appointments left. Please advise.

## 2019-06-19 NOTE — TELEPHONE ENCOUNTER
Spoke with pt. She is aware. I informed pt to call us tomorrow afternoon if she is not feeling any better and we will fit her in on Friday with

## 2019-09-09 ENCOUNTER — INFUSION (OUTPATIENT)
Dept: ONCOLOGY | Facility: HOSPITAL | Age: 84
End: 2019-09-09

## 2019-09-09 VITALS
DIASTOLIC BLOOD PRESSURE: 80 MMHG | TEMPERATURE: 98.3 F | BODY MASS INDEX: 23.46 KG/M2 | WEIGHT: 138.8 LBS | HEART RATE: 90 BPM | SYSTOLIC BLOOD PRESSURE: 143 MMHG | OXYGEN SATURATION: 94 %

## 2019-09-09 DIAGNOSIS — M81.0 AGE-RELATED OSTEOPOROSIS WITHOUT CURRENT PATHOLOGICAL FRACTURE: Primary | ICD-10-CM

## 2019-09-09 PROCEDURE — 96372 THER/PROPH/DIAG INJ SC/IM: CPT | Performed by: NURSE PRACTITIONER

## 2019-09-09 PROCEDURE — 25010000002 DENOSUMAB 60 MG/ML SOLUTION PREFILLED SYRINGE: Performed by: NURSE PRACTITIONER

## 2019-09-09 RX ADMIN — DENOSUMAB 60 MG: 60 INJECTION SUBCUTANEOUS at 13:50

## 2019-09-09 NOTE — PROGRESS NOTES
Arrived  for prolia injection. Indication and side effects reviewed. Denies recent dental work. Labs and medications verified. Prolia administered in left arm without incidence. Instructed to call prescribing MD for any concerns or questions and instructed on how to schedule future appts.  Pt vu and discharged ambulatory.

## 2019-11-11 RX ORDER — LEVOTHYROXINE SODIUM 0.12 MG/1
TABLET ORAL
Qty: 90 TABLET | Refills: 0 | Status: SHIPPED | OUTPATIENT
Start: 2019-11-11 | End: 2020-02-07

## 2019-11-20 ENCOUNTER — OFFICE VISIT (OUTPATIENT)
Dept: FAMILY MEDICINE CLINIC | Facility: CLINIC | Age: 84
End: 2019-11-20

## 2019-11-20 VITALS
DIASTOLIC BLOOD PRESSURE: 68 MMHG | HEIGHT: 64 IN | OXYGEN SATURATION: 98 % | SYSTOLIC BLOOD PRESSURE: 112 MMHG | HEART RATE: 82 BPM | WEIGHT: 138 LBS | BODY MASS INDEX: 23.56 KG/M2 | TEMPERATURE: 98.2 F

## 2019-11-20 DIAGNOSIS — I48.0 PAROXYSMAL ATRIAL FIBRILLATION (HCC): ICD-10-CM

## 2019-11-20 DIAGNOSIS — M81.0 AGE-RELATED OSTEOPOROSIS WITHOUT CURRENT PATHOLOGICAL FRACTURE: ICD-10-CM

## 2019-11-20 DIAGNOSIS — E03.8 OTHER SPECIFIED HYPOTHYROIDISM: ICD-10-CM

## 2019-11-20 DIAGNOSIS — Z00.00 MEDICARE ANNUAL WELLNESS VISIT, SUBSEQUENT: Primary | ICD-10-CM

## 2019-11-20 LAB
25(OH)D3+25(OH)D2 SERPL-MCNC: 37.6 NG/ML (ref 30–100)
ALBUMIN SERPL-MCNC: 3.8 G/DL (ref 3.5–5.2)
ALBUMIN/GLOB SERPL: 1.4 G/DL
ALP SERPL-CCNC: 55 U/L (ref 39–117)
ALT SERPL-CCNC: 11 U/L (ref 1–33)
AST SERPL-CCNC: 18 U/L (ref 1–32)
BILIRUB SERPL-MCNC: 0.3 MG/DL (ref 0.2–1.2)
BUN SERPL-MCNC: 19 MG/DL (ref 8–23)
BUN/CREAT SERPL: 23.8 (ref 7–25)
CALCIUM SERPL-MCNC: 8.7 MG/DL (ref 8.6–10.5)
CHLORIDE SERPL-SCNC: 104 MMOL/L (ref 98–107)
CO2 SERPL-SCNC: 26.2 MMOL/L (ref 22–29)
CREAT SERPL-MCNC: 0.8 MG/DL (ref 0.57–1)
GLOBULIN SER CALC-MCNC: 2.8 GM/DL
GLUCOSE SERPL-MCNC: 83 MG/DL (ref 65–99)
POTASSIUM SERPL-SCNC: 4.7 MMOL/L (ref 3.5–5.2)
PROT SERPL-MCNC: 6.6 G/DL (ref 6–8.5)
SODIUM SERPL-SCNC: 139 MMOL/L (ref 136–145)
T4 FREE SERPL-MCNC: 1.62 NG/DL (ref 0.93–1.7)
TSH SERPL DL<=0.005 MIU/L-ACNC: 1.12 UIU/ML (ref 0.27–4.2)

## 2019-11-20 PROCEDURE — G0439 PPPS, SUBSEQ VISIT: HCPCS | Performed by: FAMILY MEDICINE

## 2019-11-20 NOTE — PROGRESS NOTES
The ABCs of the Annual Wellness Visit  Subsequent Medicare Wellness Visit    Chief Complaint   Patient presents with   • Annual Exam     AWV       Subjective   History of Present Illness:  Barbie Hernandez is a 87 y.o. female who presents for a Subsequent Medicare Wellness Visit.    HEALTH RISK ASSESSMENT    Recent Hospitalizations:  No hospitalization(s) within the last year.    Current Medical Providers:  Patient Care Team:  Tika Machado MD as PCP - General (Family Medicine)  Torito Seymour MD as PCP - Claims Attributed    Smoking Status:  Social History     Tobacco Use   Smoking Status Never Smoker   Smokeless Tobacco Never Used       Alcohol Consumption:  Social History     Substance and Sexual Activity   Alcohol Use No       Depression Screen:   PHQ-2/PHQ-9 Depression Screening 11/20/2019   Little interest or pleasure in doing things 0   Feeling down, depressed, or hopeless 0   Total Score 0       Fall Risk Screen:  STEADI Fall Risk Assessment was completed, and patient is at LOW risk for falls.Assessment completed on:11/20/2019    Health Habits and Functional and Cognitive Screening:  Functional & Cognitive Status 11/20/2019   Do you have difficulty preparing food and eating? No   Do you have difficulty bathing yourself, getting dressed or grooming yourself? No   Do you have difficulty using the toilet? No   Do you have difficulty moving around from place to place? No   Do you have trouble with steps or getting out of a bed or a chair? No   Current Diet Well Balanced Diet   Dental Exam Up to date   Eye Exam Up to date   Exercise (times per week) 1 times per week   Current Exercise Activities Include Swimming   Do you need help using the phone?  No   Are you deaf or do you have serious difficulty hearing?  Yes   Do you need help with transportation? No   Do you need help shopping? No   Do you need help preparing meals?  No   Do you need help with housework?  No   Do you need help with laundry? -   Do you  need help taking your medications? No   Do you need help managing money? No   Do you ever drive or ride in a car without wearing a seat belt? No   Have you felt unusual stress, anger or loneliness in the last month? No   Who do you live with? Alone   If you need help, do you have trouble finding someone available to you? No   Have you been bothered in the last four weeks by sexual problems? No   Do you have difficulty concentrating, remembering or making decisions? Yes         Does the patient have evidence of cognitive impairment? No    Asprin use counseling:Does not need ASA (and currently is not on it)    Age-appropriate Screening Schedule:  Refer to the list below for future screening recommendations based on patient's age, sex and/or medical conditions. Orders for these recommended tests are listed in the plan section. The patient has been provided with a written plan.    Health Maintenance   Topic Date Due   • TDAP/TD VACCINES (1 - Tdap) 09/28/1951   • DXA SCAN  10/18/2020   • INFLUENZA VACCINE  Completed   • PNEUMOCOCCAL VACCINES (65+ LOW/MEDIUM RISK)  Completed   • LIPID PANEL  Discontinued   • ZOSTER VACCINE  Discontinued          The following portions of the patient's history were reviewed and updated as appropriate: allergies, current medications, past family history, past medical history, past social history, past surgical history and problem list.    Outpatient Medications Prior to Visit   Medication Sig Dispense Refill   • CALCIUM PO Take  by mouth.     • carvedilol (COREG) 3.125 MG tablet Take 3.125 mg by mouth 2 (Two) Times a Day With Meals.     • levothyroxine (SYNTHROID, LEVOTHROID) 125 MCG tablet TAKE 1 TABLET EVERY DAY 90 tablet 0   • warfarin (COUMADIN) 5 MG tablet Take 5 mg by mouth See Admin Instructions. 7.5MG DAILY WITH 5 MWF       No facility-administered medications prior to visit.        Patient Active Problem List   Diagnosis   • Other specified hypothyroidism   • Paroxysmal atrial  "fibrillation (CMS/HCC)   • PPD positive   • Lower back pain   • Osteoporosis       Advanced Care Planning:  Patient has an advance directive - a copy has not been provided. Have asked the patient to send this to us to add to record    Review of Systems   HENT: Negative.    Eyes: Negative.    Respiratory: Negative.    Cardiovascular: Negative for chest pain, palpitations and leg swelling.   Gastrointestinal: Negative.    Endocrine: Negative.    Genitourinary: Negative.    Musculoskeletal: Positive for arthralgias and myalgias.   Allergic/Immunologic: Negative.    Neurological: Positive for weakness (legs).   Psychiatric/Behavioral: Positive for sleep disturbance.       Compared to one year ago, the patient feels her physical health is worse.  Compared to one year ago, the patient feels her mental health is the same.    Reviewed chart for potential of high risk medication in the elderly: yes  Reviewed chart for potential of harmful drug interactions in the elderly:yes    Objective         Vitals:    11/20/19 0953   BP: 112/68   Pulse: 82   Temp: 98.2 °F (36.8 °C)   TempSrc: Oral   SpO2: 98%   Weight: 62.6 kg (138 lb)   Height: 162.6 cm (64\")   PainSc: 0-No pain       Body mass index is 23.69 kg/m².  Discussed the patient's BMI with her. The BMI is above average; BMI management plan is completed.    Physical Exam   Constitutional: She is oriented to person, place, and time. She appears well-developed and well-nourished. No distress.   HENT:   Head: Normocephalic.   Nose: Nose normal.   Eyes: EOM are normal.   Cardiovascular: Normal rate, regular rhythm, normal heart sounds and intact distal pulses.   No murmur heard.  Pulmonary/Chest: Effort normal and breath sounds normal. No respiratory distress.   Musculoskeletal: Normal range of motion.   Neurological: She is alert and oriented to person, place, and time.   Skin: Skin is warm and dry. No rash noted.   Psychiatric: She has a normal mood and affect. Her behavior is " normal. Judgment and thought content normal.   Nursing note and vitals reviewed.            Assessment/Plan   Medicare Risks and Personalized Health Plan  CMS Preventative Services Quick Reference  Dementia/Memory   Immunizations Discussed/Encouraged (specific immunizations; Shingrix )  Inadequate Social Support, Isolation, Loneliness, Lack of Transportation, Financial Difficulties, or Caregiver Stress   Osteoprorosis Risk    The above risks/problems have been discussed with the patient.  Pertinent information has been shared with the patient in the After Visit Summary.  Follow up plans and orders are seen below in the Assessment/Plan Section.        Diagnoses and all orders for this visit:    1. Medicare annual wellness visit, subsequent (Primary)    2. Paroxysmal atrial fibrillation (CMS/[facility])  -     Comprehensive Metabolic Panel    3. Other specified hypothyroidism  -     TSH  -     T4, free    4. Age-related osteoporosis without current pathological fracture  -     Vitamin D 25 Hydroxy      Healthy 87-year-old female, mmunizations up-to-date, declined mammogram and colonoscopy, no longer indicated.  She does eat healthy diet, [facility]  She does need it for warm water.  Maintain strength, I do think she is at risk to losing more independence with weakness in the quads and hamstrings, she is willing to get a  at the [facility] for more assistance on how to maintain the strength she has.    Follow Up:  Return in about 6 months (around 5/20/2020), or if symptoms worsen or fail to improve, for Recheck AFib and Hypothyroidism .     An After Visit Summary and PPPS were given to the patient.

## 2019-11-27 NOTE — PROGRESS NOTES
Please call patient back with results.  The labs has resulted as normal, okay to repeat in 1 year.    Thank you

## 2020-01-13 ENCOUNTER — TELEPHONE (OUTPATIENT)
Dept: FAMILY MEDICINE CLINIC | Facility: CLINIC | Age: 85
End: 2020-01-13

## 2020-01-13 NOTE — TELEPHONE ENCOUNTER
If her symptoms have resolved, I do not think she needs to be seen for it.  However if they recur I would want her to make an appointment.  Thank you

## 2020-01-13 NOTE — TELEPHONE ENCOUNTER
Pt states over the weekend she developed lower left leg pain, she was unable to put weight on it for 2 days but then she woke up and it was fine and now she doesn't have pain. She wanted to see if Dr. Machado wants to see her for this or not

## 2020-01-14 NOTE — TELEPHONE ENCOUNTER
I think that is excellent, keep up the good work.  I would not stop cycling.  If there is any activity that brings a sharp pain I would stop it in the moment and get some help to make sure that her form is good otherwise no need to modify her activity.  Thank you

## 2020-01-14 NOTE — TELEPHONE ENCOUNTER
Pt states that the pain is completely gone. She started exercising twice a week at the [facility], using a cycling machine. Pt is curious if she should stop? Please advise.

## 2020-02-03 ENCOUNTER — OFFICE VISIT (OUTPATIENT)
Dept: FAMILY MEDICINE CLINIC | Facility: CLINIC | Age: 85
End: 2020-02-03

## 2020-02-03 VITALS
TEMPERATURE: 98.1 F | DIASTOLIC BLOOD PRESSURE: 78 MMHG | WEIGHT: 139 LBS | OXYGEN SATURATION: 95 % | BODY MASS INDEX: 23.73 KG/M2 | SYSTOLIC BLOOD PRESSURE: 110 MMHG | HEART RATE: 97 BPM | HEIGHT: 64 IN

## 2020-02-03 DIAGNOSIS — J06.9 ACUTE URI: Primary | ICD-10-CM

## 2020-02-03 PROCEDURE — 99213 OFFICE O/P EST LOW 20 MIN: CPT | Performed by: FAMILY MEDICINE

## 2020-02-03 NOTE — PROGRESS NOTES
Barbie Hernandez is a 87 y.o. female.     Chief Complaint   Patient presents with   • Sinus Problem     sinus pressure and sinnus pain  and drainage and sore throat pt feels some chills        HPI     Pt is a pleasant 87 y.o. YO female here for sinus pressure, sneezing, congestion, sore throat, chills, overall malaise feeling for 1 day.      The following portions of the patient's history were reviewed and updated as appropriate: allergies, current medications, past family history, past medical history, past social history, past surgical history and problem list.    Review of Systems   Constitutional: Positive for chills.   HENT: Positive for congestion, postnasal drip, sinus pressure, sinus pain and sore throat.    Respiratory: Negative for cough.    Cardiovascular: Negative.    Gastrointestinal: Negative.    Endocrine: Negative.    Genitourinary: Negative.    Musculoskeletal: Negative.    Skin: Negative.    Neurological: Negative.    Hematological: Negative.    Psychiatric/Behavioral: Negative.        Objective  Vitals:    02/03/20 0929   BP: 110/78   Pulse: 97   Temp: 98.1 °F (36.7 °C)   SpO2: 95%        Physical Exam   Constitutional: She is oriented to person, place, and time. She appears well-developed and well-nourished. No distress.   HENT:   Head: Normocephalic.   Right Ear: External ear normal.   Left Ear: External ear normal.   Mouth/Throat: Oropharynx is clear and moist. No oropharyngeal exudate.   No  sinus tenderness, erythema in the posterior oropharynx.   Eyes: Pupils are equal, round, and reactive to light. Conjunctivae are normal.   Cardiovascular: Normal rate, regular rhythm, normal heart sounds and intact distal pulses.   No murmur heard.  Pulmonary/Chest: Effort normal and breath sounds normal. No respiratory distress.   Abdominal: Soft. Bowel sounds are normal. She exhibits no distension.   Neurological: She is alert and oriented to person, place, and time.   Skin: Skin is warm and dry.    Psychiatric: She has a normal mood and affect. Her behavior is normal. Judgment and thought content normal.   Nursing note and vitals reviewed.        Current Outpatient Medications:   •  CALCIUM PO, Take  by mouth., Disp: , Rfl:   •  carvedilol (COREG) 3.125 MG tablet, Take 3.125 mg by mouth 2 (Two) Times a Day With Meals., Disp: , Rfl:   •  levothyroxine (SYNTHROID, LEVOTHROID) 125 MCG tablet, TAKE 1 TABLET EVERY DAY, Disp: 90 tablet, Rfl: 0    Procedures    Lab Results (most recent)     None              Barbie was seen today for sinus problem.    Diagnoses and all orders for this visit:    Acute URI    URI, supportive care, normal exam now feeling better.  Reassured pt. if symptoms recur or worsens okay to return for further evaluation.      Return if symptoms worsen or fail to improve.      Tika Machado MD

## 2020-02-07 RX ORDER — LEVOTHYROXINE SODIUM 0.12 MG/1
TABLET ORAL
Qty: 90 TABLET | Refills: 0 | Status: SHIPPED | OUTPATIENT
Start: 2020-02-07 | End: 2020-05-08 | Stop reason: SDUPTHER

## 2020-02-19 ENCOUNTER — APPOINTMENT (OUTPATIENT)
Dept: WOMENS IMAGING | Facility: HOSPITAL | Age: 85
End: 2020-02-19

## 2020-02-19 PROCEDURE — 77063 BREAST TOMOSYNTHESIS BI: CPT | Performed by: RADIOLOGY

## 2020-02-19 PROCEDURE — 77067 SCR MAMMO BI INCL CAD: CPT | Performed by: RADIOLOGY

## 2020-03-12 ENCOUNTER — INFUSION (OUTPATIENT)
Dept: ONCOLOGY | Facility: HOSPITAL | Age: 85
End: 2020-03-12

## 2020-03-12 VITALS
DIASTOLIC BLOOD PRESSURE: 79 MMHG | HEART RATE: 84 BPM | WEIGHT: 136 LBS | TEMPERATURE: 98.1 F | BODY MASS INDEX: 23.34 KG/M2 | OXYGEN SATURATION: 96 % | SYSTOLIC BLOOD PRESSURE: 133 MMHG

## 2020-03-12 DIAGNOSIS — M81.0 AGE-RELATED OSTEOPOROSIS WITHOUT CURRENT PATHOLOGICAL FRACTURE: Primary | ICD-10-CM

## 2020-03-12 PROCEDURE — 96372 THER/PROPH/DIAG INJ SC/IM: CPT

## 2020-03-12 PROCEDURE — 25010000002 DENOSUMAB 60 MG/ML SOLUTION PREFILLED SYRINGE: Performed by: OBSTETRICS & GYNECOLOGY

## 2020-03-12 RX ADMIN — DENOSUMAB 60 MG: 60 INJECTION SUBCUTANEOUS at 14:52

## 2020-03-12 NOTE — PROGRESS NOTES
Arrived  for prolia injection per ambulatory.  Indication and side effects reviewed. Denies recent dental work. Labs and medications verified. Prolia administered in  rt arm without incidence. Instructed to call prescribing MD for any concerns or questions and instructed on how to schedule future appts.  Pt vu and discharged ambulatory.

## 2020-05-08 RX ORDER — LEVOTHYROXINE SODIUM 0.12 MG/1
125 TABLET ORAL DAILY
Qty: 90 TABLET | Refills: 0 | Status: SHIPPED | OUTPATIENT
Start: 2020-05-08 | End: 2020-08-07 | Stop reason: SDUPTHER

## 2020-05-08 NOTE — TELEPHONE ENCOUNTER
Patient calling to refill:  - levothyroxine (SYNTHROID, LEVOTHROID) 125 MCG tablet    Verified [facility].    Patient can be reached at [phone].

## 2020-05-20 ENCOUNTER — OFFICE VISIT (OUTPATIENT)
Dept: FAMILY MEDICINE CLINIC | Facility: CLINIC | Age: 85
End: 2020-05-20
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
## 2020-05-20 DIAGNOSIS — M54.50 CHRONIC BILATERAL LOW BACK PAIN WITHOUT SCIATICA: ICD-10-CM

## 2020-05-20 DIAGNOSIS — G89.29 CHRONIC BILATERAL LOW BACK PAIN WITHOUT SCIATICA: ICD-10-CM
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
## 2020-05-20 DIAGNOSIS — M81.0 AGE-RELATED OSTEOPOROSIS WITHOUT CURRENT PATHOLOGICAL FRACTURE: Primary | ICD-10-CM

## 2020-05-20 PROBLEM — H35.3114 ADVANCED ATROPHIC NONEXUDATIVE AGE-RELATED MACULAR DEGENERATION OF RIGHT EYE WITH SUBFOVEAL INVOLVEMENT: Status: ACTIVE | Noted: 2020-05-20

## 2020-05-20 PROCEDURE — 99442 PR PHYS/QHP TELEPHONE EVALUATION 11-20 MIN: CPT | Performed by: FAMILY MEDICINE

## 2020-05-20 NOTE — PROGRESS NOTES
You have chosen to receive care through a telephone visit today. Do you consent to use a telephone visit for your medical care today? Yes     Pt is a pleasant 87 y.o. YO female here for televisit for R eye macular degeneration on Monday, started with injection.  Had severe pain after falling at home that lasted 2 weeks in March and then improved.  She has noticed increased weakness in her lower extremities, her back pain has resolved since then and she feels back to baseline.  But when looking into side effects of Prolia as she realized there were possibly effects of worsening strength, she talked her orthopedist about this and he agreed.  She is considering stopping Prolia.  I think 87 years old she probably is not getting as much benefit and is okay to stop if experiencing adverse effects of weakness which have increased her frequencies of falling at home.  Since March she has not fallen anymore, using walker or cane to assist with her gait.  Otherwise she feels like she is doing well.  Plan to continue same medications, discuss Prolia with Dr. Pascual and follow-up in the fall for Medicare wellness visit.  We can consider home PT, will wait until her risk for exposure is improved with pandemic.  For now she will try to get up and move as much she is able.    Medication list reviewed    Barbie was seen today for extremity weakness.    Diagnoses and all orders for this visit:    Age-related osteoporosis without current pathological fracture    Chronic bilateral low back pain without sciatica    Fall in home, subsequent encounter         Instructed patient to call the office if symptoms are not improving.  Warnings to go to emergency room given.    This visit has been rescheduled as a phone visit to comply with patient safety concerns in accordance with CDC recommendations. Total time of discussion was 11 minutes.    Return in about 6 months (around 11/20/2020), or if symptoms worsen or fail to improve, for Medicare  Wellness and labs.    Tika Machado MD

## 2020-07-31 ENCOUNTER — OFFICE VISIT (OUTPATIENT)
Dept: ORTHOPEDIC SURGERY | Facility: CLINIC | Age: 85
End: 2020-07-31

## 2020-07-31 VITALS — HEIGHT: 64 IN | BODY MASS INDEX: 23.39 KG/M2 | TEMPERATURE: 97.4 F | WEIGHT: 137 LBS

## 2020-07-31 DIAGNOSIS — M54.50 LOW BACK PAIN, UNSPECIFIED BACK PAIN LATERALITY, UNSPECIFIED CHRONICITY, UNSPECIFIED WHETHER SCIATICA PRESENT: Primary | ICD-10-CM

## 2020-07-31 PROCEDURE — 72100 X-RAY EXAM L-S SPINE 2/3 VWS: CPT | Performed by: ORTHOPAEDIC SURGERY

## 2020-07-31 PROCEDURE — 99213 OFFICE O/P EST LOW 20 MIN: CPT | Performed by: ORTHOPAEDIC SURGERY

## 2020-07-31 NOTE — PROGRESS NOTES
New patient or new problem visit    Chief Complaint   Patient presents with   • Lumbar Spine - Follow-up, Pain       HPI: She is seen today for complaints of back pain ongoing 4 to 6weeks accounts very is severe intermittent stabbing but generally improving from before but yesterday was very bad pain is in the right lumbar region Tylenol heat and cold helps somewhat.  She was weeding the garden when all this started.  Saw her a couple years ago for spinal stenosis and she did epidural injections at that time.  No leg pain this time around.    PFSH: See chart- reviewed    Review of Systems   Constitutional: Positive for activity change.   HENT: Positive for hearing loss.    Gastrointestinal: Positive for constipation.   Genitourinary: Positive for frequency and urgency.   Musculoskeletal: Positive for arthralgias and back pain.       PE: Constitutional: Vital signs above-noted.  Awake, alert and oriented    Psychiatric: Affect and insight do not appear grossly disturbed.    Pulmonary: Breathing is unlabored, color is good.    Skin: Warm, dry and normal turgor    Cardiac: Pedal pulses intact.  No edema.    Eyesight and hearing appear adequate for examination purposes      Musculoskeletal:  There is some tenderness to percussion and palpation of the right lumbosacral spine. Motion appears undisturbed.  Posture is scoliotic to coronal and sagittal inspection.    The skin about the area is intact.  There is no palpable or visible deformity.  There is no local spasm.       Neurologic:   Reflexes are 2+ and symmetrical in the patellae and absent in the Achilles.   Motor function is undisturbed in quadriceps, EHL, and gastrocnemius   sensation appears symmetrically intact to light touch.  In the bilateral lower extremities there is no evidence of atrophy.   Clonus is absent..  Gait appears stable.  SLR test negative      MEDICAL DECISION MAKING    XRAY: Plain film x-rays show scoliosis but well-maintained lordosis and no  change from 2018 x-rays.  I do not see any obvious fractures.    Other: n/a    Impression: Aggravated lumbar disc degeneration and scoliosis possible nondisplaced fracture but should be healing nicely at this point    Plan: I will see her in 4 to 6 weeks if she fails to improve she will try warm-and-form corset meantime and then epidural injections if the brace is not helping substantially.  She does return let us get x-rays on return visit.

## 2020-08-07 RX ORDER — LEVOTHYROXINE SODIUM 0.12 MG/1
125 TABLET ORAL DAILY
Qty: 90 TABLET | Refills: 0 | Status: SHIPPED | OUTPATIENT
Start: 2020-08-07 | End: 2020-11-04

## 2020-09-01 ENCOUNTER — ANESTHESIA EVENT (OUTPATIENT)
Dept: PAIN MEDICINE | Facility: HOSPITAL | Age: 85
End: 2020-09-01

## 2020-09-01 ENCOUNTER — ANESTHESIA (OUTPATIENT)
Dept: PAIN MEDICINE | Facility: HOSPITAL | Age: 85
End: 2020-09-01

## 2020-09-01 ENCOUNTER — HOSPITAL ENCOUNTER (OUTPATIENT)
Dept: GENERAL RADIOLOGY | Facility: HOSPITAL | Age: 85
Discharge: HOME | End: 2020-09-01

## 2020-09-01 ENCOUNTER — HOSPITAL ENCOUNTER (OUTPATIENT)
Dept: PAIN MEDICINE | Facility: HOSPITAL | Age: 85
Discharge: HOME | End: 2020-09-01
Admitting: ANESTHESIOLOGY

## 2020-09-01 VITALS
HEIGHT: 64 IN | TEMPERATURE: 97.8 F | OXYGEN SATURATION: 97 % | SYSTOLIC BLOOD PRESSURE: 129 MMHG | BODY MASS INDEX: 23.39 KG/M2 | HEART RATE: 64 BPM | WEIGHT: 137 LBS | RESPIRATION RATE: 16 BRPM | DIASTOLIC BLOOD PRESSURE: 88 MMHG

## 2020-09-01 DIAGNOSIS — M54.50 ACUTE RIGHT-SIDED LOW BACK PAIN WITHOUT SCIATICA: Primary | ICD-10-CM

## 2020-09-01 DIAGNOSIS — R52 PAIN: ICD-10-CM

## 2020-09-01 PROCEDURE — 77003 FLUOROGUIDE FOR SPINE INJECT: CPT

## 2020-09-01 PROCEDURE — C1755 CATHETER, INTRASPINAL: HCPCS

## 2020-09-01 PROCEDURE — 25010000002 METHYLPREDNISOLONE PER 80 MG: Performed by: ANESTHESIOLOGY

## 2020-09-01 PROCEDURE — 0 IOPAMIDOL 41 % SOLUTION: Performed by: ANESTHESIOLOGY

## 2020-09-01 RX ORDER — METHYLPREDNISOLONE ACETATE 80 MG/ML
80 INJECTION, SUSPENSION INTRA-ARTICULAR; INTRALESIONAL; INTRAMUSCULAR; SOFT TISSUE ONCE
Status: COMPLETED | OUTPATIENT
Start: 2020-09-01 | End: 2020-09-01

## 2020-09-01 RX ORDER — LIDOCAINE HYDROCHLORIDE 10 MG/ML
1 INJECTION, SOLUTION INFILTRATION; PERINEURAL ONCE AS NEEDED
Status: DISCONTINUED | OUTPATIENT
Start: 2020-09-01 | End: 2020-09-02 | Stop reason: HOSPADM

## 2020-09-01 RX ORDER — FENTANYL CITRATE 50 UG/ML
50 INJECTION, SOLUTION INTRAMUSCULAR; INTRAVENOUS AS NEEDED
Status: DISCONTINUED | OUTPATIENT
Start: 2020-09-01 | End: 2020-09-02 | Stop reason: HOSPADM

## 2020-09-01 RX ORDER — SODIUM CHLORIDE 0.9 % (FLUSH) 0.9 %
1-10 SYRINGE (ML) INJECTION AS NEEDED
Status: DISCONTINUED | OUTPATIENT
Start: 2020-09-01 | End: 2020-09-02 | Stop reason: HOSPADM

## 2020-09-01 RX ADMIN — METHYLPREDNISOLONE ACETATE 80 MG: 80 INJECTION, SUSPENSION INTRA-ARTICULAR; INTRALESIONAL; INTRAMUSCULAR; SOFT TISSUE at 09:28

## 2020-09-01 RX ADMIN — IOPAMIDOL 2 ML: 408 INJECTION, SOLUTION INTRATHECAL at 09:28

## 2020-09-01 NOTE — H&P
[facility]    History and Physical    Patient Name: Barbie Hernandez  :  1932  MRN:  [id number]  Date of Admission: 2020    Subjective     Patient is a 87 y.o. female presents with chief complaint of acute low back and hips: right pain.  She had two epidurals in 2018 and did very well until recently when she was bending over weeding her garden when the pain returned.  Dr Braga did x-rays which didn't show any new changes and suggested epidurals.    The following portions of the patients history were reviewed and updated as appropriate: current medications, allergies, past medical history, past surgical history, past family history, past social history and problem list                Objective     Past Medical History:   Past Medical History:   Diagnosis Date   • A-fib (CMS/HCC)    • Arthritis    • Atrial fibrillation (CMS/HCC)    • Cancer (CMS/HCC)    • Coronary artery disease    • Disease of thyroid gland    • Hyperlipidemia    • Macular degeneration      Past Surgical History:   Past Surgical History:   Procedure Laterality Date   • CATARACT EXTRACTION Bilateral    • COLON RESECTION      INTESTINAL OBBSTRUCTION   • PACEMAKER IMPLANTATION     • PYLOROMYOTOMY     • UMBILICAL HERNIA REPAIR       Family History:   Family History   Problem Relation Age of Onset   • Breast cancer Mother    • OCD Mother         neurotic and recluse    • Lung cancer Father    • Diabetes type I Son    • Breast cancer Maternal Grandmother    • Breast cancer Other    • Ovarian cancer Sister    • Stroke Neg Hx    • Heart attack Neg Hx      Social History:   Social History     Tobacco Use   • Smoking status: Never Smoker   • Smokeless tobacco: Never Used   Substance Use Topics   • Alcohol use: No   • Drug use: No       Vital Signs Range for the last 24 hours  Temperature: Temp:  [36.6 °C (97.8 °F)] 36.6 °C (97.8 °F)   Temp Source: Temp src: Oral   BP: BP: (154)/(82) 154/82   Pulse: Heart Rate:  [94] 94   Respirations: Resp:   "[16] 16   SPO2: SpO2:  [95 %] 95 %   O2 Amount (l/min):     O2 Devices Device (Oxygen Therapy): room air   Weight: Weight:  [62.1 kg (137 lb)] 62.1 kg (137 lb)     Flowsheet Rows      First Filed Value   Admission Height  162.6 cm (64\") Documented at 09/01/2020 0902   Admission Weight  62.1 kg (137 lb) Documented at 09/01/2020 0902          --------------------------------------------------------------------------------    Current Outpatient Medications   Medication Sig Dispense Refill   • CALCIUM PO Take  by mouth.     • carvedilol (COREG) 3.125 MG tablet Take 3.125 mg by mouth 2 (Two) Times a Day With Meals.     • denosumab (Prolia) 60 MG/ML solution prefilled syringe syringe Inject 60 mg under the skin into the appropriate area as directed 1 (One) Time.     • levothyroxine (SYNTHROID, LEVOTHROID) 125 MCG tablet Take 1 tablet by mouth Daily. 90 tablet 0     Current Facility-Administered Medications   Medication Dose Route Frequency Provider Last Rate Last Dose   • fentaNYL citrate (PF) (SUBLIMAZE) injection 50 mcg  50 mcg Intravenous PRN Jersey Montejo MD       • iopamidol (ISOVUE-M 200) injection 41%  12 mL Epidural Once in imaging Jersey Montejo MD       • lidocaine (XYLOCAINE) 1 % injection 1 mL  1 mL Intradermal Once PRN Jersey Montejo MD       • methylPREDNISolone acetate (DEPO-medrol) injection 80 mg  80 mg Intra-articular Once Jersey Montejo MD       • sodium chloride 0.9 % flush 1-10 mL  1-10 mL Intravenous PRN Jersey Montejo MD           --------------------------------------------------------------------------------  Assessment/Plan      Anesthesia Evaluation                  Airway   Mallampati: II  Dental - normal exam     Pulmonary - normal exam   Cardiovascular - normal exam    (+) dysrhythmias Atrial Fib,       Neuro/Psych  GI/Hepatic/Renal/Endo      Musculoskeletal     Abdominal    Substance History      OB/GYN          Other                   Diagnosis and Plan    Treatment Plan  ASA 3    "   Procedures: Lumbar Epidural Steroid Injection(LESI), With fluoroscopy,       Anesthetic plan and risks discussed with patient.          Diagnosis     * Lumbar degenerative disc disease [M51.36]     * Lumbago [M54.5]

## 2020-09-01 NOTE — ANESTHESIA PROCEDURE NOTES
PAIN Epidural block      Patient reassessed immediately prior to procedure    Patient location during procedure: pain clinic  Indication:procedure for pain  Performed By  Anesthesiologist: Jersey Montejo MD  Preanesthetic Checklist  Completed: patient identified, site marked, surgical consent, pre-op evaluation, timeout performed, IV checked, risks and benefits discussed and monitors and equipment checked  Additional Notes  Performed under fluoroscopy  Post-Op Diagnosis Codes:     * Lumbar degenerative disc disease (M51.36)     * Lumbago (M54.5)  Prep:  Pt Position:prone  Sterile Tech:cap, gloves, mask and sterile barrier  Prep:chlorhexidine gluconate and isopropyl alcohol  Monitoring:blood pressure monitoring, continuous pulse oximetry and EKG  Procedure:Sedation: no     Approach:right paramedian  Guidance: fluoroscopy  Location:lumbar (Intralaminar)  Level:L5-S1  Needle Type:Tuohy  Needle Gauge:20 G  Aspiration:negative  Medications:  Depomedrol:80 mg  Preservative Free Saline:4mL  Isovue:3mL  Comments:Needle placement confirmed with epidural spread of contrast injection.  Post Assessment:  Post-procedure: Bandaid.  Pt Tolerance:patient tolerated the procedure well with no apparent complications  Complications:no

## 2020-09-15 ENCOUNTER — INFUSION (OUTPATIENT)
Dept: ONCOLOGY | Facility: HOSPITAL | Age: 85
End: 2020-09-15

## 2020-09-15 VITALS
TEMPERATURE: 97.3 F | BODY MASS INDEX: 22.82 KG/M2 | HEART RATE: 86 BPM | SYSTOLIC BLOOD PRESSURE: 96 MMHG | OXYGEN SATURATION: 96 % | DIASTOLIC BLOOD PRESSURE: 56 MMHG | HEIGHT: 65 IN | WEIGHT: 137 LBS | RESPIRATION RATE: 18 BRPM

## 2020-09-15 DIAGNOSIS — M81.0 AGE-RELATED OSTEOPOROSIS WITHOUT CURRENT PATHOLOGICAL FRACTURE: Primary | ICD-10-CM

## 2020-09-15 PROCEDURE — 25010000002 DENOSUMAB 60 MG/ML SOLUTION PREFILLED SYRINGE: Performed by: OBSTETRICS & GYNECOLOGY

## 2020-09-15 PROCEDURE — 96372 THER/PROPH/DIAG INJ SC/IM: CPT

## 2020-09-15 RX ADMIN — DENOSUMAB 60 MG: 60 INJECTION SUBCUTANEOUS at 14:59

## 2020-11-04 RX ORDER — LEVOTHYROXINE SODIUM 0.12 MG/1
TABLET ORAL
Qty: 90 TABLET | Refills: 0 | Status: SHIPPED | OUTPATIENT
Start: 2020-11-04 | End: 2021-02-03 | Stop reason: SDUPTHER

## 2020-11-24 ENCOUNTER — OFFICE VISIT (OUTPATIENT)
Dept: FAMILY MEDICINE CLINIC | Facility: CLINIC | Age: 85
End: 2020-11-24

## 2020-11-24 VITALS
HEART RATE: 79 BPM | BODY MASS INDEX: 23.83 KG/M2 | WEIGHT: 139.6 LBS | SYSTOLIC BLOOD PRESSURE: 118 MMHG | DIASTOLIC BLOOD PRESSURE: 72 MMHG | TEMPERATURE: 97.4 F | OXYGEN SATURATION: 98 % | HEIGHT: 64 IN

## 2020-11-24 DIAGNOSIS — M54.50 ACUTE RIGHT-SIDED LOW BACK PAIN WITHOUT SCIATICA: ICD-10-CM

## 2020-11-24 DIAGNOSIS — R29.898 WEAKNESS OF BOTH LOWER EXTREMITIES: ICD-10-CM

## 2020-11-24 DIAGNOSIS — M94.9 DISORDER OF CARTILAGE, UNSPECIFIED: ICD-10-CM

## 2020-11-24 DIAGNOSIS — I48.0 PAROXYSMAL ATRIAL FIBRILLATION (HCC): ICD-10-CM

## 2020-11-24 DIAGNOSIS — Z00.00 MEDICARE ANNUAL WELLNESS VISIT, SUBSEQUENT: Primary | ICD-10-CM

## 2020-11-24 DIAGNOSIS — E03.8 OTHER SPECIFIED HYPOTHYROIDISM: ICD-10-CM

## 2020-11-24 LAB
25(OH)D3+25(OH)D2 SERPL-MCNC: 36.9 NG/ML (ref 30–100)
ALBUMIN SERPL-MCNC: 3.9 G/DL (ref 3.5–5.2)
ALBUMIN/GLOB SERPL: 1.6 G/DL
ALP SERPL-CCNC: 52 U/L (ref 39–117)
ALT SERPL-CCNC: 13 U/L (ref 1–33)
AST SERPL-CCNC: 18 U/L (ref 1–32)
BASOPHILS # BLD AUTO: 0.03 10*3/MM3 (ref 0–0.2)
BASOPHILS NFR BLD AUTO: 0.6 % (ref 0–1.5)
BILIRUB SERPL-MCNC: 0.4 MG/DL (ref 0–1.2)
BUN SERPL-MCNC: 20 MG/DL (ref 8–23)
BUN/CREAT SERPL: 25.6 (ref 7–25)
CALCIUM SERPL-MCNC: 9 MG/DL (ref 8.6–10.5)
CHLORIDE SERPL-SCNC: 102 MMOL/L (ref 98–107)
CO2 SERPL-SCNC: 27.4 MMOL/L (ref 22–29)
CREAT SERPL-MCNC: 0.78 MG/DL (ref 0.57–1)
EOSINOPHIL # BLD AUTO: 0.1 10*3/MM3 (ref 0–0.4)
EOSINOPHIL NFR BLD AUTO: 2 % (ref 0.3–6.2)
ERYTHROCYTE [DISTWIDTH] IN BLOOD BY AUTOMATED COUNT: 12.9 % (ref 12.3–15.4)
GLOBULIN SER CALC-MCNC: 2.5 GM/DL
GLUCOSE SERPL-MCNC: 79 MG/DL (ref 65–99)
HCT VFR BLD AUTO: 40.5 % (ref 34–46.6)
HGB BLD-MCNC: 13.7 G/DL (ref 12–15.9)
IMM GRANULOCYTES # BLD AUTO: 0.01 10*3/MM3 (ref 0–0.05)
IMM GRANULOCYTES NFR BLD AUTO: 0.2 % (ref 0–0.5)
LYMPHOCYTES # BLD AUTO: 0.92 10*3/MM3 (ref 0.7–3.1)
LYMPHOCYTES NFR BLD AUTO: 18.5 % (ref 19.6–45.3)
MCH RBC QN AUTO: 29.8 PG (ref 26.6–33)
MCHC RBC AUTO-ENTMCNC: 33.8 G/DL (ref 31.5–35.7)
MCV RBC AUTO: 88.2 FL (ref 79–97)
MONOCYTES # BLD AUTO: 0.64 10*3/MM3 (ref 0.1–0.9)
MONOCYTES NFR BLD AUTO: 12.9 % (ref 5–12)
NEUTROPHILS # BLD AUTO: 3.28 10*3/MM3 (ref 1.7–7)
NEUTROPHILS NFR BLD AUTO: 65.8 % (ref 42.7–76)
NRBC BLD AUTO-RTO: 0 /100 WBC (ref 0–0.2)
PLATELET # BLD AUTO: 193 10*3/MM3 (ref 140–450)
POTASSIUM SERPL-SCNC: 4.6 MMOL/L (ref 3.5–5.2)
PROT SERPL-MCNC: 6.4 G/DL (ref 6–8.5)
RBC # BLD AUTO: 4.59 10*6/MM3 (ref 3.77–5.28)
SODIUM SERPL-SCNC: 137 MMOL/L (ref 136–145)
T4 FREE SERPL-MCNC: 1.69 NG/DL (ref 0.93–1.7)
TSH SERPL DL<=0.005 MIU/L-ACNC: 1.25 UIU/ML (ref 0.27–4.2)
WBC # BLD AUTO: 4.98 10*3/MM3 (ref 3.4–10.8)

## 2020-11-24 PROCEDURE — 99213 OFFICE O/P EST LOW 20 MIN: CPT | Performed by: FAMILY MEDICINE

## 2020-11-24 PROCEDURE — G0439 PPPS, SUBSEQ VISIT: HCPCS | Performed by: FAMILY MEDICINE

## 2020-11-24 NOTE — PROGRESS NOTES
The ABCs of the Annual Wellness Visit  Subsequent Medicare Wellness Visit    Chief Complaint   Patient presents with   • Medicare Wellness-subsequent     no complains        Subjective   History of Present Illness:  Barbie Hernandez is a 88 y.o. female who presents for a Subsequent Medicare Wellness Visit.    Pt with weakness in the bilateral legs, having issues staning.  She does have some exercise equipment, hard to alk.  Lower back pain that has been chronic for years worsening, and recently had epidural injection which improved her symptoms substantially.  However she has noticed significant bilateral weakness.  She is now having difficulty standing without using her arms.  It occasionally if she has a seat without handles it will be difficult to get out of the chair.  She does not have any residual symptoms of neuropathy.  Atrial fibrillation well controlled, has an appointment with her cardiologist tomorrow.  Hypothyroidism well-controlled, no symptoms.  Feeling otherwise well    HEALTH RISK ASSESSMENT    Recent Hospitalizations:  No hospitalization(s) within the last year.    Current Medical Providers:  Patient Care Team:  Tika Machado MD as PCP - General (Family Medicine)    Smoking Status:  Social History     Tobacco Use   Smoking Status Never Smoker   Smokeless Tobacco Never Used       Alcohol Consumption:  Social History     Substance and Sexual Activity   Alcohol Use No       Depression Screen:   PHQ-2/PHQ-9 Depression Screening 11/24/2020   Little interest or pleasure in doing things 0   Feeling down, depressed, or hopeless 0   Total Score 0       Fall Risk Screen:  STEADI Fall Risk Assessment was completed, and patient is at MODERATE risk for falls. Assessment completed on:11/24/2020    Health Habits and Functional and Cognitive Screening:  Functional & Cognitive Status 11/24/2020   Do you have difficulty preparing food and eating? No   Do you have difficulty bathing yourself, getting dressed or  grooming yourself? No   Do you have difficulty using the toilet? No   Do you have difficulty moving around from place to place? No   Do you have trouble with steps or getting out of a bed or a chair? Yes   Current Diet Well Balanced Diet   Dental Exam Up to date   Eye Exam Up to date   Exercise (times per week) 2 times per week   Current Exercise Activities Include Walking   Do you need help using the phone?  No   Are you deaf or do you have serious difficulty hearing?  Yes   Do you need help with transportation? No   Do you need help shopping? No   Do you need help preparing meals?  No   Do you need help with housework?  No   Do you need help with laundry? No   Do you need help taking your medications? No   Do you need help managing money? No   Do you ever drive or ride in a car without wearing a seat belt? No   Have you felt unusual stress, anger or loneliness in the last month? No   Who do you live with? Alone   If you need help, do you have trouble finding someone available to you? No   Have you been bothered in the last four weeks by sexual problems? No   Do you have difficulty concentrating, remembering or making decisions? No         Does the patient have evidence of cognitive impairment? No    Asprin use counseling:Does not need ASA (and currently is not on it)    Age-appropriate Screening Schedule:  Refer to the list below for future screening recommendations based on patient's age, sex and/or medical conditions. Orders for these recommended tests are listed in the plan section. The patient has been provided with a written plan.    Health Maintenance   Topic Date Due   • TDAP/TD VACCINES (1 - Tdap) 09/28/1951   • DXA SCAN  10/18/2020   • INFLUENZA VACCINE  Completed   • LIPID PANEL  Discontinued   • ZOSTER VACCINE  Discontinued          The following portions of the patient's history were reviewed and updated as appropriate: allergies, current medications, past family history, past medical history, past  social history, past surgical history and problem list.    Outpatient Medications Prior to Visit   Medication Sig Dispense Refill   • CALCIUM PO Take  by mouth.     • carvedilol (COREG) 3.125 MG tablet Take 3.125 mg by mouth 2 (Two) Times a Day With Meals.     • denosumab (Prolia) 60 MG/ML solution prefilled syringe syringe Inject 60 mg under the skin into the appropriate area as directed 1 (One) Time.     • levothyroxine (SYNTHROID, LEVOTHROID) 125 MCG tablet TAKE 1 TABLET EVERY DAY 90 tablet 0     No facility-administered medications prior to visit.        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 Patient has an advance directive (not in EMR), copy requested.    Review of Systems   Constitutional: Negative for chills, fatigue, fever and unexpected weight change.   HENT: Negative for ear pain, hearing loss, sinus pressure, sore throat and tinnitus.    Eyes: Positive for visual disturbance. Negative for pain, discharge and redness.   Respiratory: Negative for cough, shortness of breath and wheezing.    Cardiovascular: Negative for chest pain, palpitations and leg swelling.   Gastrointestinal: Negative for abdominal pain, constipation, diarrhea and nausea.   Endocrine: Negative for cold intolerance and heat intolerance.   Genitourinary: Negative for difficulty urinating, flank pain and urgency.   Musculoskeletal: Positive for arthralgias and myalgias. Negative for back pain and joint swelling.   Skin: Negative for rash and wound.   Allergic/Immunologic: Negative for environmental allergies and food allergies.   Neurological: Positive for weakness. Negative for dizziness, seizures, numbness  "and headaches.   Hematological: Negative for adenopathy. Does not bruise/bleed easily.   Psychiatric/Behavioral: Negative for decreased concentration, dysphoric mood and sleep disturbance. The patient is not nervous/anxious.    All other systems reviewed and are negative.      Compared to one year ago, the patient feels her physical health is worse.  Compared to one year ago, the patient feels her mental health is the same.    Reviewed chart for potential of high risk medication in the elderly: yes  Reviewed chart for potential of harmful drug interactions in the elderly:yes    Objective         Vitals:    11/24/20 0943   BP: 118/72   Pulse: 79   Temp: 97.4 °F (36.3 °C)   SpO2: 98%   Weight: 63.3 kg (139 lb 9.6 oz)   Height: 162.6 cm (64\")       Body mass index is 23.96 kg/m².  Discussed the patient's BMI with her. The BMI is in the acceptable range.    Physical Exam  Vitals signs and nursing note reviewed.   Constitutional:       General: She is not in acute distress.     Appearance: She is well-developed.   HENT:      Head: Normocephalic.      Nose: Nose normal.   Cardiovascular:      Rate and Rhythm: Normal rate and regular rhythm.      Heart sounds: Normal heart sounds. No murmur.   Pulmonary:      Effort: Pulmonary effort is normal. No respiratory distress.      Breath sounds: Normal breath sounds.   Musculoskeletal: Normal range of motion.   Skin:     General: Skin is warm and dry.      Findings: No rash.   Neurological:      Mental Status: She is alert and oriented to person, place, and time.   Psychiatric:         Behavior: Behavior normal.         Thought Content: Thought content normal.         Judgment: Judgment normal.               Assessment/Plan   Medicare Risks and Personalized Health Plan  CMS Preventative Services Quick Reference  Abdominal Aortic Aneurysm Screening  Cardiovascular risk  Chronic Pain   Depression/Dysphoria  Inactivity/Sedentary  Obesity/Overweight     The above risks/problems have " been discussed with the patient.  Pertinent information has been shared with the patient in the After Visit Summary.  Follow up plans and orders are seen below in the Assessment/Plan Section.    Diagnoses and all orders for this visit:    1. Medicare annual wellness visit, subsequent (Primary)  -     Comprehensive Metabolic Panel  -     Vitamin D 25 Hydroxy  -     CBC Auto Differential  -     TSH  -     T4, Free    2. Paroxysmal atrial fibrillation (CMS/HCC)  -     Comprehensive Metabolic Panel  -     Vitamin D 25 Hydroxy  -     CBC Auto Differential  -     TSH  -     T4, Free  -     Ambulatory Referral to Physical Therapy Evaluate and treat    3. Other specified hypothyroidism  -     Comprehensive Metabolic Panel  -     Vitamin D 25 Hydroxy  -     CBC Auto Differential  -     TSH  -     T4, Free    4. Disorder of cartilage, unspecified   -     Vitamin D 25 Hydroxy    5. Weakness of both lower extremities  -     Ambulatory Referral to Physical Therapy Evaluate and treat    6. Acute right-sided low back pain without sciatica  -     Ambulatory Referral to Physical Therapy Evaluate and treat    Other orders  -     Cancel: Lipid Panel    Pleasant 88-year-old female here for Medicare wellness visit.  Fasting labs as above, patient declined cholesterol.  Discussed maintaining good protein intake and maintaining strength.  She does have significant atrophy of the quadriceps and hamstring likely due to sitting for long periods of time after her lower back pain is worsening.  She is now having difficulty standing without assistance.  She is living by herself but with good support around her.  We discussed her safety, plan to start physical therapy, maintain good protein intake and will follow up in 3 months.  If not improving or having other issues she will let me know.    Records for kelin and dexa requested from GYN.     Follow Up:  Return in 3 months (on 2/24/2021), or if symptoms worsen or fail to improve, for Recheck  weakness.     An After Visit Summary and PPPS were given to the patient.     Tika Machado MD    Mask and eyewear protection worn by myself and medical assistant during entire encounter.  Patient wore mask.

## 2021-02-03 RX ORDER — LEVOTHYROXINE SODIUM 0.12 MG/1
125 TABLET ORAL DAILY
Qty: 90 TABLET | Refills: 1 | Status: SHIPPED | OUTPATIENT
Start: 2021-02-03 | End: 2021-07-27

## 2021-03-17 ENCOUNTER — INFUSION (OUTPATIENT)
Dept: ONCOLOGY | Facility: HOSPITAL | Age: 86
End: 2021-03-17

## 2021-03-17 ENCOUNTER — LAB (OUTPATIENT)
Dept: OTHER | Facility: HOSPITAL | Age: 86
End: 2021-03-17

## 2021-03-17 VITALS — BODY MASS INDEX: 23.32 KG/M2 | TEMPERATURE: 97.3 F | WEIGHT: 136.6 LBS | HEIGHT: 64 IN | RESPIRATION RATE: 18 BRPM

## 2021-03-17 DIAGNOSIS — M81.0 AGE-RELATED OSTEOPOROSIS WITHOUT CURRENT PATHOLOGICAL FRACTURE: Primary | ICD-10-CM

## 2021-03-17 PROCEDURE — 96372 THER/PROPH/DIAG INJ SC/IM: CPT

## 2021-03-17 PROCEDURE — 25010000002 DENOSUMAB 60 MG/ML SOLUTION PREFILLED SYRINGE: Performed by: OBSTETRICS & GYNECOLOGY

## 2021-03-17 PROCEDURE — 83735 ASSAY OF MAGNESIUM: CPT | Performed by: OBSTETRICS & GYNECOLOGY

## 2021-03-17 PROCEDURE — 80053 COMPREHEN METABOLIC PANEL: CPT | Performed by: OBSTETRICS & GYNECOLOGY

## 2021-03-17 PROCEDURE — 84100 ASSAY OF PHOSPHORUS: CPT | Performed by: OBSTETRICS & GYNECOLOGY

## 2021-03-17 RX ADMIN — DENOSUMAB 60 MG: 60 INJECTION SUBCUTANEOUS at 14:03

## 2021-03-17 NOTE — NURSING NOTE
Arrived ambulatory for prolia injection. Indication and side effects reviewed. Denies recent dental work. Labs and medications verified, pt states that she is taking calcium 500 mg BID along with vit D. Prolia administered in left arm without incidence. Instructed to call prescribing MD for any concerns or questions and instructed on how to schedule future appts.  Pt vu and discharged ambulatory.    Lab Results   Component Value Date    GLUCOSE 99 03/17/2021    BUN 22 03/17/2021    CREATININE 0.94 03/17/2021    EGFRIFNONA 56 (L) 03/17/2021    EGFRIFAFRI 84 11/24/2020    BCR 23.4 03/17/2021    K 4.4 03/17/2021    CO2 27.9 03/17/2021    CALCIUM 9.5 03/17/2021    PROTENTOTREF 6.4 11/24/2020    ALBUMIN 3.70 03/17/2021    LABIL2 1.6 11/24/2020    AST 19 03/17/2021    ALT 12 03/17/2021

## 2021-04-14 ENCOUNTER — ANESTHESIA (OUTPATIENT)
Dept: PAIN MEDICINE | Facility: HOSPITAL | Age: 86
End: 2021-04-14

## 2021-04-14 ENCOUNTER — HOSPITAL ENCOUNTER (OUTPATIENT)
Dept: GENERAL RADIOLOGY | Facility: HOSPITAL | Age: 86
Discharge: HOME | End: 2021-04-14

## 2021-04-14 ENCOUNTER — HOSPITAL ENCOUNTER (OUTPATIENT)
Dept: PAIN MEDICINE | Facility: HOSPITAL | Age: 86
Discharge: HOME | End: 2021-04-14

## 2021-04-14 ENCOUNTER — ANESTHESIA EVENT (OUTPATIENT)
Dept: PAIN MEDICINE | Facility: HOSPITAL | Age: 86
End: 2021-04-14

## 2021-04-14 VITALS
SYSTOLIC BLOOD PRESSURE: 140 MMHG | RESPIRATION RATE: 16 BRPM | TEMPERATURE: 97.5 F | OXYGEN SATURATION: 96 % | DIASTOLIC BLOOD PRESSURE: 72 MMHG | HEART RATE: 73 BPM

## 2021-04-14 DIAGNOSIS — M54.50 LUMBAR PAIN: ICD-10-CM

## 2021-04-14 DIAGNOSIS — M54.16 LUMBAR NEURITIS: Primary | ICD-10-CM

## 2021-04-14 PROCEDURE — 77003 FLUOROGUIDE FOR SPINE INJECT: CPT

## 2021-04-14 PROCEDURE — 25010000002 METHYLPREDNISOLONE PER 80 MG: Performed by: ANESTHESIOLOGY

## 2021-04-14 PROCEDURE — 0 IOPAMIDOL 41 % SOLUTION: Performed by: ANESTHESIOLOGY

## 2021-04-14 RX ORDER — SODIUM CHLORIDE 0.9 % (FLUSH) 0.9 %
1-10 SYRINGE (ML) INJECTION AS NEEDED
Status: DISCONTINUED | OUTPATIENT
Start: 2021-04-14 | End: 2021-04-15 | Stop reason: HOSPADM

## 2021-04-14 RX ORDER — FENTANYL CITRATE 50 UG/ML
50 INJECTION, SOLUTION INTRAMUSCULAR; INTRAVENOUS AS NEEDED
Status: DISCONTINUED | OUTPATIENT
Start: 2021-04-14 | End: 2021-04-15 | Stop reason: HOSPADM

## 2021-04-14 RX ORDER — MIDAZOLAM HYDROCHLORIDE 1 MG/ML
1 INJECTION INTRAMUSCULAR; INTRAVENOUS AS NEEDED
Status: DISCONTINUED | OUTPATIENT
Start: 2021-04-14 | End: 2021-04-15 | Stop reason: HOSPADM

## 2021-04-14 RX ORDER — LIDOCAINE HYDROCHLORIDE 10 MG/ML
1 INJECTION, SOLUTION INFILTRATION; PERINEURAL ONCE AS NEEDED
Status: DISCONTINUED | OUTPATIENT
Start: 2021-04-14 | End: 2021-04-15 | Stop reason: HOSPADM

## 2021-04-14 RX ORDER — METHYLPREDNISOLONE ACETATE 80 MG/ML
80 INJECTION, SUSPENSION INTRA-ARTICULAR; INTRALESIONAL; INTRAMUSCULAR; SOFT TISSUE ONCE
Status: COMPLETED | OUTPATIENT
Start: 2021-04-14 | End: 2021-04-14

## 2021-04-14 RX ADMIN — IOPAMIDOL 10 ML: 408 INJECTION, SOLUTION INTRATHECAL at 14:08

## 2021-04-14 RX ADMIN — METHYLPREDNISOLONE ACETATE 80 MG: 80 INJECTION, SUSPENSION INTRA-ARTICULAR; INTRALESIONAL; INTRAMUSCULAR; SOFT TISSUE at 14:08

## 2021-04-14 NOTE — DISCHARGE INSTRUCTIONS
What can I expect after the procedure?  Follow these instructions at home:  Injection site care  1. You may remove the bandage (dressing) after 24 hours.  2. Check your injection site every day for signs of infection. Check for:  ? Redness, swelling, or pain.  ? Fluid or blood.  ? Warmth.  ? Pus or a bad smell.  Managing pain, stiffness, and swelling  1. For 24 hours after the procedure:  ? Avoid using heat on the injection site.  ? Do not take baths, swim, or use a hot tub. You may take a shower.   2. If directed, put ice on the injection site. To do this:     3.    ? Put ice in a plastic bag.  ? Place a towel between your skin and the bag.  ? Leave the ice on for 20 minutes, 2-3 times a day.     Activity  · NO DRIVING FOR THE REST OF THE DAY IF receiving a sedative during your procedure.  · Return to your normal activities the next day as tolerated.  General instructions  · The injection site may feel sore.  · Take over-the-counter medications as directed on packaging and prescription medicines only as told by your health care provider who prescribes these.  · Keep all follow-up visits as told by your health care provider.   Contact a health care provider if:  1. You have any of these signs of infection:  ? Redness, swelling, or pain around your injection site.  ? Fluid or blood coming from your injection site.  ? Warmth coming from your injection site.  ? Pus or a bad smell coming from your injection site.  ? A fever.  2. You continue to have pain and soreness around the injection site, even after taking over-the-counter pain medicine.  3. You have severe, sudden, or lasting nausea or vomiting.  Get help right away if:  · You have severe pain at the injection site that is not relieved by medicines.  · You develop a severe headache or a stiff neck.  · You become sensitive to light.  · You have any new numbness or weakness in your legs or arms.  · You lose control of your bladder or bowel movements.  · You have  trouble breathing.  Summary  · An epidural steroid block is an injection of steroid medication and numbing medicine that is given into the epidural space.  This injection helps relieve pain caused by an irritated or swollen nerve root.Lumbar Epidural Steroid Injection Instructions  Plan includes:  1.  Lumbar epidural steroid injections, up to 3, spaced 4 weeks apart.  If pain control is acceptable after 1 or 2 injections, it was discussed with the patient that they may return for the subsequent injections if and when their pain returns.  The risks were discussed with the patient including failure of relief, worsening pain, Headache (post dural puncture headache), bleeding (epidural hematoma) and infection (epidural abscess or skin infection).  2.  Physical therapy exercises at home as prescribed by physical therapy or from the pain clinic handout (given to the patient).  Continuation of these exercises every day, or multiple times per week, even when the patient has good pain relief, was stressed to the patient as a preventative measure to decrease the frequency and severity of future pain episodes.  3.  Continue pain medicines as already prescribed.  If patient not currently taking any, it is recommended to begin Acetaminophen 1000 mg po q 8 hours.  If other medicines containing Acetaminophen are currently prescribed, maintain daily dose at 3000 mg.    4.  If they can tolerate NSAIDS, it is recommended to take Ibuprofen 600 mg po q 6 hours for 7 days during pain exacerbations.  Alternatively, they may substitute an NSAID of their choice (e.g. Aleve).  This may be taken at the same time as Acetaminophen.  5.  Heat and ice to the affected area as tolerated for pain control.  It was discussed that heating pads can cause burns.  6.  Daily low impact exercise such as walking or water exercise was recommended to maintain overall health and aid in weight control.   7.  Follow up as needed for subsequent injections.  8.   Patient was counseled to abstain from tobacco products.  ·

## 2021-04-14 NOTE — ANESTHESIA PROCEDURE NOTES
PAIN Epidural block    Pre-sedation assessment completed: 4/14/2021 2:02 PM    Patient reassessed immediately prior to procedure    Patient location during procedure: pain clinic  Start Time: 4/14/2021 2:02 PM  Stop Time: 4/14/2021 2:09 PM  Indication:at surgeon's request and procedure for pain  Performed By  Anesthesiologist: Jason Smyth MD  Preanesthetic Checklist  Completed: patient identified, site marked, risks and benefits discussed, surgical consent, monitors and equipment checked, pre-op evaluation and timeout performed  Additional Notes  Post-Op Diagnosis Codes:     * Lumbar neuritis (M54.16)     * Lumbago (M54.5)     * Degeneration of lumbar intervertebral disc (M51.36)    Prep:  Pt Position:prone  Sterile Tech:sterile barrier, mask and gloves  Prep:chlorhexidine gluconate and isopropyl alcohol  Monitoring:blood pressure monitoring, continuous pulse oximetry and EKG  Procedure:Sedation: no     Approach:right paramedian  Guidance: fluoroscopy  Location:lumbar  Level:L5-S1  Needle Gauge:20 G  Aspiration:negative  Test Dose:negative  Medications:  Depomedrol:80 mg  Preservative Free Saline:2mL  Isovue:2mL    Post Assessment:  Pt Tolerance:patient tolerated the procedure well with no apparent complications  Complications:no

## 2021-04-14 NOTE — H&P
[facility]    History and Physical    Patient Name: Barbie Hernandez  :  1932  MRN:  [id number]  Date of Admission: 2021    Subjective     Patient is a 88 y.o. female presents with chief complaint of chronic low back, hips: right and buttock pain.  Onset of symptoms was gradual starting several years ago.  Symptoms are associated/aggravated by nothing in particular. Symptoms improve with injection    The following portions of the patients history were reviewed and updated as appropriate: current medications, allergies, past medical history, past surgical history, past family history, past social history and problem list        She has had several epidural steroid injections.  She had excellent relief of her pain in the past.  She had an excess of 80% relief of pain for several months.  She now has intermittent pain that radiates into her right hip and about the L4 distribution.  Most the pain is fairly high in her hip and does not really go down into her foot.        Objective     Past Medical History:   Past Medical History:   Diagnosis Date   • A-fib (CMS/HCC)    • Arthritis    • Atrial fibrillation (CMS/HCC)    • Cancer (CMS/HCC)    • Coronary artery disease    • Disease of thyroid gland    • Hyperlipidemia    • Macular degeneration      Past Surgical History:   Past Surgical History:   Procedure Laterality Date   • CATARACT EXTRACTION Bilateral    • COLON RESECTION      INTESTINAL OBBSTRUCTION   • PACEMAKER IMPLANTATION     • PYLOROMYOTOMY     • UMBILICAL HERNIA REPAIR       Family History:   Family History   Problem Relation Age of Onset   • Breast cancer Mother    • OCD Mother         neurotic and recluse    • Lung cancer Father    • Diabetes type I Son    • Breast cancer Maternal Grandmother    • Breast cancer Other    • Ovarian cancer Sister    • Stroke Neg Hx    • Heart attack Neg Hx      Social History:   Social History     Socioeconomic History   • Marital status:      Spouse  name: Not on file   • Number of children: Not on file   • Years of education: Not on file   • Highest education level: Not on file   Tobacco Use   • Smoking status: Never Smoker   • Smokeless tobacco: Never Used   Substance and Sexual Activity   • Alcohol use: No   • Drug use: No   • Sexual activity: Defer     Birth control/protection: Post-menopausal       Vital Signs Range for the last 24 hours  Temperature: Temp:  [36.4 °C (97.5 °F)] 36.4 °C (97.5 °F)   Temp Source: Temp src: Oral   BP: BP: (148)/(78) 148/78   Pulse: Heart Rate:  [72] 72   Respirations: Resp:  [16] 16   SPO2: SpO2:  [93 %] 93 %   O2 Amount (l/min):     O2 Devices Device (Oxygen Therapy): room air   Weight:           --------------------------------------------------------------------------------    Current Outpatient Medications   Medication Sig Dispense Refill   • bevacizumab (AVASTIN) 100 MG/4ML chemo injection Infuse  into a venous catheter. Every 6 to 7 weeks only in right eye per patient     • CALCIUM PO Take  by mouth.     • carvedilol (COREG) 3.125 MG tablet Take 3.125 mg by mouth 2 (Two) Times a Day With Meals.     • denosumab (Prolia) 60 MG/ML solution prefilled syringe syringe Inject 60 mg under the skin into the appropriate area as directed 1 (One) Time.     • levothyroxine (SYNTHROID, LEVOTHROID) 125 MCG tablet Take 1 tablet by mouth Daily. 90 tablet 1     Current Facility-Administered Medications   Medication Dose Route Frequency Provider Last Rate Last Admin   • fentaNYL citrate (PF) (SUBLIMAZE) injection 50 mcg  50 mcg Intravenous PRN Jason Smyth MD       • iopamidol (ISOVUE-M 200) injection 41%  12 mL Epidural Once in imaging Jason Smyth MD       • lidocaine (XYLOCAINE) 1 % injection 1 mL  1 mL Intradermal Once PRN Jason Smyth MD       • methylPREDNISolone acetate (DEPO-medrol) injection 80 mg  80 mg Intra-articular Once Render, Jason Herrera MD       • midazolam (VERSED) injection 1 mg  1 mg Intravenous PRN  "Jason Smyth MD       • sodium chloride 0.9 % flush 1-10 mL  1-10 mL Intravenous PRN Jason Smyth MD           --------------------------------------------------------------------------------  Assessment/Plan      Anesthesia Evaluation           Pain impairs ability to perform ADLs: Ambulation       Airway   Mallampati: II  TM distance: >3 FB  Neck ROM: full  Dental - normal exam     Pulmonary - negative pulmonary ROS and normal exam   Cardiovascular     Rhythm: regular    (+) CAD, dysrhythmias Atrial Fib, Paroxysmal Atrial Fib,   (-) carotid bruits, peripheral edema      Neuro/Psych  (-) left straight leg raise test, right straight leg raise test  GI/Hepatic/Renal/Endo    (+)   renal disease CRI,     Musculoskeletal     Abdominal  - normal exam   Substance History      OB/GYN          Other                   Diagnosis and Plan    Treatment Plan  ASA 3      Procedures: Lumbar Epidural Steroid Injection(LESI), With fluoroscopy,           Discussed with patient risk and benefits of GABE including but not limited to : Bleeding, infection, PDPH, inadvertant spinal anesthetic, worsening pain, hyperglycemia, hypertension, CHF, nerve damage, steroid \"toxicity\" and AVN of hips.  Pt agrees to proceed.    Diagnosis     * Lumbar neuritis [M54.16]     * Lumbago [M54.5]     * Degeneration of lumbar intervertebral disc [M51.36]                    "

## 2021-05-26 ENCOUNTER — ANESTHESIA EVENT (OUTPATIENT)
Dept: PAIN MEDICINE | Facility: HOSPITAL | Age: 86
End: 2021-05-26

## 2021-05-26 ENCOUNTER — HOSPITAL ENCOUNTER (OUTPATIENT)
Dept: GENERAL RADIOLOGY | Facility: HOSPITAL | Age: 86
Discharge: HOME | End: 2021-05-26

## 2021-05-26 ENCOUNTER — HOSPITAL ENCOUNTER (OUTPATIENT)
Dept: PAIN MEDICINE | Facility: HOSPITAL | Age: 86
Discharge: HOME | End: 2021-05-26

## 2021-05-26 ENCOUNTER — ANESTHESIA (OUTPATIENT)
Dept: PAIN MEDICINE | Facility: HOSPITAL | Age: 86
End: 2021-05-26

## 2021-05-26 VITALS
DIASTOLIC BLOOD PRESSURE: 92 MMHG | TEMPERATURE: 97.1 F | SYSTOLIC BLOOD PRESSURE: 124 MMHG | OXYGEN SATURATION: 96 % | RESPIRATION RATE: 14 BRPM | HEART RATE: 75 BPM

## 2021-05-26 DIAGNOSIS — R52 PAIN: ICD-10-CM

## 2021-05-26 DIAGNOSIS — M54.16 LUMBAR NEURITIS: ICD-10-CM

## 2021-05-26 PROCEDURE — 25010000002 METHYLPREDNISOLONE PER 80 MG: Performed by: ANESTHESIOLOGY

## 2021-05-26 PROCEDURE — 77003 FLUOROGUIDE FOR SPINE INJECT: CPT

## 2021-05-26 PROCEDURE — 0 IOPAMIDOL 41 % SOLUTION: Performed by: ANESTHESIOLOGY

## 2021-05-26 RX ORDER — FENTANYL CITRATE 50 UG/ML
50 INJECTION, SOLUTION INTRAMUSCULAR; INTRAVENOUS AS NEEDED
Status: DISCONTINUED | OUTPATIENT
Start: 2021-05-26 | End: 2021-05-27 | Stop reason: HOSPADM

## 2021-05-26 RX ORDER — METHYLPREDNISOLONE ACETATE 80 MG/ML
80 INJECTION, SUSPENSION INTRA-ARTICULAR; INTRALESIONAL; INTRAMUSCULAR; SOFT TISSUE ONCE
Status: COMPLETED | OUTPATIENT
Start: 2021-05-26 | End: 2021-05-26

## 2021-05-26 RX ORDER — SODIUM CHLORIDE 0.9 % (FLUSH) 0.9 %
1-10 SYRINGE (ML) INJECTION AS NEEDED
Status: DISCONTINUED | OUTPATIENT
Start: 2021-05-26 | End: 2021-05-27 | Stop reason: HOSPADM

## 2021-05-26 RX ORDER — MIDAZOLAM HYDROCHLORIDE 1 MG/ML
1 INJECTION INTRAMUSCULAR; INTRAVENOUS AS NEEDED
Status: DISCONTINUED | OUTPATIENT
Start: 2021-05-26 | End: 2021-05-27 | Stop reason: HOSPADM

## 2021-05-26 RX ORDER — LIDOCAINE HYDROCHLORIDE 10 MG/ML
1 INJECTION, SOLUTION INFILTRATION; PERINEURAL ONCE AS NEEDED
Status: DISCONTINUED | OUTPATIENT
Start: 2021-05-26 | End: 2021-05-27 | Stop reason: HOSPADM

## 2021-05-26 RX ADMIN — METHYLPREDNISOLONE ACETATE 80 MG: 80 INJECTION, SUSPENSION INTRA-ARTICULAR; INTRALESIONAL; INTRAMUSCULAR; SOFT TISSUE at 14:18

## 2021-05-26 RX ADMIN — IOPAMIDOL 10 ML: 408 INJECTION, SOLUTION INTRATHECAL at 14:18

## 2021-05-26 NOTE — ANESTHESIA PROCEDURE NOTES
PAIN Epidural block      Patient reassessed immediately prior to procedure    Patient location during procedure: pain clinic  Indication:procedure for pain  Performed By  Anesthesiologist: Guillaume Roberson MD  Preanesthetic Checklist  Completed: patient identified and risks and benefits discussed  Additional Notes  Diagnosis:     Post-Op Diagnosis Codes:     * Lumbar degenerative disc disease (M51.36)     * Lumbar neuritis (M54.16)    Sedation:  none    A lumbar epidural steroid injection with fluoroscopic guidance and an Isovue dye epidurogram was performed.  Under fluoroscopic guidance, the epidural space was identified and accessed, confirmed by loss of resistance to saline followed by injection of Isovue dye, which shows a good epidurogram.  The above medications were injected uneventfully.    Prep:  Pt Position:prone  Sterile Tech:cap, gloves, mask and sterile barrier  Prep:chlorhexidine gluconate and isopropyl alcohol  Monitoring:blood pressure monitoring, continuous pulse oximetry and EKG  Procedure:Sedation: no     Approach:right paramedian  Guidance: fluoroscopy  Location:lumbar  Interspace: L5-S1.  Needle Type:Tuohy  Needle Gauge:20  Aspiration:negative  Medications:  Depomedrol:80  Preservative Free Saline:1mL  Isovue:1mL  Comments:Isovue dye reveals good epidurogram  Post Assessment:  Pt Tolerance:patient tolerated the procedure well with no apparent complications  Complications:no

## 2021-05-26 NOTE — H&P
INTERVAL HISTORY:    The patient returns for another Lumbar epidural steroid injection today.  They have received 50% improvement since their last injection with a pain level of 3/10 at its worst recently.        Current Outpatient Medications on File Prior to Encounter   Medication Sig Dispense Refill   • bevacizumab (AVASTIN) 100 MG/4ML chemo injection Infuse  into a venous catheter. Every 6 to 7 weeks only in right eye per patient     • CALCIUM PO Take  by mouth.     • carvedilol (COREG) 3.125 MG tablet Take 3.125 mg by mouth 2 (Two) Times a Day With Meals.     • denosumab (Prolia) 60 MG/ML solution prefilled syringe syringe Inject 60 mg under the skin into the appropriate area as directed 1 (One) Time.     • levothyroxine (SYNTHROID, LEVOTHROID) 125 MCG tablet Take 1 tablet by mouth Daily. 90 tablet 1     No current facility-administered medications on file prior to encounter.       Past Medical History:   Diagnosis Date   • A-fib (CMS/HCC)    • Arthritis    • Atrial fibrillation (CMS/HCC)    • Cancer (CMS/HCC)    • Coronary artery disease    • Disease of thyroid gland    • Hyperlipidemia    • Macular degeneration        No hematologic infectious or constitutional symptoms  Negative screen for CAITIE      Exam:  LMP  (LMP Unknown) Comment: menopause  Airway Mallampatti 2  Alert and oriented      Diagnosis:  Post-Op Diagnosis Codes:     * Lumbar degenerative disc disease [M51.36]     * Lumbar neuritis [M54.16]    Plan:  Lumbar 5 epidural steroid injection under fluoroscopic guidance    I have encouraged them to continue:  1.  Physical therapy exercises at home as prescribed by physical therapy or from the pain clinic handout (given to the patient).  Continuation of these exercises every day, or multiple times per week, even when the patient has good pain relief, was stressed to the patient as a preventative measure to decrease the frequency and severity of future pain episodes.  2.  Continue pain medicines as  already prescribed.  If patient not currently taking any, it is recommended to begin Acetaminophen 1000 mg po q 8 hours.  If other medicines containing Acetaminophen are currently prescribed, maintain daily dose at 3000mg.    3.  If they can tolerate NSAIDS, it is recommended to take Ibuprofen 600 mg po q 6 hours for 7 days during pain exacerbations.   Alternatively, they may substitute an NSAID of their choice (e.g. Aleve)  4.  Heat and ice to the affected area as tolerated for pain control.  It was discussed that heating pads can cause burns.  5.  Low impact exercise such as walking or water exercise was recommended to maintain overall health and aid in weight control.   6.  Follow up as needed for subsequent injections.  7.  Patient was counseled to abstain from tobacco products.

## 2021-06-03 ENCOUNTER — OFFICE VISIT (OUTPATIENT)
Dept: FAMILY MEDICINE CLINIC | Facility: CLINIC | Age: 86
End: 2021-06-03

## 2021-06-03 VITALS
TEMPERATURE: 97.5 F | OXYGEN SATURATION: 98 % | WEIGHT: 136 LBS | SYSTOLIC BLOOD PRESSURE: 106 MMHG | DIASTOLIC BLOOD PRESSURE: 68 MMHG | HEIGHT: 64 IN | BODY MASS INDEX: 23.22 KG/M2 | HEART RATE: 83 BPM

## 2021-06-03 DIAGNOSIS — M54.41 CHRONIC BILATERAL LOW BACK PAIN WITH BILATERAL SCIATICA: ICD-10-CM

## 2021-06-03 DIAGNOSIS — I48.0 PAROXYSMAL ATRIAL FIBRILLATION (HCC): ICD-10-CM

## 2021-06-03 DIAGNOSIS — M81.0 AGE-RELATED OSTEOPOROSIS WITHOUT CURRENT PATHOLOGICAL FRACTURE: Primary | ICD-10-CM

## 2021-06-03 DIAGNOSIS — R29.898 WEAKNESS OF BOTH LOWER EXTREMITIES: ICD-10-CM

## 2021-06-03 DIAGNOSIS — E03.8 OTHER SPECIFIED HYPOTHYROIDISM: ICD-10-CM

## 2021-06-03 DIAGNOSIS — M54.42 CHRONIC BILATERAL LOW BACK PAIN WITH BILATERAL SCIATICA: ICD-10-CM

## 2021-06-03 DIAGNOSIS — G89.29 CHRONIC BILATERAL LOW BACK PAIN WITH BILATERAL SCIATICA: ICD-10-CM

## 2021-06-03 PROBLEM — I50.9 CONGESTIVE HEART FAILURE (HCC): Status: ACTIVE | Noted: 2017-06-07

## 2021-06-03 PROCEDURE — 99214 OFFICE O/P EST MOD 30 MIN: CPT | Performed by: FAMILY MEDICINE

## 2021-06-03 NOTE — PROGRESS NOTES
"Chief Complaint  Fatigue (muscle weakness severe back pain follow up  some joint pains and arthritis aches )    Subjective          Barbie Hernandez presents to [facility]  History of Present Illness    Patient with bilateral leg weakness without substatial improvement I her weakness.  Then she had worsening lower back pain - she did see pain management and had an epidural injection - she does feel like the second one was more helpful.  Cant do MRI with pacemaker in place. She has rearranged her bathroom, decreased ability to walk, now with weakness.  Saw dr garber with spine surgery.     Now also having R sided macular degeneration and impacting her sight, needing some glasses.  It was an optho and it was not the right prescription.  She does feel like her ne glasses are helping, her favorite thing to do is read,     Hearing is declining, got a new audiology test with expensive hearing aids and had 80% hearing loss.  Hard to understand or hear what is going on - hard to connect     Now starting to feel depressed with lower back pian, loss of sight and hearing,.  Feeling some depressed.  She was down, starting to to oversleep, but starting to lift.      Objective   Vital Signs:   /68   Pulse 83   Temp 97.5 °F (36.4 °C)   Ht 161.3 cm (63.5\")   Wt 61.7 kg (136 lb)   SpO2 98%   BMI 23.71 kg/m²     Physical Exam  Vitals and nursing note reviewed.   Constitutional:       General: She is not in acute distress.     Appearance: She is well-developed.   HENT:      Head: Normocephalic.      Comments: Bilateral cerumen impaction     Nose: Nose normal.   Cardiovascular:      Rate and Rhythm: Normal rate and regular rhythm.      Heart sounds: Normal heart sounds. No murmur heard.        Comments: Pace maker palpable closer to axillary region, not tender, no warmth or redness   Pulmonary:      Effort: Pulmonary effort is normal. No respiratory distress.      Breath sounds: Normal breath " sounds.   Musculoskeletal:         General: Normal range of motion.   Skin:     General: Skin is warm and dry.      Findings: No rash.   Neurological:      Mental Status: She is alert and oriented to person, place, and time.   Psychiatric:         Behavior: Behavior normal.         Thought Content: Thought content normal.         Judgment: Judgment normal.        Result Review :                 Assessment and Plan    Diagnoses and all orders for this visit:    1. Age-related osteoporosis without current pathological fracture (Primary)  -     Comprehensive Metabolic Panel  -     Vitamin D 25 Hydroxy    2. Other specified hypothyroidism  -     TSH Rfx On Abnormal To Free T4    3. Paroxysmal atrial fibrillation (CMS/HCC)  -     Comprehensive Metabolic Panel  -     CBC & Differential  -     TSH Rfx On Abnormal To Free T4    4. Weakness of both lower extremities  -     CT Lumbar Spine With Contrast; Future    5. Chronic bilateral low back pain with bilateral sciatica  -     CT Lumbar Spine With Contrast; Future    Is an 88-year-old female who has had a substantial decline over the last year with regards to worsening lower back pain and bilateral leg weakness.  She has done multiple months of physical therapy without ability to regain strength in her legs.  This is affecting her quality of life substantially.  She does have known osteoporosis with a history of chronic lower back pain.  X-ray was reassuring and January 2020.  However her symptoms have progressively worsened since then.  I do think getting further imaging with a CT scan may be helpful to better understand the causes of her symptoms and to see if there is any lesion, acute fracture thinks that could potentially be treated and improve her quality of life.  Overall continue with exercising, protein intake and fluids.  Labs as above.  She does have bilateral ceruminosis, recommended she call her ENT to have it removed.  Has also had regular follow-up with her  cardiologist, last seen yesterday.  Things have been stable and overall good.      Follow Up   Return in about 6 months (around 12/3/2021), or if symptoms worsen or fail to improve, for Annual Exam with fasting labs prior.  Patient was given instructions and counseling regarding her condition or for health maintenance advice. Please see specific information pulled into the AVS if appropriate. Medical assistant and I wore mask and eyewear protection during entire encounter.  Patient wore mask.    Tika Machado MD

## 2021-06-04 LAB
25(OH)D3+25(OH)D2 SERPL-MCNC: 44.6 NG/ML (ref 30–100)
ALBUMIN SERPL-MCNC: 4.1 G/DL (ref 3.5–5.2)
ALBUMIN/GLOB SERPL: 1.8 G/DL
ALP SERPL-CCNC: 48 U/L (ref 39–117)
ALT SERPL-CCNC: 11 U/L (ref 1–33)
AST SERPL-CCNC: 16 U/L (ref 1–32)
BASOPHILS # BLD AUTO: 0.04 10*3/MM3 (ref 0–0.2)
BASOPHILS NFR BLD AUTO: 0.8 % (ref 0–1.5)
BILIRUB SERPL-MCNC: 0.4 MG/DL (ref 0–1.2)
BUN SERPL-MCNC: 21 MG/DL (ref 8–23)
BUN/CREAT SERPL: 23.9 (ref 7–25)
CALCIUM SERPL-MCNC: 9.3 MG/DL (ref 8.6–10.5)
CHLORIDE SERPL-SCNC: 105 MMOL/L (ref 98–107)
CO2 SERPL-SCNC: 27.1 MMOL/L (ref 22–29)
CREAT SERPL-MCNC: 0.88 MG/DL (ref 0.57–1)
EOSINOPHIL # BLD AUTO: 0.15 10*3/MM3 (ref 0–0.4)
EOSINOPHIL NFR BLD AUTO: 2.9 % (ref 0.3–6.2)
ERYTHROCYTE [DISTWIDTH] IN BLOOD BY AUTOMATED COUNT: 13.1 % (ref 12.3–15.4)
GLOBULIN SER CALC-MCNC: 2.3 GM/DL
GLUCOSE SERPL-MCNC: 108 MG/DL (ref 65–99)
HCT VFR BLD AUTO: 42 % (ref 34–46.6)
HGB BLD-MCNC: 13.8 G/DL (ref 12–15.9)
IMM GRANULOCYTES # BLD AUTO: 0.02 10*3/MM3 (ref 0–0.05)
IMM GRANULOCYTES NFR BLD AUTO: 0.4 % (ref 0–0.5)
LYMPHOCYTES # BLD AUTO: 1.2 10*3/MM3 (ref 0.7–3.1)
LYMPHOCYTES NFR BLD AUTO: 23.2 % (ref 19.6–45.3)
MCH RBC QN AUTO: 30.1 PG (ref 26.6–33)
MCHC RBC AUTO-ENTMCNC: 32.9 G/DL (ref 31.5–35.7)
MCV RBC AUTO: 91.7 FL (ref 79–97)
MONOCYTES # BLD AUTO: 0.72 10*3/MM3 (ref 0.1–0.9)
MONOCYTES NFR BLD AUTO: 13.9 % (ref 5–12)
NEUTROPHILS # BLD AUTO: 3.04 10*3/MM3 (ref 1.7–7)
NEUTROPHILS NFR BLD AUTO: 58.8 % (ref 42.7–76)
NRBC BLD AUTO-RTO: 0 /100 WBC (ref 0–0.2)
PLATELET # BLD AUTO: 183 10*3/MM3 (ref 140–450)
POTASSIUM SERPL-SCNC: 4.2 MMOL/L (ref 3.5–5.2)
PROT SERPL-MCNC: 6.4 G/DL (ref 6–8.5)
RBC # BLD AUTO: 4.58 10*6/MM3 (ref 3.77–5.28)
SODIUM SERPL-SCNC: 140 MMOL/L (ref 136–145)
TSH SERPL DL<=0.005 MIU/L-ACNC: 1.31 UIU/ML (ref 0.27–4.2)
WBC # BLD AUTO: 5.17 10*3/MM3 (ref 3.4–10.8)

## 2021-06-09 ENCOUNTER — APPOINTMENT (OUTPATIENT)
Dept: WOMENS IMAGING | Facility: HOSPITAL | Age: 86
End: 2021-06-09

## 2021-06-09 PROCEDURE — 77063 BREAST TOMOSYNTHESIS BI: CPT | Performed by: RADIOLOGY

## 2021-06-09 PROCEDURE — 77067 SCR MAMMO BI INCL CAD: CPT | Performed by: RADIOLOGY

## 2021-06-14 NOTE — PROGRESS NOTES
Please call patient back with results.  The labs has resulted as normal kidney and liver function, normal thyroid and vitamin D..  Please let us know if you have further questions.     Thank you

## 2021-06-21 ENCOUNTER — APPOINTMENT (OUTPATIENT)
Dept: CT IMAGING | Facility: HOSPITAL | Age: 86
End: 2021-06-21

## 2021-07-06 ENCOUNTER — HOSPITAL ENCOUNTER (OUTPATIENT)
Dept: CT IMAGING | Facility: HOSPITAL | Age: 86
Discharge: HOME | End: 2021-07-06
Admitting: FAMILY MEDICINE

## 2021-07-06 DIAGNOSIS — M54.42 CHRONIC BILATERAL LOW BACK PAIN WITH BILATERAL SCIATICA: ICD-10-CM

## 2021-07-06 DIAGNOSIS — G89.29 CHRONIC BILATERAL LOW BACK PAIN WITH BILATERAL SCIATICA: ICD-10-CM

## 2021-07-06 DIAGNOSIS — M54.41 CHRONIC BILATERAL LOW BACK PAIN WITH BILATERAL SCIATICA: ICD-10-CM

## 2021-07-06 DIAGNOSIS — R29.898 WEAKNESS OF BOTH LOWER EXTREMITIES: ICD-10-CM

## 2021-07-06 PROCEDURE — 72131 CT LUMBAR SPINE W/O DYE: CPT

## 2021-07-14 NOTE — PROGRESS NOTES
Please call patient back with results.  The CT scan of the lumbar spine has resulted as positive for multiple abnormalities with degeneration, some pressing of the arthritic changes onto the central canal where the spinal cord is located, some disc bulge with pretty extensive and diffuse arthritic changes.  I do think this is likely contributing to the worsening of her symptoms.  If you would like to see a spine surgeon we could discuss this although I do not know that I would recommend surgery right now.  You have already received some epidurals recently and would know if these are giving some relief.  I am happy to talk more about this, you are welcome to make an appointment at your availability to discuss further and see if there is other treatments we can do to give better quality of life.  Please let us know if you have further questions.     Thank you

## 2021-07-22 ENCOUNTER — OFFICE VISIT (OUTPATIENT)
Dept: FAMILY MEDICINE CLINIC | Facility: CLINIC | Age: 86
End: 2021-07-22

## 2021-07-22 VITALS
HEIGHT: 64 IN | BODY MASS INDEX: 22.88 KG/M2 | WEIGHT: 134 LBS | SYSTOLIC BLOOD PRESSURE: 114 MMHG | DIASTOLIC BLOOD PRESSURE: 72 MMHG | HEART RATE: 78 BPM | OXYGEN SATURATION: 98 % | TEMPERATURE: 97.8 F

## 2021-07-22 DIAGNOSIS — M43.16 SPONDYLOLISTHESIS OF LUMBAR REGION: ICD-10-CM

## 2021-07-22 DIAGNOSIS — R35.0 FREQUENCY OF URINATION: ICD-10-CM

## 2021-07-22 DIAGNOSIS — M54.50 ACUTE RIGHT-SIDED LOW BACK PAIN WITHOUT SCIATICA: ICD-10-CM

## 2021-07-22 DIAGNOSIS — M81.0 AGE-RELATED OSTEOPOROSIS WITHOUT CURRENT PATHOLOGICAL FRACTURE: Primary | ICD-10-CM

## 2021-07-22 PROCEDURE — 81003 URINALYSIS AUTO W/O SCOPE: CPT | Performed by: FAMILY MEDICINE

## 2021-07-22 PROCEDURE — 99213 OFFICE O/P EST LOW 20 MIN: CPT | Performed by: FAMILY MEDICINE

## 2021-07-22 RX ORDER — GABAPENTIN 100 MG/1
100 CAPSULE ORAL 3 TIMES DAILY PRN
Qty: 90 CAPSULE | Refills: 1 | Status: SHIPPED | OUTPATIENT
Start: 2021-07-22 | End: 2021-08-23 | Stop reason: SDUPTHER

## 2021-07-22 NOTE — PROGRESS NOTES
"Chief Complaint  Back Pain (lumbar spine ct of spine follow up )    Subjective          Barbie Hernandez presents to [facility]  History of Present Illness  Pleasant 88-year-old female here for lower back pain that has not been well controlled.  Is a chronic problem, recent CT scan of the lumbar spine showing advanced degeneration, spondylolisthesis and osteopruritic changes. She has been seen by pain management, physical therapy.  Her last epidural injection was about 2 months ago.  Her pain waxes and waines, alternates R aned L side, feels like today has beeb a worst flair.  Some radiation to the anterior R thigh.    Objective   Vital Signs:   /72   Pulse 78   Temp 97.8 °F (36.6 °C)   Ht 161.3 cm (63.5\")   Wt 60.8 kg (134 lb)   SpO2 98%   BMI 23.36 kg/m²     Physical Exam  Vitals and nursing note reviewed.   Constitutional:       General: She is not in acute distress.     Appearance: She is well-developed.   HENT:      Head: Normocephalic.      Nose: Nose normal.   Eyes:      General: No scleral icterus.  Pulmonary:      Effort: Pulmonary effort is normal. No respiratory distress.   Musculoskeletal:         General: Normal range of motion.   Skin:     General: Skin is warm and dry.      Findings: No rash.   Neurological:      Mental Status: She is alert and oriented to person, place, and time.   Psychiatric:         Behavior: Behavior normal.         Thought Content: Thought content normal.         Judgment: Judgment normal.        Result Review :   The following data was reviewed by: Tika Machado MD on 07/22/2021:  Office Visit on 07/22/2021   Component Date Value Ref Range Status   • Color 07/23/2021 Yellow  Yellow, Straw, Dark Yellow, Cari Final   • Clarity, UA 07/23/2021 Clear  Clear Final   • Specific Gravity  07/23/2021 1.015  1.005 - 1.030 Final   • pH, Urine 07/23/2021 7.0  5.0 - 8.0 Final   • Leukocytes 07/23/2021 Negative  Negative Final   • Nitrite, UA " 07/23/2021 Negative  Negative Final   • Protein, POC 07/23/2021 Negative  Negative mg/dL Final   • Glucose, UA 07/23/2021 Negative  Negative, 1000 mg/dL (3+) mg/dL Final   • Ketones, UA 07/23/2021 Negative  Negative Final   • Urobilinogen, UA 07/23/2021 Normal  Normal Final   • Bilirubin 07/23/2021 Negative  Negative Final   • Blood, UA 07/23/2021 Negative  Negative Final       Data reviewed: Radiologic studies CT scan of lumbar spine          Assessment and Plan    Diagnoses and all orders for this visit:    1. Age-related osteoporosis without current pathological fracture (Primary)  -     gabapentin (NEURONTIN) 100 MG capsule; Take 1 capsule by mouth 3 (Three) Times a Day As Needed (back pain).  Dispense: 90 capsule; Refill: 1  -     Ambulatory Referral to Orthopedic Surgery    2. Spondylolisthesis of lumbar region  -     gabapentin (NEURONTIN) 100 MG capsule; Take 1 capsule by mouth 3 (Three) Times a Day As Needed (back pain).  Dispense: 90 capsule; Refill: 1  -     Ambulatory Referral to Orthopedic Surgery    3. Acute right-sided low back pain without sciatica  -     gabapentin (NEURONTIN) 100 MG capsule; Take 1 capsule by mouth 3 (Three) Times a Day As Needed (back pain).  Dispense: 90 capsule; Refill: 1  -     Ambulatory Referral to Orthopedic Surgery  -     Cancel: POC Urinalysis Dipstick, Automated    4. Frequency of urination  -     POC Urinalysis Dipstick, Automated  -     Urine Culture - Urine, Urine, Clean Catch    Severe chronic back pain that greatly impacts her quality of life.  We reviewed the severe disease in her back - she is motivated to have better quality of life wanting to maintain independence.  Recommend FU with spine, pain  Mgmt, and start Gabapentin to improve symptoms.  Discussed risks, benefits and adverse effects.  Will FU in one month if improving.     Some increased urinary frequency that is not well controlled. Eval for UTI.  Overall reassuring.  Culture pending.       Follow Up    Return in 6 weeks (on 9/2/2021), or if symptoms worsen or fail to improve, for Recheck back pain .  Patient was given instructions and counseling regarding her condition or for health maintenance advice. Please see specific information pulled into the AVS if appropriate. Medical assistant and I wore mask and eyewear protection during entire encounter.  Patient wore mask.    Tika Machado MD

## 2021-07-23 LAB
BILIRUB BLD-MCNC: NEGATIVE MG/DL
CLARITY, POC: CLEAR
COLOR UR: YELLOW
GLUCOSE UR STRIP-MCNC: NEGATIVE MG/DL
KETONES UR QL: NEGATIVE
LEUKOCYTE EST, POC: NEGATIVE
NITRITE UR-MCNC: NEGATIVE MG/ML
PH UR: 7 [PH] (ref 5–8)
PROT UR STRIP-MCNC: NEGATIVE MG/DL
RBC # UR STRIP: NEGATIVE /UL
SP GR UR: 1.01 (ref 1–1.03)
UROBILINOGEN UR QL: NORMAL

## 2021-07-25 LAB
BACTERIA UR CULT: NORMAL
BACTERIA UR CULT: NORMAL

## 2021-07-27 RX ORDER — LEVOTHYROXINE SODIUM 0.12 MG/1
TABLET ORAL
Qty: 90 TABLET | Refills: 1 | Status: SHIPPED | OUTPATIENT
Start: 2021-07-27 | End: 2022-01-21

## 2021-08-23 DIAGNOSIS — M81.0 AGE-RELATED OSTEOPOROSIS WITHOUT CURRENT PATHOLOGICAL FRACTURE: ICD-10-CM

## 2021-08-23 DIAGNOSIS — M54.50 ACUTE RIGHT-SIDED LOW BACK PAIN WITHOUT SCIATICA: ICD-10-CM

## 2021-08-23 DIAGNOSIS — M43.16 SPONDYLOLISTHESIS OF LUMBAR REGION: ICD-10-CM

## 2021-08-23 RX ORDER — GABAPENTIN 100 MG/1
100 CAPSULE ORAL 3 TIMES DAILY PRN
Qty: 270 CAPSULE | Refills: 0 | Status: SHIPPED | OUTPATIENT
Start: 2021-08-23 | End: 2021-11-22 | Stop reason: SDUPTHER

## 2021-08-23 NOTE — TELEPHONE ENCOUNTER
Patient requesting ninety day supply of gabapentin 100 mg cap, three times daily. Patient last evaluated on 7/22, next appointment scheduled for 9/2. Please advise.

## 2021-09-02 ENCOUNTER — OFFICE VISIT (OUTPATIENT)
Dept: FAMILY MEDICINE CLINIC | Facility: CLINIC | Age: 86
End: 2021-09-02

## 2021-09-02 VITALS
WEIGHT: 136.6 LBS | HEIGHT: 64 IN | DIASTOLIC BLOOD PRESSURE: 72 MMHG | SYSTOLIC BLOOD PRESSURE: 126 MMHG | HEART RATE: 78 BPM | BODY MASS INDEX: 23.32 KG/M2 | TEMPERATURE: 98.7 F

## 2021-09-02 DIAGNOSIS — M81.0 AGE-RELATED OSTEOPOROSIS WITHOUT CURRENT PATHOLOGICAL FRACTURE: ICD-10-CM

## 2021-09-02 DIAGNOSIS — M43.16 SPONDYLOLISTHESIS OF LUMBAR REGION: Primary | ICD-10-CM

## 2021-09-02 DIAGNOSIS — M54.50 ACUTE RIGHT-SIDED LOW BACK PAIN WITHOUT SCIATICA: ICD-10-CM

## 2021-09-02 DIAGNOSIS — Z51.81 ENCOUNTER FOR THERAPEUTIC DRUG LEVEL MONITORING: ICD-10-CM

## 2021-09-02 PROCEDURE — 99213 OFFICE O/P EST LOW 20 MIN: CPT | Performed by: FAMILY MEDICINE

## 2021-09-02 NOTE — PROGRESS NOTES
"Chief Complaint  Back Pain (doing much better she bring her log of  pain day to talk )    Subjective          Barbie Hernandez presents to [facility]  History of Present Illness  Chronic back pain not well controlled, severe pain.  She started gabapentin 100 mg 3 times daily about a month ago with substantial improvement.  She feels that her quality of life has returned.  No adverse effects, drowsiness or grogginess.    Objective   Vital Signs:   /72   Pulse 78   Temp 98.7 °F (37.1 °C)   Ht 161.3 cm (63.5\")   Wt 62 kg (136 lb 9.6 oz)   BMI 23.82 kg/m²     Physical Exam  Vitals and nursing note reviewed.   Constitutional:       General: She is not in acute distress.     Appearance: She is well-developed.   HENT:      Head: Normocephalic.      Nose: Nose normal.   Eyes:      General: No scleral icterus.  Pulmonary:      Effort: Pulmonary effort is normal. No respiratory distress.   Musculoskeletal:         General: Normal range of motion.   Skin:     General: Skin is warm and dry.      Findings: No rash.   Neurological:      Mental Status: She is alert and oriented to person, place, and time.   Psychiatric:         Behavior: Behavior normal.         Thought Content: Thought content normal.         Judgment: Judgment normal.        Result Review :                 Assessment and Plan    Diagnoses and all orders for this visit:    1. Spondylolisthesis of lumbar region (Primary)    2. Age-related osteoporosis without current pathological fracture    3. Acute right-sided low back pain without sciatica    Pleasant 88-year-old female here for chronic lower back pain with some radiculopathy.  She started gabapentin 100 mg 3 times daily with substantial improvement.  Okay to continue with this.  The patient has read and signed the [facility] Controlled Substance Contract.  I will continue to see patient for regular follow up appointments.  They are well controlled on their " medication.  SHELIA has been reviewed by me and is updated every 3 months. The patient is aware of the potential for addiction and dependence.      Follow Up   Return in about 3 months (around 12/2/2021), or if symptoms worsen or fail to improve, for Recheck.  Patient was given instructions and counseling regarding her condition or for health maintenance advice. Please see specific information pulled into the AVS if appropriate. Medical assistant and I wore mask and eyewear protection during entire encounter.  Patient wore mask.    Tika Machado MD

## 2021-09-09 LAB — DRUGS UR: NORMAL

## 2021-09-22 ENCOUNTER — INFUSION (OUTPATIENT)
Dept: ONCOLOGY | Facility: HOSPITAL | Age: 86
End: 2021-09-22

## 2021-09-22 ENCOUNTER — LAB (OUTPATIENT)
Dept: OTHER | Facility: HOSPITAL | Age: 86
End: 2021-09-22

## 2021-09-22 VITALS — RESPIRATION RATE: 18 BRPM | WEIGHT: 137 LBS | BODY MASS INDEX: 23.89 KG/M2

## 2021-09-22 DIAGNOSIS — M81.0 AGE-RELATED OSTEOPOROSIS WITHOUT CURRENT PATHOLOGICAL FRACTURE: Primary | ICD-10-CM

## 2021-09-22 PROCEDURE — 25010000002 DENOSUMAB 60 MG/ML SOLUTION PREFILLED SYRINGE: Performed by: OBSTETRICS & GYNECOLOGY

## 2021-09-22 PROCEDURE — 84100 ASSAY OF PHOSPHORUS: CPT | Performed by: OBSTETRICS & GYNECOLOGY

## 2021-09-22 PROCEDURE — 83735 ASSAY OF MAGNESIUM: CPT | Performed by: OBSTETRICS & GYNECOLOGY

## 2021-09-22 PROCEDURE — 80053 COMPREHEN METABOLIC PANEL: CPT | Performed by: OBSTETRICS & GYNECOLOGY

## 2021-09-22 PROCEDURE — 96372 THER/PROPH/DIAG INJ SC/IM: CPT

## 2021-09-22 RX ADMIN — DENOSUMAB 60 MG: 60 INJECTION SUBCUTANEOUS at 13:21

## 2021-09-22 NOTE — NURSING NOTE
Arrived  for prolia injection. Indication and side effects reviewed. Denies recent dental work. Labs and medications verified. Pt unsure if she takes a separate vitamin D supplement. Advised her it is recommended and could ask Dr. Pascual. She vu. Prolia administered in R arm without incidence. Instructed to call prescribing MD for any concerns or questions and instructed on how to schedule future appts.  Pt vu and discharged ambulatory.

## 2021-11-15 DIAGNOSIS — E55.9 VITAMIN D DEFICIENCY, UNSPECIFIED: ICD-10-CM

## 2021-11-15 DIAGNOSIS — Z13.220 SCREENING FOR LIPID DISORDERS: ICD-10-CM

## 2021-11-15 DIAGNOSIS — I50.9 CONGESTIVE HEART FAILURE, UNSPECIFIED HF CHRONICITY, UNSPECIFIED HEART FAILURE TYPE (HCC): ICD-10-CM

## 2021-11-15 DIAGNOSIS — E03.8 OTHER SPECIFIED HYPOTHYROIDISM: Primary | ICD-10-CM

## 2021-11-15 DIAGNOSIS — Z00.00 MEDICARE ANNUAL WELLNESS VISIT, SUBSEQUENT: ICD-10-CM

## 2021-11-22 DIAGNOSIS — M54.50 ACUTE RIGHT-SIDED LOW BACK PAIN WITHOUT SCIATICA: ICD-10-CM

## 2021-11-22 DIAGNOSIS — M43.16 SPONDYLOLISTHESIS OF LUMBAR REGION: ICD-10-CM

## 2021-11-22 DIAGNOSIS — M81.0 AGE-RELATED OSTEOPOROSIS WITHOUT CURRENT PATHOLOGICAL FRACTURE: ICD-10-CM

## 2021-11-22 RX ORDER — GABAPENTIN 100 MG/1
100 CAPSULE ORAL 3 TIMES DAILY PRN
Qty: 270 CAPSULE | Refills: 0 | Status: SHIPPED | OUTPATIENT
Start: 2021-11-22 | End: 2022-01-26 | Stop reason: SDUPTHER

## 2021-11-25 LAB
ALBUMIN SERPL-MCNC: 3.9 G/DL (ref 3.6–4.6)
ALBUMIN/GLOB SERPL: 1.5 {RATIO} (ref 1.2–2.2)
ALP SERPL-CCNC: 55 IU/L (ref 44–121)
ALT SERPL-CCNC: 12 IU/L (ref 0–32)
AST SERPL-CCNC: 18 IU/L (ref 0–40)
BASOPHILS # BLD AUTO: 0 X10E3/UL (ref 0–0.2)
BASOPHILS NFR BLD AUTO: 1 %
BILIRUB SERPL-MCNC: 0.5 MG/DL (ref 0–1.2)
BUN SERPL-MCNC: 17 MG/DL (ref 8–27)
BUN/CREAT SERPL: 20 (ref 12–28)
CALCIUM SERPL-MCNC: 8.7 MG/DL (ref 8.7–10.3)
CHLORIDE SERPL-SCNC: 103 MMOL/L (ref 96–106)
CHOLEST SERPL-MCNC: 185 MG/DL (ref 100–199)
CO2 SERPL-SCNC: 23 MMOL/L (ref 20–29)
CREAT SERPL-MCNC: 0.86 MG/DL (ref 0.57–1)
EOSINOPHIL # BLD AUTO: 0.1 X10E3/UL (ref 0–0.4)
EOSINOPHIL NFR BLD AUTO: 3 %
ERYTHROCYTE [DISTWIDTH] IN BLOOD BY AUTOMATED COUNT: 12.6 % (ref 11.7–15.4)
GLOBULIN SER CALC-MCNC: 2.6 G/DL (ref 1.5–4.5)
GLUCOSE SERPL-MCNC: 91 MG/DL (ref 65–99)
HCT VFR BLD AUTO: 39.4 % (ref 34–46.6)
HDLC SERPL-MCNC: 67 MG/DL
HGB BLD-MCNC: 13.2 G/DL (ref 11.1–15.9)
IMM GRANULOCYTES # BLD AUTO: 0 X10E3/UL (ref 0–0.1)
IMM GRANULOCYTES NFR BLD AUTO: 0 %
LDLC SERPL CALC-MCNC: 104 MG/DL (ref 0–99)
LDLC/HDLC SERPL: 1.6 RATIO (ref 0–3.2)
LYMPHOCYTES # BLD AUTO: 0.9 X10E3/UL (ref 0.7–3.1)
LYMPHOCYTES NFR BLD AUTO: 21 %
MCH RBC QN AUTO: 29.6 PG (ref 26.6–33)
MCHC RBC AUTO-ENTMCNC: 33.5 G/DL (ref 31.5–35.7)
MCV RBC AUTO: 88 FL (ref 79–97)
MONOCYTES # BLD AUTO: 0.5 X10E3/UL (ref 0.1–0.9)
MONOCYTES NFR BLD AUTO: 11 %
NEUTROPHILS # BLD AUTO: 2.7 X10E3/UL (ref 1.4–7)
NEUTROPHILS NFR BLD AUTO: 64 %
PLATELET # BLD AUTO: 177 X10E3/UL (ref 150–450)
POTASSIUM SERPL-SCNC: 4.7 MMOL/L (ref 3.5–5.2)
PROT SERPL-MCNC: 6.5 G/DL (ref 6–8.5)
RBC # BLD AUTO: 4.46 X10E6/UL (ref 3.77–5.28)
SODIUM SERPL-SCNC: 139 MMOL/L (ref 134–144)
TRIGL SERPL-MCNC: 75 MG/DL (ref 0–149)
VLDLC SERPL CALC-MCNC: 14 MG/DL (ref 5–40)
WBC # BLD AUTO: 4.3 X10E3/UL (ref 3.4–10.8)

## 2021-12-03 ENCOUNTER — OFFICE VISIT (OUTPATIENT)
Dept: FAMILY MEDICINE CLINIC | Facility: CLINIC | Age: 86
End: 2021-12-03

## 2021-12-03 VITALS
DIASTOLIC BLOOD PRESSURE: 78 MMHG | BODY MASS INDEX: 23.22 KG/M2 | HEART RATE: 75 BPM | OXYGEN SATURATION: 98 % | TEMPERATURE: 98 F | SYSTOLIC BLOOD PRESSURE: 128 MMHG | WEIGHT: 136 LBS | HEIGHT: 64 IN

## 2021-12-03 DIAGNOSIS — I50.42 CHRONIC COMBINED SYSTOLIC AND DIASTOLIC CONGESTIVE HEART FAILURE (HCC): ICD-10-CM

## 2021-12-03 DIAGNOSIS — H35.3231 EXUDATIVE AGE-RELATED MACULAR DEGENERATION, BILATERAL, WITH ACTIVE CHOROIDAL NEOVASCULARIZATION (HCC): ICD-10-CM

## 2021-12-03 DIAGNOSIS — F41.1 GAD (GENERALIZED ANXIETY DISORDER): ICD-10-CM

## 2021-12-03 DIAGNOSIS — Z00.00 MEDICARE ANNUAL WELLNESS VISIT, SUBSEQUENT: Primary | ICD-10-CM

## 2021-12-03 DIAGNOSIS — I70.293 ATHEROSCLEROSIS OF NATIVE ARTERY OF BOTH LOWER EXTREMITIES WITH OTHER CLINICAL MANIFESTATION (HCC): ICD-10-CM

## 2021-12-03 PROCEDURE — G0439 PPPS, SUBSEQ VISIT: HCPCS | Performed by: FAMILY MEDICINE

## 2021-12-03 PROCEDURE — 1159F MED LIST DOCD IN RCRD: CPT | Performed by: FAMILY MEDICINE

## 2021-12-03 PROCEDURE — 99213 OFFICE O/P EST LOW 20 MIN: CPT | Performed by: FAMILY MEDICINE

## 2021-12-03 PROCEDURE — 1170F FXNL STATUS ASSESSED: CPT | Performed by: FAMILY MEDICINE

## 2021-12-03 RX ORDER — SERTRALINE HYDROCHLORIDE 25 MG/1
25 TABLET, FILM COATED ORAL DAILY
Qty: 90 TABLET | Refills: 1 | Status: SHIPPED | OUTPATIENT
Start: 2021-12-03 | End: 2022-02-03

## 2021-12-03 RX ORDER — SERTRALINE HYDROCHLORIDE 25 MG/1
25 TABLET, FILM COATED ORAL DAILY
Qty: 90 TABLET | Refills: 1 | Status: SHIPPED | OUTPATIENT
Start: 2021-12-03 | End: 2021-12-03 | Stop reason: SDUPTHER

## 2021-12-03 NOTE — PROGRESS NOTES
The ABCs of the Annual Wellness Visit  Subsequent Medicare Wellness Visit    Chief Complaint   Patient presents with   • Medicare Wellness-subsequent     SMW - nonfasting - labs 11/24      Subjective    History of Present Illness:  Barbie Hernandez is a 89 y.o. female who presents for a Subsequent Medicare Wellness Visit.  LIZZETTE comes and goes, it seems to manifest as difficulty reading a full sentence.  She will read instructions and get half but overlook half.  For instance when she was reading in the paper about the upcoming [employer] game, she saw the time but not the channel.  She is frustrated with this, that has affected her ability to show up to appointments on time and other factors that are important.  She has never had issues with dementia before.  However she has stopped driving, she feels a decline in her hearing and vision.  This is challenging.  Also lots of social stress with her family, grandson and daughter especially.    The following portions of the patient's history were reviewed and   updated as appropriate: allergies, current medications, past family history, past medical history, past social history, past surgical history and problem list.    Compared to one year ago, the patient feels her physical   health is worse.    Compared to one year ago, the patient feels her mental   health is worse.  She is having issues reading things correctly, she     Recent Hospitalizations:  She was not admitted to the hospital during the last year.       Current Medical Providers:  Patient Care Team:  Tika Machado MD as PCP - General (Family Medicine)  Lucia Pascual MD as Referring Physician (Obstetrics and Gynecology)    Outpatient Medications Prior to Visit   Medication Sig Dispense Refill   • bevacizumab (AVASTIN) 100 MG/4ML chemo injection Infuse  into a venous catheter. Every 6 to 7 weeks only in right eye per patient     • CALCIUM PO Take  by mouth.     • carvedilol (COREG) 3.125 MG tablet Take  3.125 mg by mouth 2 (Two) Times a Day With Meals.     • denosumab (Prolia) 60 MG/ML solution prefilled syringe syringe Inject 60 mg under the skin into the appropriate area as directed 1 (One) Time.     • gabapentin (NEURONTIN) 100 MG capsule Take 1 capsule by mouth 3 (Three) Times a Day As Needed (back pain). 270 capsule 0   • levothyroxine (SYNTHROID, LEVOTHROID) 125 MCG tablet TAKE 1 TABLET EVERY DAY 90 tablet 1   • cephalexin (KEFLEX) 500 MG capsule Take 1 capsule by mouth 3 (Three) Times a Day. 21 capsule 0     No facility-administered medications prior to visit.       No opioid medication identified on active medication list. I have reviewed chart for other potential  high risk medication/s and harmful drug interactions in the elderly.          Aspirin is not on active medication list.  Aspirin use is not indicated based on review of current medical condition/s. Risk of harm outweighs potential benefits.  .    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  ACP discussion was held with the patient during this visit. Patient has an advance directive (not in EMR), copy requested.    Review of Systems   Constitutional: Negative.    HENT: Negative.    Eyes: Negative.    Respiratory: Negative.    Cardiovascular: Negative.    Gastrointestinal: Negative.    Endocrine: Negative.    Genitourinary: Negative.    Musculoskeletal: Negative.    Skin: Negative.    Allergic/Immunologic: Negative.    Neurological: Negative.    Hematological: Negative.    Psychiatric/Behavioral: Negative.         Objective   "  Vitals:    12/03/21 1031   BP: 128/78   Pulse: 75   Temp: 98 °F (36.7 °C)   SpO2: 98%   Weight: 61.7 kg (136 lb)   Height: 162.6 cm (64\")     BMI Readings from Last 1 Encounters:   12/03/21 23.34 kg/m²   BMI is within normal parameters. No follow-up required.    Does the patient have evidence of cognitive impairment? No    Physical Exam  Lab Results   Component Value Date    CHLPL 185 11/24/2021    TRIG 75 11/24/2021    HDL 67 11/24/2021     (H) 11/24/2021    VLDL 14 11/24/2021            HEALTH RISK ASSESSMENT    Smoking Status:  Social History     Tobacco Use   Smoking Status Never Smoker   Smokeless Tobacco Never Used     Alcohol Consumption:  Social History     Substance and Sexual Activity   Alcohol Use No     Fall Risk Screen:    JHONNY Fall Risk Assessment has not been completed.    Depression Screening:  PHQ-2/PHQ-9 Depression Screening 12/3/2021   Little interest or pleasure in doing things 0   Feeling down, depressed, or hopeless 0   Total Score 0       Health Habits and Functional and Cognitive Screening:  Functional & Cognitive Status 12/3/2021   Do you have difficulty preparing food and eating? No   Do you have difficulty bathing yourself, getting dressed or grooming yourself? -   Do you have difficulty using the toilet? Yes   Do you have difficulty moving around from place to place? No   Do you have trouble with steps or getting out of a bed or a chair? Yes   Current Diet Well Balanced Diet   Dental Exam Up to date   Eye Exam Up to date   Exercise (times per week) 7 times per week   Current Exercises Include Walking   Current Exercise Activities Include -   Do you need help using the phone?  No   Are you deaf or do you have serious difficulty hearing?  Yes   Do you need help with transportation? Yes   Do you need help shopping? No   Do you need help preparing meals?  No   Do you need help with housework?  No   Do you need help with laundry? No   Do you need help taking your medications? No "   Do you need help managing money? No   Do you ever drive or ride in a car without wearing a seat belt? No   Have you felt unusual stress, anger or loneliness in the last month? Yes   Who do you live with? Alone   If you need help, do you have trouble finding someone available to you? No   Have you been bothered in the last four weeks by sexual problems? No   Do you have difficulty concentrating, remembering or making decisions? No       Age-appropriate Screening Schedule:  Refer to the list below for future screening recommendations based on patient's age, sex and/or medical conditions. Orders for these recommended tests are listed in the plan section. The patient has been provided with a written plan.    Health Maintenance   Topic Date Due   • TDAP/TD VACCINES (1 - Tdap) Never done   • DXA SCAN  02/19/2022   • INFLUENZA VACCINE  Completed   • LIPID PANEL  Discontinued   • ZOSTER VACCINE  Discontinued              Assessment/Plan   CMS Preventative Services Quick Reference  Risk Factors Identified During Encounter  Dementia/Memory   Depression/Dysphoria  Fall Risk-High or Moderate  Immunizations Discussed/Encouraged (specific Immunizations; Td  Inadequate Social Support, Isolation, Loneliness, Lack of Transportation, Financial Difficulties, or Caregiver Stress   Inactivity/Sedentary  The above risks/problems have been discussed with the patient.  Follow up actions/plans if indicated are seen below in the Assessment/Plan Section.  Pertinent information has been shared with the patient in the After Visit Summary.    Diagnoses and all orders for this visit:    1. Medicare annual wellness visit, subsequent (Primary)    2. Exudative age-related macular degeneration, bilateral, with active choroidal neovascularization (HCC)    3. Chronic combined systolic and diastolic congestive heart failure (HCC)    4. 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 LIZZETTE  (generalized anxiety disorder)  -     Discontinue: sertraline (ZOLOFT) 25 MG tablet; Take 1 tablet by mouth Daily.  Dispense: 90 tablet; Refill: 1  -     sertraline (ZOLOFT) 25 MG tablet; Take 1 tablet by mouth Daily.  Dispense: 90 tablet; Refill: 1    Here for annual exam, fasting labs discussed with patient, immunizations up-to-date, colon cancer screening not indicated, GYN health up-to-date, cardiovascular screening doing well without symptoms, counseled patient to increase vegetable intake, water and 150 minutes of exercise weekly combining weightbearing exercises and aerobic activity.     LIZZETTE not well controlled, discussed starting Zoloft 25 mg daily.  I think this would help some of the focus and inattention she has been struggling with.  It does not sound like dementia but will continue to follow.    Has regular FU with gynecologist and cardiologist.  No abnormalities.  Bone density has been stable with Prolia.  At 89 years old I think it would be appropriate to discuss if this medication is giving her clinical improvement, she feels that it is possibly making her feel sick.  I encouraged her to discuss this with her gynecologist.    Follow Up:   Return in about 8 weeks (around 1/28/2022), or if symptoms worsen or fail to improve, for Recheck LIZZETTE.     An After Visit Summary and PPPS were made available to the patient.    {Optional Chart Navigation Links Wrapup  Review (Popup)  Advance Care Planning  Labs  CC  Problem List  Visit Diagnosis  Medications  Result Review  Imaging  [facility] Maintenance  Quality  BestPractice  SmartSets  SnapShot  Encounters  Notes  Media  Procedures :23}           Medical assistant and I wore mask and eyewear protection during entire encounter.  Patient wore mask.    Tika Machado MD

## 2022-01-21 RX ORDER — LEVOTHYROXINE SODIUM 0.12 MG/1
TABLET ORAL
Qty: 90 TABLET | Refills: 1 | Status: SHIPPED | OUTPATIENT
Start: 2022-01-21 | End: 2022-06-27

## 2022-01-26 DIAGNOSIS — M81.0 AGE-RELATED OSTEOPOROSIS WITHOUT CURRENT PATHOLOGICAL FRACTURE: ICD-10-CM

## 2022-01-26 DIAGNOSIS — M43.16 SPONDYLOLISTHESIS OF LUMBAR REGION: ICD-10-CM

## 2022-01-26 DIAGNOSIS — M54.50 ACUTE RIGHT-SIDED LOW BACK PAIN WITHOUT SCIATICA: ICD-10-CM

## 2022-01-27 RX ORDER — GABAPENTIN 100 MG/1
100 CAPSULE ORAL 3 TIMES DAILY PRN
Qty: 270 CAPSULE | Refills: 0 | Status: SHIPPED | OUTPATIENT
Start: 2022-01-27 | End: 2022-03-18 | Stop reason: SDUPTHER

## 2022-02-03 ENCOUNTER — TELEMEDICINE (OUTPATIENT)
Dept: FAMILY MEDICINE CLINIC | Facility: CLINIC | Age: 87
End: 2022-02-03

## 2022-02-03 DIAGNOSIS — F41.1 GAD (GENERALIZED ANXIETY DISORDER): ICD-10-CM

## 2022-02-03 DIAGNOSIS — M54.50 CHRONIC RIGHT-SIDED LOW BACK PAIN WITHOUT SCIATICA: Primary | ICD-10-CM

## 2022-02-03 DIAGNOSIS — G89.29 CHRONIC RIGHT-SIDED LOW BACK PAIN WITHOUT SCIATICA: Primary | ICD-10-CM

## 2022-02-03 PROCEDURE — 99442 PR PHYS/QHP TELEPHONE EVALUATION 11-20 MIN: CPT | Performed by: FAMILY MEDICINE

## 2022-02-03 NOTE — ASSESSMENT & PLAN NOTE
Pleasant 89-year-old female here for chronic lower back pain.  She has been on gabapentin 100 mg 3 times a day as needed with significant improvement.  No adverse effects to medication.  No drowsiness or falls.  Okay to continue with same dose and follow-up in 3 months.  Brennen reviewed and appropriate.

## 2022-02-03 NOTE — PROGRESS NOTES
Chief Complaint  Anxiety    Subjective          Barbie Hernandez presents to [facility]  History of Present Illness  Kati 89-year-old female here to follow-up anxiety, she was last seen in December for this, started on Zoloft 25 mg daily.  She was hoping it would help with focus and attention.  She decided not to start the zoloft.  She did not receive the script.  Her anxiety has since resolved and she did not end up taking the medicine at all.    Objective   Vital Signs:   There were no vitals taken for this visit.    Physical Exam  Constitutional:       General: She is not in acute distress.     Appearance: She is well-developed.   Pulmonary:      Effort: No respiratory distress.   Psychiatric:         Behavior: Behavior normal.         Thought Content: Thought content normal.         Judgment: Judgment normal.        Result Review :                 Assessment and Plan    Diagnoses and all orders for this visit:    1. Chronic right-sided low back pain without sciatica (Primary)  Assessment & Plan:  Kati 89-year-old female here for chronic lower back pain.  She has been on gabapentin 100 mg 3 times a day as needed with significant improvement.  No adverse effects to medication.  No drowsiness or falls.  Okay to continue with same dose and follow-up in 3 months.  Brennen reviewed and appropriate.      2. LIZZETTE (generalized anxiety disorder)  Comments:  Doing well, never took Zoloft.  Feels that her anxiety has resolved.    I spent 20 minutes caring for Barbie on this date of service. This time includes time spent by me in the following activities:preparing for the visit, performing a medically appropriate examination and/or evaluation , counseling and educating the patient/family/caregiver, documenting information in the medical record and Counseling patient to connect video.  Follow Up   Return in about 3 months (around 5/3/2022), or if symptoms worsen or fail to improve, for  Recheck Lower back pain.  Patient was given instructions and counseling regarding her condition or for health maintenance advice. Please see specific information pulled into the AVS if appropriate.     Tika Machado MD    This was an audio enabled telemedicine encounter. We tried connecting by my chart video, doximety, and face time and no video options worked.

## 2022-03-18 ENCOUNTER — OFFICE VISIT (OUTPATIENT)
Dept: FAMILY MEDICINE CLINIC | Facility: CLINIC | Age: 87
End: 2022-03-18

## 2022-03-18 VITALS
DIASTOLIC BLOOD PRESSURE: 80 MMHG | OXYGEN SATURATION: 98 % | TEMPERATURE: 98.1 F | WEIGHT: 138 LBS | HEART RATE: 65 BPM | SYSTOLIC BLOOD PRESSURE: 128 MMHG | HEIGHT: 64 IN | BODY MASS INDEX: 23.56 KG/M2

## 2022-03-18 DIAGNOSIS — M54.50 CHRONIC RIGHT-SIDED LOW BACK PAIN WITHOUT SCIATICA: Primary | ICD-10-CM

## 2022-03-18 DIAGNOSIS — M43.16 SPONDYLOLISTHESIS OF LUMBAR REGION: ICD-10-CM

## 2022-03-18 DIAGNOSIS — G89.29 CHRONIC RIGHT-SIDED LOW BACK PAIN WITHOUT SCIATICA: Primary | ICD-10-CM

## 2022-03-18 PROCEDURE — 99213 OFFICE O/P EST LOW 20 MIN: CPT | Performed by: FAMILY MEDICINE

## 2022-03-18 RX ORDER — GABAPENTIN 100 MG/1
100 CAPSULE ORAL 3 TIMES DAILY PRN
Qty: 270 CAPSULE | Refills: 0 | Status: SHIPPED | OUTPATIENT
Start: 2022-03-18 | End: 2022-05-27

## 2022-03-18 NOTE — ASSESSMENT & PLAN NOTE
Chronic lower back pain well controlled, she does have right-sided symptoms predominant.  No adverse effects to medication.  Usually her symptoms are controlled on the current regimen but occasionally episodes that are breakthrough.  Brennen reviewed and appropriate.    Plan:  -Continue gabapentin 100 mg 3 times a day as needed  No adverse effects to medication.  -Discussed decreasing some activities, especially vacuuming, doing cleaning items that require bending forward.  This may be exacerbating some of her back pain.  -Follow-up in 3 months.

## 2022-03-18 NOTE — PROGRESS NOTES
"Chief Complaint  Back Pain (Lower back pain right side for a few weeks and sometimes taking   gabapentin  it helps tremendously )    Subjective          Barbie Hernandez presents to [facility]  History of Present Illness  Pleasant 89-year-old female here for chronic lower back pain.  She has been on gabapentin 100 mg 3 times daily with no adverse effects.  She is having some R sided back pain, she is able to bend forward and  items, no pain at night, she is able to wash her face.  The pain does not go down her leg.  She gets the pain randomly and small. She is overall feeling that her quality of life is good and is not wanting to change her treatment regimen but did want to be aware that some time she is getting lower back pain    Objective   Vital Signs:   /80   Pulse 65   Temp 98.1 °F (36.7 °C)   Ht 162.6 cm (64\")   Wt 62.6 kg (138 lb)   SpO2 98%   BMI 23.69 kg/m²     Physical Exam  Vitals and nursing note reviewed.   Constitutional:       General: She is not in acute distress.     Appearance: She is well-developed.   HENT:      Head: Normocephalic.      Nose: Nose normal.   Eyes:      General: No scleral icterus.  Pulmonary:      Effort: Pulmonary effort is normal. No respiratory distress.   Musculoskeletal:         General: Normal range of motion.   Skin:     General: Skin is warm and dry.      Findings: No rash.   Neurological:      Mental Status: She is alert and oriented to person, place, and time.   Psychiatric:         Behavior: Behavior normal.         Thought Content: Thought content normal.         Judgment: Judgment normal.        Result Review :                 Assessment and Plan    Diagnoses and all orders for this visit:    1. Chronic right-sided low back pain without sciatica (Primary)  Assessment & Plan:  Chronic lower back pain well controlled, she does have right-sided symptoms predominant.  No adverse effects to medication.  Usually her " symptoms are controlled on the current regimen but occasionally episodes that are breakthrough.  Brennen reviewed and appropriate.    Plan:  -Continue gabapentin 100 mg 3 times a day as needed  No adverse effects to medication.  -Discussed decreasing some activities, especially vacuuming, doing cleaning items that require bending forward.  This may be exacerbating some of her back pain.  -Follow-up in 3 months.     Orders:  -     gabapentin (NEURONTIN) 100 MG capsule; Take 1 capsule by mouth 3 (Three) Times a Day As Needed (back pain).  Dispense: 270 capsule; Refill: 0    2. Spondylolisthesis of lumbar region  -     gabapentin (NEURONTIN) 100 MG capsule; Take 1 capsule by mouth 3 (Three) Times a Day As Needed (back pain).  Dispense: 270 capsule; Refill: 0      Follow Up   Return in about 3 months (around 6/18/2022), or if symptoms worsen or fail to improve, for Recheck lower back pain.  Patient was given instructions and counseling regarding her condition or for health maintenance advice. Please see specific information pulled into the AVS if appropriate. Medical assistant and I wore mask and eyewear protection during entire encounter.  Patient wore mask.    Tika Machado MD

## 2022-03-24 ENCOUNTER — LAB (OUTPATIENT)
Dept: OTHER | Facility: HOSPITAL | Age: 87
End: 2022-03-24

## 2022-03-24 ENCOUNTER — INFUSION (OUTPATIENT)
Dept: ONCOLOGY | Facility: HOSPITAL | Age: 87
End: 2022-03-24

## 2022-03-24 VITALS
WEIGHT: 138.2 LBS | TEMPERATURE: 97.1 F | RESPIRATION RATE: 16 BRPM | HEIGHT: 64 IN | DIASTOLIC BLOOD PRESSURE: 77 MMHG | BODY MASS INDEX: 23.6 KG/M2 | SYSTOLIC BLOOD PRESSURE: 123 MMHG | OXYGEN SATURATION: 95 % | HEART RATE: 78 BPM

## 2022-03-24 DIAGNOSIS — M81.0 AGE-RELATED OSTEOPOROSIS WITHOUT CURRENT PATHOLOGICAL FRACTURE: ICD-10-CM

## 2022-03-24 DIAGNOSIS — M81.0 AGE-RELATED OSTEOPOROSIS WITHOUT CURRENT PATHOLOGICAL FRACTURE: Primary | ICD-10-CM

## 2022-03-24 LAB
ALBUMIN SERPL-MCNC: 4 G/DL (ref 3.5–5.2)
ALBUMIN/GLOB SERPL: 1.4 G/DL
ALP SERPL-CCNC: 57 U/L (ref 39–117)
ALT SERPL W P-5'-P-CCNC: 11 U/L (ref 1–33)
ANION GAP SERPL CALCULATED.3IONS-SCNC: 11.9 MMOL/L (ref 5–15)
AST SERPL-CCNC: 19 U/L (ref 1–32)
BILIRUB SERPL-MCNC: 0.5 MG/DL (ref 0–1.2)
BUN SERPL-MCNC: 19 MG/DL (ref 8–23)
BUN/CREAT SERPL: 22.1 (ref 7–25)
CALCIUM SPEC-SCNC: 9.5 MG/DL (ref 8.6–10.5)
CHLORIDE SERPL-SCNC: 103 MMOL/L (ref 98–107)
CO2 SERPL-SCNC: 24.1 MMOL/L (ref 22–29)
CREAT SERPL-MCNC: 0.86 MG/DL (ref 0.57–1)
EGFRCR SERPLBLD CKD-EPI 2021: 64.7 ML/MIN/1.73
GLOBULIN UR ELPH-MCNC: 2.9 GM/DL
GLUCOSE SERPL-MCNC: 118 MG/DL (ref 65–99)
MAGNESIUM SERPL-MCNC: 2.1 MG/DL (ref 1.6–2.4)
PHOSPHATE SERPL-MCNC: 3.9 MG/DL (ref 2.5–4.5)
POTASSIUM SERPL-SCNC: 4.2 MMOL/L (ref 3.5–5.2)
PROT SERPL-MCNC: 6.9 G/DL (ref 6–8.5)
SODIUM SERPL-SCNC: 139 MMOL/L (ref 136–145)

## 2022-03-24 PROCEDURE — 80053 COMPREHEN METABOLIC PANEL: CPT | Performed by: OBSTETRICS & GYNECOLOGY

## 2022-03-24 PROCEDURE — 25010000002 DENOSUMAB 60 MG/ML SOLUTION PREFILLED SYRINGE: Performed by: OBSTETRICS & GYNECOLOGY

## 2022-03-24 PROCEDURE — 96372 THER/PROPH/DIAG INJ SC/IM: CPT

## 2022-03-24 PROCEDURE — 84100 ASSAY OF PHOSPHORUS: CPT | Performed by: OBSTETRICS & GYNECOLOGY

## 2022-03-24 PROCEDURE — 83735 ASSAY OF MAGNESIUM: CPT | Performed by: OBSTETRICS & GYNECOLOGY

## 2022-03-24 RX ADMIN — DENOSUMAB 60 MG: 60 INJECTION SUBCUTANEOUS at 14:02

## 2022-03-24 NOTE — NURSING NOTE
Arrived ambulatory  for prolia injection. Indication and side effects reviewd. Denies recent dental work. Labs and medications verified. Prolia administered in left  arm without incidence. Instructed to call prescribing MD for any concerns or questions and instructed on how to schedule future appts.  Pt vu and discharged ambulatory.

## 2022-05-27 DIAGNOSIS — M54.50 CHRONIC RIGHT-SIDED LOW BACK PAIN WITHOUT SCIATICA: ICD-10-CM

## 2022-05-27 DIAGNOSIS — M43.16 SPONDYLOLISTHESIS OF LUMBAR REGION: ICD-10-CM

## 2022-05-27 DIAGNOSIS — G89.29 CHRONIC RIGHT-SIDED LOW BACK PAIN WITHOUT SCIATICA: ICD-10-CM

## 2022-05-27 RX ORDER — GABAPENTIN 100 MG/1
CAPSULE ORAL
Qty: 270 CAPSULE | Refills: 0 | Status: SHIPPED | OUTPATIENT
Start: 2022-05-27 | End: 2022-10-27 | Stop reason: SDUPTHER

## 2022-06-14 ENCOUNTER — APPOINTMENT (OUTPATIENT)
Dept: WOMENS IMAGING | Facility: HOSPITAL | Age: 87
End: 2022-06-14

## 2022-06-14 PROCEDURE — 77067 SCR MAMMO BI INCL CAD: CPT | Performed by: RADIOLOGY

## 2022-06-14 PROCEDURE — 77063 BREAST TOMOSYNTHESIS BI: CPT | Performed by: RADIOLOGY

## 2022-06-27 RX ORDER — LEVOTHYROXINE SODIUM 0.12 MG/1
TABLET ORAL
Qty: 30 TABLET | Refills: 0 | Status: SHIPPED | OUTPATIENT
Start: 2022-06-27 | End: 2022-09-26

## 2022-07-08 ENCOUNTER — OFFICE VISIT (OUTPATIENT)
Dept: FAMILY MEDICINE CLINIC | Facility: CLINIC | Age: 87
End: 2022-07-08

## 2022-07-08 VITALS
HEART RATE: 65 BPM | TEMPERATURE: 97.7 F | OXYGEN SATURATION: 98 % | WEIGHT: 136 LBS | HEIGHT: 64 IN | DIASTOLIC BLOOD PRESSURE: 78 MMHG | BODY MASS INDEX: 23.22 KG/M2 | SYSTOLIC BLOOD PRESSURE: 110 MMHG

## 2022-07-08 DIAGNOSIS — H35.3114 ADVANCED ATROPHIC NONEXUDATIVE AGE-RELATED MACULAR DEGENERATION OF RIGHT EYE WITH SUBFOVEAL INVOLVEMENT: ICD-10-CM

## 2022-07-08 DIAGNOSIS — E03.8 OTHER SPECIFIED HYPOTHYROIDISM: ICD-10-CM

## 2022-07-08 DIAGNOSIS — M43.16 SPONDYLOLISTHESIS OF LUMBAR REGION: Primary | ICD-10-CM

## 2022-07-08 DIAGNOSIS — I50.9 CONGESTIVE HEART FAILURE, UNSPECIFIED HF CHRONICITY, UNSPECIFIED HEART FAILURE TYPE: ICD-10-CM

## 2022-07-08 DIAGNOSIS — M81.0 AGE-RELATED OSTEOPOROSIS WITHOUT CURRENT PATHOLOGICAL FRACTURE: ICD-10-CM

## 2022-07-08 PROCEDURE — 99214 OFFICE O/P EST MOD 30 MIN: CPT | Performed by: FAMILY MEDICINE

## 2022-07-08 NOTE — PROGRESS NOTES
"Chief Complaint  Back Pain (Back pain is doing well   controlled with gabapenting), Hearing Problem ( Went to ent and had waxed removed  they said she will need cochlear implaint would like  have  dr oconnor opinion), and Osteoporosis (PT HAVEN'T HEARD FROM OBG  DOCTOR ABOUT HOW THEY WILL SWITCH FROM PROLIA TO RECLAST )    Subjective        Barbie Hernandez presents to [facility]  History of Present Illness  Pleasant 89-year-old female here for lower back pain that is well-controlled on gabapentin.  She also has chronic hearing loss.  She went to the ear nose and throat specialist to have earwax removed and had hearing testing performed.  They found sensorineural hearing loss, recommending cochlear implant.  Osteoporosis, she has been seeing Dr. Lucia Pascual.  Started Prolia in 2018.  Recommending to transition to Reclast.    CHF stable, EF stable at 46% with pacemaker - Sees Dr Little    Objective   Vital Signs:  /78   Pulse 65   Temp 97.7 °F (36.5 °C)   Ht 162.6 cm (64\")   Wt 61.7 kg (136 lb)   SpO2 98%   BMI 23.34 kg/m²   Estimated body mass index is 23.34 kg/m² as calculated from the following:    Height as of this encounter: 162.6 cm (64\").    Weight as of this encounter: 61.7 kg (136 lb).    BMI is within normal parameters. No other follow-up for BMI required.      Physical Exam  Vitals and nursing note reviewed.   Constitutional:       General: She is not in acute distress.     Appearance: She is well-developed.   HENT:      Head: Normocephalic.      Nose: Nose normal.   Cardiovascular:      Rate and Rhythm: Normal rate and regular rhythm.      Heart sounds: Normal heart sounds. No murmur heard.  Pulmonary:      Effort: Pulmonary effort is normal. No respiratory distress.      Breath sounds: Normal breath sounds.   Musculoskeletal:         General: Normal range of motion.   Skin:     General: Skin is warm and dry.      Findings: No rash.   Neurological:      Mental " Status: She is alert and oriented to person, place, and time.   Psychiatric:         Behavior: Behavior normal.         Thought Content: Thought content normal.         Judgment: Judgment normal.        Result Review :{Labs  Result Review  Imaging  Med Tab  Media  Procedures  :23}                Assessment and Plan   Diagnoses and all orders for this visit:    1. Spondylolisthesis of lumbar region (Primary)    2. Congestive heart failure, unspecified HF chronicity, unspecified heart failure type (HCC)  -     Comprehensive Metabolic Panel; Future  -     CBC & Differential; Future  -     Lipid Panel; Future    3. Age-related osteoporosis without current pathological fracture  -     Vitamin D 25 Hydroxy; Future    4. Advanced atrophic nonexudative age-related macular degeneration of right eye with subfoveal involvement  Comments:  Bilateral, worsening, getting injections every 6 months.     5. Other specified hypothyroidism  -     TSH; Future  -     T4, free; Future    Very pleasant 89-year-old female here for chronic lower back pain that is well controlled on gabapentin 100 mg 3 times a day.  No adverse effects reactions to medication.    In terms of cochlear implant, I am concerned about her recovery from this.  Apparently it takes about 1 year.  Even as she is standing from a seated position it takes longer than 2 seconds.  Due to some of her frailty I would be hesitant to recommend that she proceed forward with this procedure.  However it is up to her as there can be benefits to memory and social interaction/quality of life with improved hearing.    Macular degeneration stable, some slight interval worsening which has changed some of her injections but otherwise she seems to be doing well.    Heart failure stable, EF 46%, pacemaker doing well.  She is seeing her cardiologist regularly.  No edema, maintaining her weight.    I would like to do labs with her appointment in 3 months, I ordered them as above.  At  the end of the visit she stated that she will need transportation by [employer].  Therefore we can do the blood work with her next visit.       Follow Up   Return in about 3 months (around 10/8/2022), or if symptoms worsen or fail to improve, for Recheck lower back pain and thyroid .  Patient was given instructions and counseling regarding her condition or for health maintenance advice. Please see specific information pulled into the AVS if appropriate. Medical assistant and I wore mask and eyewear protection during entire encounter.  Patient wore mask.    Tika Machado MD

## 2022-09-16 ENCOUNTER — TRANSCRIBE ORDERS (OUTPATIENT)
Dept: ADMINISTRATIVE | Facility: HOSPITAL | Age: 87
End: 2022-09-16

## 2022-09-16 DIAGNOSIS — M81.0 SENILE OSTEOPOROSIS: Primary | ICD-10-CM

## 2022-09-26 RX ORDER — ZOLEDRONIC ACID 5 MG/100ML
5 INJECTION, SOLUTION INTRAVENOUS ONCE
Status: CANCELLED | OUTPATIENT
Start: 2022-09-27

## 2022-09-26 RX ORDER — SODIUM CHLORIDE 9 MG/ML
250 INJECTION, SOLUTION INTRAVENOUS ONCE
Status: CANCELLED | OUTPATIENT
Start: 2022-09-27

## 2022-09-26 RX ORDER — LEVOTHYROXINE SODIUM 0.12 MG/1
TABLET ORAL
Qty: 60 TABLET | Refills: 0 | Status: SHIPPED | OUTPATIENT
Start: 2022-09-26 | End: 2022-12-30

## 2022-09-26 NOTE — TELEPHONE ENCOUNTER
Rx Refill Note  Requested Prescriptions     Pending Prescriptions Disp Refills   • levothyroxine (SYNTHROID, LEVOTHROID) 125 MCG tablet [Pharmacy Med Name: LEVOTHYROXINE SODIUM 125 MCG Tablet] 60 tablet 0     Sig: TAKE 1 TABLET EVERY DAY      Last office visit with prescribing clinician: Visit date not found      Next office visit with prescribing clinician: Visit date not found            Buzz Esqueda MA  09/26/22, 07:59 EDT

## 2022-09-27 ENCOUNTER — APPOINTMENT (OUTPATIENT)
Dept: ONCOLOGY | Facility: HOSPITAL | Age: 87
End: 2022-09-27

## 2022-09-27 ENCOUNTER — HOSPITAL ENCOUNTER (OUTPATIENT)
Dept: INFUSION THERAPY | Facility: HOSPITAL | Age: 87
Discharge: HOME | End: 2022-09-27
Admitting: FAMILY MEDICINE

## 2022-09-27 VITALS
TEMPERATURE: 96.9 F | OXYGEN SATURATION: 98 % | HEART RATE: 91 BPM | DIASTOLIC BLOOD PRESSURE: 83 MMHG | WEIGHT: 140 LBS | BODY MASS INDEX: 24.03 KG/M2 | SYSTOLIC BLOOD PRESSURE: 139 MMHG | RESPIRATION RATE: 16 BRPM

## 2022-09-27 DIAGNOSIS — M81.0 AGE-RELATED OSTEOPOROSIS WITHOUT CURRENT PATHOLOGICAL FRACTURE: Primary | ICD-10-CM

## 2022-09-27 LAB
ANION GAP SERPL CALCULATED.3IONS-SCNC: 9.3 MMOL/L (ref 5–15)
BUN SERPL-MCNC: 17 MG/DL (ref 8–23)
BUN/CREAT SERPL: 20.7 (ref 7–25)
CALCIUM SPEC-SCNC: 9.1 MG/DL (ref 8.6–10.5)
CHLORIDE SERPL-SCNC: 104 MMOL/L (ref 98–107)
CO2 SERPL-SCNC: 26.7 MMOL/L (ref 22–29)
CREAT SERPL-MCNC: 0.82 MG/DL (ref 0.57–1)
EGFRCR SERPLBLD CKD-EPI 2021: 68.5 ML/MIN/1.73
GLUCOSE SERPL-MCNC: 87 MG/DL (ref 65–99)
POTASSIUM SERPL-SCNC: 4.2 MMOL/L (ref 3.5–5.2)
SODIUM SERPL-SCNC: 140 MMOL/L (ref 136–145)

## 2022-09-27 PROCEDURE — 25010000002 ZOLEDRONIC ACID 5 MG/100ML SOLUTION: Performed by: FAMILY MEDICINE

## 2022-09-27 PROCEDURE — 80048 BASIC METABOLIC PNL TOTAL CA: CPT | Performed by: OBSTETRICS & GYNECOLOGY

## 2022-09-27 PROCEDURE — 36415 COLL VENOUS BLD VENIPUNCTURE: CPT

## 2022-09-27 PROCEDURE — 96365 THER/PROPH/DIAG IV INF INIT: CPT

## 2022-09-27 RX ORDER — ZOLEDRONIC ACID 5 MG/100ML
5 INJECTION, SOLUTION INTRAVENOUS ONCE
Status: CANCELLED | OUTPATIENT
Start: 2023-09-27

## 2022-09-27 RX ORDER — ZOLEDRONIC ACID 5 MG/100ML
5 INJECTION, SOLUTION INTRAVENOUS ONCE
Status: COMPLETED | OUTPATIENT
Start: 2022-09-27 | End: 2022-09-27

## 2022-09-27 RX ORDER — SODIUM CHLORIDE 9 MG/ML
250 INJECTION, SOLUTION INTRAVENOUS ONCE
Status: DISCONTINUED | OUTPATIENT
Start: 2022-09-27 | End: 2022-09-29 | Stop reason: HOSPADM

## 2022-09-27 RX ORDER — SODIUM CHLORIDE 9 MG/ML
250 INJECTION, SOLUTION INTRAVENOUS ONCE
OUTPATIENT
Start: 2023-09-27

## 2022-09-27 RX ADMIN — ZOLEDRONIC ACID 5 MG: 0.05 INJECTION, SOLUTION INTRAVENOUS at 14:29

## 2022-10-27 ENCOUNTER — OFFICE VISIT (OUTPATIENT)
Dept: FAMILY MEDICINE CLINIC | Facility: CLINIC | Age: 87
End: 2022-10-27

## 2022-10-27 VITALS
HEIGHT: 64 IN | DIASTOLIC BLOOD PRESSURE: 78 MMHG | SYSTOLIC BLOOD PRESSURE: 114 MMHG | OXYGEN SATURATION: 99 % | HEART RATE: 73 BPM | TEMPERATURE: 97.9 F | WEIGHT: 136 LBS | BODY MASS INDEX: 23.22 KG/M2

## 2022-10-27 DIAGNOSIS — M81.0 AGE-RELATED OSTEOPOROSIS WITHOUT CURRENT PATHOLOGICAL FRACTURE: ICD-10-CM

## 2022-10-27 DIAGNOSIS — E03.8 OTHER SPECIFIED HYPOTHYROIDISM: Primary | ICD-10-CM

## 2022-10-27 DIAGNOSIS — Z51.81 THERAPEUTIC DRUG MONITORING: ICD-10-CM

## 2022-10-27 DIAGNOSIS — M54.50 CHRONIC RIGHT-SIDED LOW BACK PAIN WITHOUT SCIATICA: ICD-10-CM

## 2022-10-27 DIAGNOSIS — I70.293 ATHEROSCLEROSIS OF NATIVE ARTERY OF BOTH LOWER EXTREMITIES WITH OTHER CLINICAL MANIFESTATION: ICD-10-CM

## 2022-10-27 DIAGNOSIS — M43.16 SPONDYLOLISTHESIS OF LUMBAR REGION: ICD-10-CM

## 2022-10-27 DIAGNOSIS — G89.29 CHRONIC RIGHT-SIDED LOW BACK PAIN WITHOUT SCIATICA: ICD-10-CM

## 2022-10-27 PROCEDURE — 99214 OFFICE O/P EST MOD 30 MIN: CPT | Performed by: FAMILY MEDICINE

## 2022-10-27 RX ORDER — GABAPENTIN 100 MG/1
100 CAPSULE ORAL 4 TIMES DAILY
Qty: 360 CAPSULE | Refills: 0 | Status: SHIPPED | OUTPATIENT
Start: 2022-10-27 | End: 2023-02-02

## 2022-10-27 NOTE — PROGRESS NOTES
"Chief Complaint  Back Pain (Lower back pain and  goes down to right leg not doing to well   it comes and goes  had to take an extra ibuprofen with the gabapentin ) and Hypothyroidism (Follow up  no complains )    Subjective        Barbie Hernandez presents to [facility]  History of Present Illness  Pleasant 89-year-old female here to follow-up for chronic lower back pain due to spondylolisthesis that is overall stable.  She has been doing well on gabapentin 100 mg 3 times daily.  She has seen pain management.  Wondering if she may need an epidural soon.  We will follow-up with them for further evaluation if necessary.    Hypothyroidism, currently on levothyroxine 125 mcg daily.  Thankfully asymptomatic.  Due for labs today.    We discussed weakness.  This has been a chronic problem.  She declined physical therapy today.  She does try to do exercises at home with weights but is careful to not trigger her back pain if she is having an episode during that day.      Objective   Vital Signs:  /78   Pulse 73   Temp 97.9 °F (36.6 °C)   Ht 162.6 cm (64\")   Wt 61.7 kg (136 lb)   SpO2 99%   BMI 23.34 kg/m²   Estimated body mass index is 23.34 kg/m² as calculated from the following:    Height as of this encounter: 162.6 cm (64\").    Weight as of this encounter: 61.7 kg (136 lb).    BMI is within normal parameters. No other follow-up for BMI required.      Physical Exam  Vitals and nursing note reviewed.   Constitutional:       General: She is not in acute distress.     Appearance: She is well-developed.   HENT:      Head: Normocephalic.      Nose: Nose normal.   Cardiovascular:      Rate and Rhythm: Normal rate and regular rhythm.      Heart sounds: Normal heart sounds. No murmur heard.  Pulmonary:      Effort: Pulmonary effort is normal. No respiratory distress.      Breath sounds: Normal breath sounds.   Musculoskeletal:         General: Normal range of motion.   Skin:     General: " Skin is warm and dry.      Findings: No rash.   Neurological:      Mental Status: She is alert and oriented to person, place, and time.   Psychiatric:         Behavior: Behavior normal.         Thought Content: Thought content normal.         Judgment: Judgment normal.        Result Review :                Assessment and Plan   Diagnoses and all orders for this visit:    1. Other specified hypothyroidism (Primary)  -     TSH  -     T4, free    2. Spondylolisthesis of lumbar region  -     gabapentin (NEURONTIN) 100 MG capsule; Take 1 capsule by mouth 4 (Four) Times a Day.  Dispense: 360 capsule; Refill: 0    3. Chronic right-sided low back pain without sciatica  -     gabapentin (NEURONTIN) 100 MG capsule; Take 1 capsule by mouth 4 (Four) Times a Day.  Dispense: 360 capsule; Refill: 0    4. Age-related osteoporosis without current pathological fracture  -     Vitamin D,25-Hydroxy    5. Atherosclerosis of native artery of both lower extremities with other clinical manifestation (HCC)  -     CBC & Differential  -     Lipid Panel    6. Therapeutic drug monitoring  -     ToxASSURE Select 13 (MW) - Urine, Clean Catch    Very pleasant 89-year-old female here to follow-up for lumbar radiculopathy due to spondylolisthesis.  Thankfully she is doing adequately well on gabapentin and episodic epidurals.  She feels that she may need another epidural soon.  We will start with increasing her gabapentin from 100 mg 3 times daily to 4 times daily to see if this improves her symptoms.  If not I would recommend that she follows up with pain management for further evaluation.    Hypothyroidism well-controlled on levothyroxine 125 mcg daily.  Plan to continue same and recheck levels today.    Due to follow-up CBC and lipids with history of calcification of the arteries.  She did have a BMP with her GYN, scanned to chart.    The patient has read and signed the [facility] Controlled Substance Contract.  I will continue to see patient  for regular follow up appointments.  They are well controlled on their medication.  SHELIA has been reviewed by me and is updated every 3 months. The patient is aware of the potential for addiction and dependence.         Follow Up   Return in about 3 months (around 1/27/2023), or if symptoms worsen or fail to improve, for Recheck lower back pain.  Patient was given instructions and counseling regarding her condition or for health maintenance advice. Please see specific information pulled into the AVS if appropriate. Medical assistant and I wore mask and eyewear protection during entire encounter.  Patient wore mask.    Tika Machado MD

## 2022-11-03 ENCOUNTER — TELEPHONE (OUTPATIENT)
Dept: FAMILY MEDICINE CLINIC | Facility: CLINIC | Age: 87
End: 2022-11-03

## 2022-11-04 LAB
25(OH)D3+25(OH)D2 SERPL-MCNC: 53.8 NG/ML (ref 30–100)
BASOPHILS # BLD AUTO: 0.04 10*3/MM3 (ref 0–0.2)
BASOPHILS NFR BLD AUTO: 0.8 % (ref 0–1.5)
CHOLEST SERPL-MCNC: 188 MG/DL (ref 0–200)
DRUGS UR: NORMAL
EOSINOPHIL # BLD AUTO: 0.1 10*3/MM3 (ref 0–0.4)
EOSINOPHIL NFR BLD AUTO: 2.1 % (ref 0.3–6.2)
ERYTHROCYTE [DISTWIDTH] IN BLOOD BY AUTOMATED COUNT: 13.1 % (ref 12.3–15.4)
HCT VFR BLD AUTO: 41.2 % (ref 34–46.6)
HDLC SERPL-MCNC: 73 MG/DL (ref 40–60)
HGB BLD-MCNC: 13.7 G/DL (ref 12–15.9)
IMM GRANULOCYTES # BLD AUTO: 0 10*3/MM3 (ref 0–0.05)
IMM GRANULOCYTES NFR BLD AUTO: 0 % (ref 0–0.5)
LDLC SERPL CALC-MCNC: 102 MG/DL (ref 0–100)
LYMPHOCYTES # BLD AUTO: 0.94 10*3/MM3 (ref 0.7–3.1)
LYMPHOCYTES NFR BLD AUTO: 20 % (ref 19.6–45.3)
MCH RBC QN AUTO: 30 PG (ref 26.6–33)
MCHC RBC AUTO-ENTMCNC: 33.3 G/DL (ref 31.5–35.7)
MCV RBC AUTO: 90.4 FL (ref 79–97)
MONOCYTES # BLD AUTO: 0.63 10*3/MM3 (ref 0.1–0.9)
MONOCYTES NFR BLD AUTO: 13.4 % (ref 5–12)
NEUTROPHILS # BLD AUTO: 3 10*3/MM3 (ref 1.7–7)
NEUTROPHILS NFR BLD AUTO: 63.7 % (ref 42.7–76)
NRBC BLD AUTO-RTO: 0 /100 WBC (ref 0–0.2)
PLATELET # BLD AUTO: 170 10*3/MM3 (ref 140–450)
RBC # BLD AUTO: 4.56 10*6/MM3 (ref 3.77–5.28)
T4 FREE SERPL-MCNC: 1.58 NG/DL (ref 0.93–1.7)
TRIGL SERPL-MCNC: 73 MG/DL (ref 0–150)
TSH SERPL DL<=0.005 MIU/L-ACNC: 0.9 UIU/ML (ref 0.27–4.2)
VLDLC SERPL CALC-MCNC: 13 MG/DL (ref 5–40)
WBC # BLD AUTO: 4.71 10*3/MM3 (ref 3.4–10.8)

## 2022-11-29 ENCOUNTER — OFFICE VISIT (OUTPATIENT)
Dept: ORTHOPEDIC SURGERY | Facility: CLINIC | Age: 87
End: 2022-11-29

## 2022-11-29 VITALS — WEIGHT: 136.1 LBS | BODY MASS INDEX: 23.23 KG/M2 | TEMPERATURE: 97.8 F | HEIGHT: 64 IN

## 2022-11-29 DIAGNOSIS — M43.16 SPONDYLOLISTHESIS OF LUMBAR REGION: ICD-10-CM

## 2022-11-29 DIAGNOSIS — M54.50 LUMBAR PAIN: Primary | ICD-10-CM

## 2022-11-29 PROCEDURE — 99213 OFFICE O/P EST LOW 20 MIN: CPT | Performed by: ORTHOPAEDIC SURGERY

## 2022-11-29 PROCEDURE — 72100 X-RAY EXAM L-S SPINE 2/3 VWS: CPT | Performed by: ORTHOPAEDIC SURGERY

## 2022-11-29 RX ORDER — VALSARTAN 40 MG/1
TABLET ORAL
COMMUNITY
Start: 2022-11-02

## 2022-11-29 NOTE — PATIENT INSTRUCTIONS
Access Code: [email or web address]   URL: [email or web address]   Date: 05/10/2018   Prepared by: David Guadarrama     Exercises  Seated Shoulder Flexion AAROM with Pulley Behind - 30 reps - 2 sets - 2 hold - 1x daily - 5x weekly  Seated Shoulder Scaption AAROM with Pulley at Side - 30 reps - 2 sets - 2 hold - 1x daily - 5x weekly  Seated Shoulder Abduction AAROM with Pulley Behind - 30 reps - 2 sets - 2 hold - 1x daily - 5x weekly  Shoulder Flexion Wall Slide with Towel - 10 reps - 3 sets - 2 hold - 1x daily - 5x weekly  Standing Shoulder Row with Anchored Resistance - 12 reps - 2 sets - 2 hold - 1x daily - 5x weekly  Shoulder Extension with Resistance - 15 reps - 2 sets - 2 hold - 1x daily - 5x weekly   Infliximab Counseling:  I discussed with the patient the risks of infliximab including but not limited to myelosuppression, immunosuppression, autoimmune hepatitis, demyelinating diseases, lymphoma, and serious infections.  The patient understands that monitoring is required including a PPD at baseline and must alert us or the primary physician if symptoms of infection or other concerning signs are noted.

## 2022-11-30 NOTE — PROGRESS NOTES
She has continued back and radiating leg pain.  Did well with epidurals previously.  Good strength in the legs on examination.  X-rays show a large well-balanced scoliosis to the right in the mid lumbar spine no change from prior exam.  A CT scan from 2021 was reviewed under bone windows and soft tissue windows of the spine and she does have at least some mild to moderate stenosis at the 3445 area but nothing terrible.  Of course there may be dynamic changes due to the scoliosis.  Still she is 9 he does not want surgery and is getting along nicely without it.  I am not going to order another diagnostic test but rather we will go ahead and order epidural injections which she has had before with success.  I will see her back.

## 2022-12-12 ENCOUNTER — HOSPITAL ENCOUNTER (OUTPATIENT)
Dept: GENERAL RADIOLOGY | Facility: HOSPITAL | Age: 87
Discharge: HOME | End: 2022-12-12

## 2022-12-12 ENCOUNTER — ANESTHESIA (OUTPATIENT)
Dept: PAIN MEDICINE | Facility: HOSPITAL | Age: 87
End: 2022-12-12

## 2022-12-12 ENCOUNTER — ANESTHESIA EVENT (OUTPATIENT)
Dept: PAIN MEDICINE | Facility: HOSPITAL | Age: 87
End: 2022-12-12

## 2022-12-12 ENCOUNTER — HOSPITAL ENCOUNTER (OUTPATIENT)
Dept: PAIN MEDICINE | Facility: HOSPITAL | Age: 87
Discharge: HOME | End: 2022-12-12

## 2022-12-12 VITALS
DIASTOLIC BLOOD PRESSURE: 68 MMHG | OXYGEN SATURATION: 97 % | RESPIRATION RATE: 20 BRPM | HEART RATE: 60 BPM | SYSTOLIC BLOOD PRESSURE: 120 MMHG | TEMPERATURE: 98 F

## 2022-12-12 DIAGNOSIS — M54.50 PAIN, LUMBAR REGION: ICD-10-CM

## 2022-12-12 DIAGNOSIS — M43.16 SPONDYLOLISTHESIS OF LUMBAR REGION: Primary | ICD-10-CM

## 2022-12-12 PROCEDURE — 77003 FLUOROGUIDE FOR SPINE INJECT: CPT

## 2022-12-12 PROCEDURE — 25010000002 METHYLPREDNISOLONE PER 80 MG: Performed by: ANESTHESIOLOGY

## 2022-12-12 PROCEDURE — 0 IOPAMIDOL 41 % SOLUTION: Performed by: ANESTHESIOLOGY

## 2022-12-12 RX ORDER — LIDOCAINE HYDROCHLORIDE 10 MG/ML
1 INJECTION, SOLUTION INFILTRATION; PERINEURAL ONCE AS NEEDED
Status: DISCONTINUED | OUTPATIENT
Start: 2022-12-12 | End: 2022-12-13 | Stop reason: HOSPADM

## 2022-12-12 RX ORDER — FENTANYL CITRATE 50 UG/ML
50 INJECTION, SOLUTION INTRAMUSCULAR; INTRAVENOUS AS NEEDED
Status: DISCONTINUED | OUTPATIENT
Start: 2022-12-12 | End: 2022-12-13 | Stop reason: HOSPADM

## 2022-12-12 RX ORDER — SODIUM CHLORIDE 0.9 % (FLUSH) 0.9 %
1-10 SYRINGE (ML) INJECTION AS NEEDED
Status: DISCONTINUED | OUTPATIENT
Start: 2022-12-12 | End: 2022-12-13 | Stop reason: HOSPADM

## 2022-12-12 RX ORDER — MIDAZOLAM HYDROCHLORIDE 1 MG/ML
1 INJECTION INTRAMUSCULAR; INTRAVENOUS AS NEEDED
Status: DISCONTINUED | OUTPATIENT
Start: 2022-12-12 | End: 2022-12-13 | Stop reason: HOSPADM

## 2022-12-12 RX ORDER — METHYLPREDNISOLONE ACETATE 80 MG/ML
80 INJECTION, SUSPENSION INTRA-ARTICULAR; INTRALESIONAL; INTRAMUSCULAR; SOFT TISSUE ONCE
Status: COMPLETED | OUTPATIENT
Start: 2022-12-12 | End: 2022-12-12

## 2022-12-12 RX ADMIN — IOPAMIDOL 10 ML: 408 INJECTION, SOLUTION INTRATHECAL at 13:20

## 2022-12-12 RX ADMIN — METHYLPREDNISOLONE ACETATE 80 MG: 80 INJECTION, SUSPENSION INTRA-ARTICULAR; INTRALESIONAL; INTRAMUSCULAR; SOFT TISSUE at 13:20

## 2022-12-12 NOTE — H&P
INTERVAL HISTORY:    She was in our clinic last year for a series of epidurals which gave her excellent relief.  She now returns with right-sided low lumbar pain that radiates into her right thigh.  8 out of 10.  Intermittent timing.  Occurring randomly.  But relieved by relaxation and lying down.  It does affect her sleep her housekeeping and her walking.  For over 6 weeks she is tried rest gabapentin without relief  Current Outpatient Medications on File Prior to Encounter   Medication Sig Dispense Refill   • bevacizumab (AVASTIN) 100 MG/4ML chemo injection Infuse  into a venous catheter. Every 6 to 7 weeks only in right eye per patient     • Calcipotriene-Betameth Diprop 0.005-0.064 % cream Apply 1 application topically Every Night.     • CALCIUM PO Take  by mouth.     • carvedilol (COREG) 3.125 MG tablet Take 3.125 mg by mouth 2 (Two) Times a Day With Meals.     • gabapentin (NEURONTIN) 100 MG capsule Take 1 capsule by mouth 4 (Four) Times a Day. 360 capsule 0   • Infant Care Products (CERAVE BABY MOISTRIZING CREAM EX) Apply  topically. For dry hands     • levothyroxine (SYNTHROID, LEVOTHROID) 125 MCG tablet TAKE 1 TABLET EVERY DAY 60 tablet 0   • valsartan (DIOVAN) 40 MG tablet      • Zoledronic Acid (RECLAST IV) Infuse  into a venous catheter. Yearly       No current facility-administered medications on file prior to encounter.       Past Medical History:   Diagnosis Date   • A-fib (HCC)    • Arthritis    • Atrial fibrillation (HCC)    • Cancer (HCC)    • Coronary artery disease    • Disease of thyroid gland    • Hyperlipidemia    • Low back pain    • Macular degeneration    • Osteoporosis        No hematologic infectious or constitutional symptoms  Negative screen for CAITIE      Exam:  /64 (BP Location: Left arm, Patient Position: Lying)   Pulse 70   Temp 36.7 °C (98 °F) (Infrared)   Resp 16   LMP  (LMP Unknown) Comment: menopause  SpO2 99%   Airway Mallampatti 2  Alert and  oriented      Diagnosis:  Post-Op Diagnosis Codes:     * Lumbar radiculopathy [M54.16]    Plan:  Lumbar 5   epidural steroid injection under fluoroscopic guidance    I have encouraged them to continue:  1.  Physical therapy exercises at home as prescribed by physical therapy or from the pain clinic handout.  Continuation of these exercises every day, or multiple times per week, even when the patient has good pain relief, was stressed to the patient as a preventative measure to decrease the frequency and severity of future pain episodes.  2.  Continue pain medicines as already prescribed.  If patient not currently taking any, it is recommended to begin Acetaminophen 1000 mg po q 8 hours.  If other medicines containing Acetaminophen are currently prescribed, maintain daily dose at 3000mg.    3.  If they can tolerate NSAIDS, it is recommended to take Ibuprofen 600 mg po q 6 hours for 7 days during pain exacerbations.   Alternatively, they may substitute an NSAID of their choice (e.g. Aleve)  4.  Heat and ice to the affected area as tolerated for pain control.   5.  Low impact exercise such as walking or water exercise was recommended to maintain overall health and aid in weight control.   6.  Follow up as needed for subsequent injections.

## 2022-12-12 NOTE — ANESTHESIA PROCEDURE NOTES
PAIN Epidural block      Patient reassessed immediately prior to procedure    Patient location during procedure: pain clinic  Indication:procedure for pain  Performed By  Anesthesiologist: Guillaume Roberson MD  Preanesthetic Checklist  Completed: patient identified and risks and benefits discussed  Additional Notes  Diagnosis:     Post-Op Diagnosis Codes:     * Lumbar radiculopathy (M54.16)    Sedation:  none    A lumbar epidural steroid injection with fluoroscopic guidance and an Isovue dye epidurogram was performed.  Under fluoroscopic guidance, the epidural space was identified and accessed, confirmed by loss of resistance to saline followed by injection of Isovue dye, which shows a good epidurogram.  The above medications were injected uneventfully.    Prep:  Pt Position:prone  Sterile Tech:cap, gloves, mask and sterile barrier  Prep:chlorhexidine gluconate and isopropyl alcohol  Monitoring:blood pressure monitoring, continuous pulse oximetry and EKG  Procedure:Sedation: no     Approach:right paramedian  Guidance: fluoroscopy  Location:lumbar  Interspace: L5-S1.  Needle Type:Tuohy  Needle Gauge:20  Aspiration:negative  Medications:  Preservative Free Saline:1mL  Isovue:1mL  Comments:Isovue dye reveals good epidurogram  Depomedrol 80 mg  Post Assessment:  Pt Tolerance:patient tolerated the procedure well with no apparent complications  Complications:no

## 2022-12-30 RX ORDER — LEVOTHYROXINE SODIUM 0.12 MG/1
TABLET ORAL
Qty: 90 TABLET | Refills: 1 | Status: SHIPPED | OUTPATIENT
Start: 2022-12-30 | End: 2023-03-02 | Stop reason: SDUPTHER

## 2023-02-02 ENCOUNTER — OFFICE VISIT (OUTPATIENT)
Dept: FAMILY MEDICINE CLINIC | Facility: CLINIC | Age: 88
End: 2023-02-02
Payer: MEDICARE

## 2023-02-02 VITALS
SYSTOLIC BLOOD PRESSURE: 124 MMHG | TEMPERATURE: 97.8 F | DIASTOLIC BLOOD PRESSURE: 76 MMHG | WEIGHT: 139 LBS | HEIGHT: 64 IN | BODY MASS INDEX: 23.73 KG/M2

## 2023-02-02 DIAGNOSIS — G89.29 CHRONIC RIGHT-SIDED LOW BACK PAIN WITHOUT SCIATICA: ICD-10-CM

## 2023-02-02 DIAGNOSIS — M43.16 SPONDYLOLISTHESIS OF LUMBAR REGION: Primary | ICD-10-CM

## 2023-02-02 DIAGNOSIS — M54.50 CHRONIC RIGHT-SIDED LOW BACK PAIN WITHOUT SCIATICA: ICD-10-CM

## 2023-02-02 PROCEDURE — 99213 OFFICE O/P EST LOW 20 MIN: CPT | Performed by: FAMILY MEDICINE

## 2023-02-02 RX ORDER — MELOXICAM 7.5 MG/1
7.5 TABLET ORAL DAILY PRN
Qty: 90 TABLET | Refills: 2 | Status: SHIPPED | OUTPATIENT
Start: 2023-02-02 | End: 2023-02-13 | Stop reason: SDUPTHER

## 2023-02-02 NOTE — PROGRESS NOTES
"Chief Complaint  Back Pain (Still has some issues pain comes and goes ,medications seems not to help   and  would like to talk about this ,epidural shot last for a week  want to ask if meloxican can be an option for back pain )    Subjective        Barbie Hernandez presents to [facility]  History of Present Illness  Pleasant 89-year-old female here for lower back pain.  This has been a chronic problem she has responded well to epidural for the first week earlier this month but seems to have worn off, on gabapentin currently which she does not feel is helpful. S he has a lot of pain with bending forward.  She has a reative with osteoparosis and had help with meloxicm.     Objective   Vital Signs:  /76   Temp 97.8 °F (36.6 °C)   Ht 162.6 cm (64\")   Wt 63 kg (139 lb)   BMI 23.86 kg/m²   Estimated body mass index is 23.86 kg/m² as calculated from the following:    Height as of this encounter: 162.6 cm (64\").    Weight as of this encounter: 63 kg (139 lb).       BMI is within normal parameters. No other follow-up for BMI required.      Physical Exam  Vitals and nursing note reviewed.   Constitutional:       General: She is not in acute distress.     Appearance: She is well-developed.   HENT:      Head: Normocephalic.      Nose: Nose normal.   Cardiovascular:      Rate and Rhythm: Normal rate and regular rhythm.      Heart sounds: Normal heart sounds. No murmur heard.  Pulmonary:      Effort: Pulmonary effort is normal. No respiratory distress.      Breath sounds: Normal breath sounds.   Musculoskeletal:         General: Normal range of motion.   Skin:     General: Skin is warm and dry.      Findings: No rash.   Neurological:      Mental Status: She is alert and oriented to person, place, and time.   Psychiatric:         Behavior: Behavior normal.         Thought Content: Thought content normal.         Judgment: Judgment normal.        Result Review :                   Assessment " and Plan   Diagnoses and all orders for this visit:    1. Spondylolisthesis of lumbar region (Primary)    2. Chronic right-sided low back pain without sciatica  -     meloxicam (Mobic) 7.5 MG tablet; Take 1 tablet by mouth Daily As Needed for Moderate Pain. Back pain  Dispense: 90 tablet; Refill: 2    Lower back pain not well controlled, she has had minimal improvement with epidurals.  Gabapentin has not been helpful as well.  We discussed various treatments, she is requesting a trial of meloxicam.  As her renal function is excellent I am glad to do a lower dose as needed.  If she notices that the gabapentin was helpful we can restart gabapentin 100 mg 3 times daily as needed.,  Continue follow-up with pain management.         Follow Up   Return in about 3 months (around 5/2/2023), or if symptoms worsen or fail to improve, for Recheck Back pain .  Patient was given instructions and counseling regarding her condition or for health maintenance advice. Please see specific information pulled into the AVS if appropriate. Medical assistant and I wore mask and eyewear protection during entire encounter.  Patient wore mask.    Tika Machado MD

## 2023-02-13 DIAGNOSIS — G89.29 CHRONIC RIGHT-SIDED LOW BACK PAIN WITHOUT SCIATICA: ICD-10-CM

## 2023-02-13 DIAGNOSIS — M54.50 CHRONIC RIGHT-SIDED LOW BACK PAIN WITHOUT SCIATICA: ICD-10-CM

## 2023-02-13 RX ORDER — MELOXICAM 7.5 MG/1
7.5 TABLET ORAL DAILY PRN
Qty: 90 TABLET | Refills: 2 | Status: SHIPPED | OUTPATIENT
Start: 2023-02-13

## 2023-02-13 NOTE — TELEPHONE ENCOUNTER
Caller: Saeid Hernandezgisel HELTON    Relationship: Self    Best call back number: [phone]    Requested Prescriptions:   Requested Prescriptions     Pending Prescriptions Disp Refills   • meloxicam (Mobic) 7.5 MG tablet 90 tablet 2     Sig: Take 1 tablet by mouth Daily As Needed for Moderate Pain. Back pain        Pharmacy where request should be sent: [facility] - [facility] [address] [facility] [phone] [facility] [phone] FX     Additional details provided by patient: PATIENT STATES SHE HAS NOT RECEIVED THIS PRESCRIPTION FROM [facility].     Does the patient have less than a 3 day supply:  [x] Yes  [] No    Would you like a call back once the refill request has been completed: [x] Yes [] No    If the office needs to give you a call back, can they leave a voicemail: [x] Yes [] No    Avani Lin Rep   02/13/23 09:18 EST

## 2023-03-01 ENCOUNTER — TELEPHONE (OUTPATIENT)
Dept: FAMILY MEDICINE CLINIC | Facility: CLINIC | Age: 88
End: 2023-03-01
Payer: MEDICARE

## 2023-03-01 DIAGNOSIS — G89.29 CHRONIC RIGHT-SIDED LOW BACK PAIN WITHOUT SCIATICA: Primary | ICD-10-CM

## 2023-03-01 DIAGNOSIS — M54.50 CHRONIC RIGHT-SIDED LOW BACK PAIN WITHOUT SCIATICA: Primary | ICD-10-CM

## 2023-03-01 NOTE — TELEPHONE ENCOUNTER
Caller: Barbie Hernandez    Relationship to patient: Self    Best call back number: [phone]    Patient is needing: PATIENT IS REQUESTING TO SPEAK TO DR. HAYS'S MA REGARDING THE PAIN MEDICATION SHE WAS GIVEN RECENTLY. SHE STATES THAT IT IS NOT GIVING HER ANY RELIEF.

## 2023-03-02 RX ORDER — GABAPENTIN 100 MG/1
100 CAPSULE ORAL 3 TIMES DAILY
Qty: 90 CAPSULE | Refills: 2 | Status: SHIPPED | OUTPATIENT
Start: 2023-03-02 | End: 2023-03-16 | Stop reason: SDUPTHER

## 2023-03-02 RX ORDER — LEVOTHYROXINE SODIUM 0.12 MG/1
125 TABLET ORAL DAILY
Qty: 90 TABLET | Refills: 1 | Status: SHIPPED | OUTPATIENT
Start: 2023-03-02

## 2023-03-02 NOTE — TELEPHONE ENCOUNTER
I am happy to approve the gabapentin again, I prescribed it 1 tablet 3 times a day with some refills.  Please let me know how this transition goes if there is any further changes needed before I see her in May.

## 2023-03-02 NOTE — TELEPHONE ENCOUNTER
Pt informed ,she saying she has a 5 dollar bill on my chart  That she has to pay and dont know why ,would any will be able to help me with this issue  ,thank you

## 2023-03-02 NOTE — TELEPHONE ENCOUNTER
Pt said meloxicam has done nothing for her and with gabapentin she was able to do more and move around better  ,she asking to go back to gabapentin

## 2023-03-06 ENCOUNTER — TELEPHONE (OUTPATIENT)
Dept: FAMILY MEDICINE CLINIC | Facility: CLINIC | Age: 88
End: 2023-03-06
Payer: MEDICARE

## 2023-03-06 NOTE — TELEPHONE ENCOUNTER
Caller: Barbie Hernandez    Relationship: Self    Best call back number: [phone]     What is the best time to reach you: ANYTIME    Who are you requesting to speak with (clinical staff, provider,  specific staff member): CLINICAL    What was the call regarding: PATIENT CALLING STATING THAT SHE WAS EXPOSED TO SOMEONE THAT HAS TESTED POSITIVE FOR COVID SHE WOULD LIKE TO KNOW WHAT THE PROTOCOL IS.    Do you require a callback: YES

## 2023-03-15 ENCOUNTER — TELEPHONE (OUTPATIENT)
Dept: FAMILY MEDICINE CLINIC | Facility: CLINIC | Age: 88
End: 2023-03-15
Payer: MEDICARE

## 2023-03-15 DIAGNOSIS — G89.29 CHRONIC RIGHT-SIDED LOW BACK PAIN WITHOUT SCIATICA: ICD-10-CM

## 2023-03-15 DIAGNOSIS — M54.50 CHRONIC RIGHT-SIDED LOW BACK PAIN WITHOUT SCIATICA: ICD-10-CM

## 2023-03-15 NOTE — TELEPHONE ENCOUNTER
Caller: Barbie Hernandez    Relationship: Self    Best call back number:    [phone] (Mobile)         What is the best time to reach you: ANY    Who are you requesting to speak with (clinical staff, provider,  specific staff member): DR. HAYS     What was the call regarding: PATIENT WANTS TO KNOW IF SHE CAN UP THE GABAPENTIN TO 4 TIMES DAILY     Do you require a callback: YES

## 2023-03-16 RX ORDER — GABAPENTIN 100 MG/1
100 CAPSULE ORAL 4 TIMES DAILY
Qty: 120 CAPSULE | Refills: 2 | OUTPATIENT
Start: 2023-03-16 | End: 2023-03-22 | Stop reason: SDUPTHER

## 2023-03-16 NOTE — TELEPHONE ENCOUNTER
Yes I am glad to increase it to 4 times a day, I increased it on her chart but did not send the prescription in.  When she is ready for refill please let us know so we can send in 120 tablets rather than 90 . thank you

## 2023-03-22 DIAGNOSIS — G89.29 CHRONIC RIGHT-SIDED LOW BACK PAIN WITHOUT SCIATICA: ICD-10-CM

## 2023-03-22 DIAGNOSIS — M54.50 CHRONIC RIGHT-SIDED LOW BACK PAIN WITHOUT SCIATICA: ICD-10-CM

## 2023-03-22 NOTE — TELEPHONE ENCOUNTER
Rx Refill Note  Requested Prescriptions     Pending Prescriptions Disp Refills   • gabapentin (NEURONTIN) 100 MG capsule 120 capsule 2     Sig: Take 1 capsule by mouth 4 (Four) Times a Day.      Last office visit with prescribing clinician: 2/2/2023   Last telemedicine visit with prescribing clinician: 5/18/2023   Next office visit with prescribing clinician: 5/18/2023          ToxASSURE Select 13 (MW) - Urine, Clean Catch (10/27/2022 11:57)                   Would you like a call back once the refill request has been completed: [] Yes [] No    If the office needs to give you a call back, can they leave a voicemail: [] Yes [] No    Linsey Yanes MA  03/22/23, 09:50 EDT

## 2023-03-22 NOTE — TELEPHONE ENCOUNTER
Caller: Barbie Hernandez SUNITHA    Relationship: Self    Best call back number: [phone]    Requested Prescriptions:   Requested Prescriptions     Pending Prescriptions Disp Refills   • gabapentin (NEURONTIN) 100 MG capsule 120 capsule 2     Sig: Take 1 capsule by mouth 4 (Four) Times a Day.        Pharmacy where request should be sent: [id number] [address] AT [facility] [address] - [phone]  - [phone]     Last office visit with prescribing clinician: 2/2/2023   Last telemedicine visit with prescribing clinician: 5/18/2023   Next office visit with prescribing clinician: 5/18/2023     Additional details provided by patient: WILL NEED A NEW PRESCRIPTION.    Does the patient have less than a 3 day supply:  [] Yes  [x] No    Would you like a call back once the refill request has been completed: [] Yes [x] No    If the office needs to give you a call back, can they leave a voicemail: [] Yes [x] No    Avani Briggs Rep   03/22/23 09:24 EDT

## 2023-03-23 RX ORDER — GABAPENTIN 100 MG/1
100 CAPSULE ORAL 4 TIMES DAILY
Qty: 120 CAPSULE | Refills: 2 | Status: SHIPPED | OUTPATIENT
Start: 2023-03-23

## 2023-04-18 DIAGNOSIS — G89.29 CHRONIC RIGHT-SIDED LOW BACK PAIN WITHOUT SCIATICA: ICD-10-CM

## 2023-04-18 DIAGNOSIS — M54.50 CHRONIC RIGHT-SIDED LOW BACK PAIN WITHOUT SCIATICA: ICD-10-CM

## 2023-04-18 RX ORDER — GABAPENTIN 100 MG/1
100 CAPSULE ORAL 4 TIMES DAILY
Qty: 120 CAPSULE | Refills: 2 | Status: SHIPPED | OUTPATIENT
Start: 2023-04-18 | End: 2023-04-20 | Stop reason: SDUPTHER

## 2023-04-18 NOTE — TELEPHONE ENCOUNTER
Caller: Barbie Hernandez SUNITHA    Relationship: Self    Best call back number: [phone]    Requested Prescriptions:   Requested Prescriptions     Pending Prescriptions Disp Refills   • gabapentin (NEURONTIN) 100 MG capsule 120 capsule 2     Sig: Take 1 capsule by mouth 4 (Four) Times a Day.        Pharmacy where request should be sent: [facility] #[id number] [address] AT [facility] [address] - [phone]  - [phone]     Last office visit with prescribing clinician: 2/2/2023   Last telemedicine visit with prescribing clinician: 4/20/2023   Next office visit with prescribing clinician: 4/20/2023     Additional details provided by patient: THE PATIENT IS CURRENTLY OUT OF THIS MEDICATION. THE PATIENT STATES THAT THE PHARMACY WILL NOT FILL THIS MEDICATION UNTIL 04/20/2023. THE PATIENT STATES THAT SHE TOOK HER MEDICINE LIKE SHE WAS SUPPOSED TO, SO SHE IS NOT SURE WHY SHE IS CURRENTLY OUT. PLEASE ADVISE.     Does the patient have less than a 3 day supply:  [x] Yes  [] No    Would you like a call back once the refill request has been completed: [x] Yes [] No    If the office needs to give you a call back, can they leave a voicemail: [x] Yes [] No    Avani Duffy Rep   04/18/23 15:01 EDT

## 2023-04-18 NOTE — TELEPHONE ENCOUNTER
Rx Refill Note  Requested Prescriptions     Pending Prescriptions Disp Refills   • gabapentin (NEURONTIN) 100 MG capsule 120 capsule 2     Sig: Take 1 capsule by mouth 4 (Four) Times a Day.      Last office visit with prescribing clinician: 2/2/2023   Last telemedicine visit with prescribing clinician: 4/20/2023   Next office visit with prescribing clinician: 4/20/2023                         Would you like a call back once the refill request has been completed: [] Yes [] No    If the office needs to give you a call back, can they leave a voicemail: [] Yes [] No    Buzz Esqueda MA  04/18/23, 15:31 EDT

## 2023-04-20 ENCOUNTER — OFFICE VISIT (OUTPATIENT)
Dept: FAMILY MEDICINE CLINIC | Facility: CLINIC | Age: 88
End: 2023-04-20
Payer: MEDICARE

## 2023-04-20 VITALS
DIASTOLIC BLOOD PRESSURE: 74 MMHG | SYSTOLIC BLOOD PRESSURE: 118 MMHG | HEART RATE: 77 BPM | WEIGHT: 135.4 LBS | HEIGHT: 64 IN | TEMPERATURE: 97.8 F | OXYGEN SATURATION: 95 % | BODY MASS INDEX: 23.12 KG/M2

## 2023-04-20 DIAGNOSIS — R29.898 LEFT LEG WEAKNESS: Primary | ICD-10-CM

## 2023-04-20 DIAGNOSIS — G89.29 CHRONIC RIGHT-SIDED LOW BACK PAIN WITHOUT SCIATICA: ICD-10-CM

## 2023-04-20 DIAGNOSIS — R29.90 STROKE-LIKE SYMPTOMS: ICD-10-CM

## 2023-04-20 DIAGNOSIS — I50.9 CONGESTIVE HEART FAILURE, UNSPECIFIED HF CHRONICITY, UNSPECIFIED HEART FAILURE TYPE: ICD-10-CM

## 2023-04-20 DIAGNOSIS — E03.8 OTHER SPECIFIED HYPOTHYROIDISM: ICD-10-CM

## 2023-04-20 DIAGNOSIS — I48.0 PAROXYSMAL ATRIAL FIBRILLATION: ICD-10-CM

## 2023-04-20 DIAGNOSIS — M54.50 CHRONIC RIGHT-SIDED LOW BACK PAIN WITHOUT SCIATICA: ICD-10-CM

## 2023-04-20 PROCEDURE — 99214 OFFICE O/P EST MOD 30 MIN: CPT | Performed by: FAMILY MEDICINE

## 2023-04-20 PROCEDURE — 1160F RVW MEDS BY RX/DR IN RCRD: CPT | Performed by: FAMILY MEDICINE

## 2023-04-20 PROCEDURE — 1159F MED LIST DOCD IN RCRD: CPT | Performed by: FAMILY MEDICINE

## 2023-04-20 RX ORDER — GABAPENTIN 100 MG/1
100 CAPSULE ORAL 4 TIMES DAILY PRN
Qty: 120 CAPSULE | Refills: 3 | Status: SHIPPED | OUTPATIENT
Start: 2023-04-20

## 2023-04-20 RX ORDER — LATANOPROST 50 UG/ML
SOLUTION/ DROPS OPHTHALMIC
COMMUNITY
Start: 2023-01-16

## 2023-04-20 NOTE — PROGRESS NOTES
"Chief Complaint  gabapenting (She could not get them to fill this for her), Back Pain (Pt did not have nothing to say about the lower back pain), and left leg (She cannot put full weight on left leg)    Subjective        Barbie Hernandez presents to [facility]  History of Present Illness  Pleasant 89-year-old female here for chronic lower back pain with spondylolisthesis that has been adequately controlled for gabapentin, she is not taking this prescription currently.    She is having some left leg pain, difficulty bearing weight.  She thinks this started about 1 month ago, she was watching TV and stood to stand up and noticed immense weakness on her left leg.  There was no trauma no pain, no particular event that precipitated her symptoms but came out of the blue.  She has had ongoing weakness since then and cannot walk without a cane or assistance.  She has never needed assistance like this to walk before.  She did have a questionable history of A-fib in the past, Xarelto was stopped, she has been seen by cardiology for this.    Objective   Vital Signs:  /74   Pulse 77   Temp 97.8 °F (36.6 °C)   Ht 162.6 cm (64\")   Wt 61.4 kg (135 lb 6.4 oz)   SpO2 95%   BMI 23.24 kg/m²   Estimated body mass index is 23.24 kg/m² as calculated from the following:    Height as of this encounter: 162.6 cm (64\").    Weight as of this encounter: 61.4 kg (135 lb 6.4 oz).       BMI is within normal parameters. No other follow-up for BMI required.      Physical Exam  Vitals and nursing note reviewed.   Constitutional:       General: She is not in acute distress.     Appearance: She is well-developed.   HENT:      Head: Normocephalic.      Nose: Nose normal.   Cardiovascular:      Rate and Rhythm: Normal rate and regular rhythm.      Heart sounds: Normal heart sounds. No murmur heard.  Pulmonary:      Effort: Pulmonary effort is normal. No respiratory distress.      Breath sounds: Normal breath " sounds.   Musculoskeletal:         General: Normal range of motion.   Skin:     General: Skin is warm and dry.      Findings: No rash.   Neurological:      Mental Status: She is alert and oriented to person, place, and time.      Motor: Weakness present.      Gait: Gait abnormal.      Comments: No cranial nerve deficiencies, however she does have 4 out of 5 weakness on the left leg compared to 5 out of 5 weakness on the right leg.  She is able to raise her toes but not able to stand without assistance of using her arms.  This is not normal for her.   Psychiatric:         Behavior: Behavior normal.         Thought Content: Thought content normal.         Judgment: Judgment normal.        Result Review :                   Assessment and Plan   Diagnoses and all orders for this visit:    1. Left leg weakness (Primary)  -     Ambulatory Referral to Home Health  -     CT Head Without Contrast; Future    2. Chronic right-sided low back pain without sciatica  -     gabapentin (NEURONTIN) 100 MG capsule; Take 1 capsule by mouth 4 (Four) Times a Day As Needed (back pain).  Dispense: 120 capsule; Refill: 3  -     Ambulatory Referral to Home Health    3. Stroke-like symptoms  -     Ambulatory Referral to Home Health  -     CT Head Without Contrast; Future  -     Comprehensive Metabolic Panel  -     CBC & Differential  -     Lipid Panel    4. Congestive heart failure, unspecified HF chronicity, unspecified heart failure type  -     Ambulatory Referral to Home Health  -     Lipid Panel    5. Paroxysmal atrial fibrillation  -     Ambulatory Referral to Home Health  -     CT Head Without Contrast; Future    6. Other specified hypothyroidism  -     TSH Rfx On Abnormal To Free T4    Very pleasant 89-year-old female here for a few concerns.  I was able to have her son Ej (I believe) Join us at the end of the visit to discuss the overall plan and to make sure there were not other details that would be helpful to managing her  care.    Concerns for stroke:  I do think her left leg weakness could be a result of stroke.  No MRI with pacemaker in place.  Unfortunately she is quite unsteady and high risk for fall.  Plan  - CT of the head ordered as above, we may need to progress to a CTA but this would be a second test.  - Referral to home physical therapy as she is homebound, if unable to do this we may need to find a way to help with transportation to get her to regular PT, we discussed opportunities to work with social work to help with transportation.  For now they think they are okay but will let me know if they would like referral to social work.  - We discussed if we should start an aspirin or not.  We elected to not start an aspirin with high risk of fall and risk of intracranial hemorrhage.  She does live independently.  - Warnings to go to the ER given    In terms of lower back pain:  She is doing better she has been off gabapentin for almost a week now with no significant change to her symptoms.  Plan:  - Discussed taking gabapentin only on days that her symptoms are more severe as the gabapentin could increase her risk of fall.  If she is able to stop this medication that would probably be best.    We will get labs as above.  Due to her lab being closed states acceptable for us to do this with the next visit unless her symptoms continue I would like to get labs done sooner.       Follow Up   Return in about 3 months (around 7/20/2023), or if symptoms worsen or fail to improve, for Recheck left leg weakness, she has an appointment already scheduled for May..  Patient was given instructions and counseling regarding her condition or for health maintenance advice. Please see specific information pulled into the AVS if appropriate.     Tika Perry MD

## 2023-04-21 ENCOUNTER — HOME HEALTH ADMISSION (OUTPATIENT)
Dept: HOME HEALTH SERVICES | Facility: HOME HEALTHCARE | Age: 88
End: 2023-04-21
Payer: MEDICARE

## 2023-05-16 ENCOUNTER — HOSPITAL ENCOUNTER (OUTPATIENT)
Dept: CT IMAGING | Facility: HOSPITAL | Age: 88
Discharge: HOME | End: 2023-05-16
Admitting: FAMILY MEDICINE
Payer: MEDICARE

## 2023-05-16 DIAGNOSIS — I48.0 PAROXYSMAL ATRIAL FIBRILLATION: ICD-10-CM

## 2023-05-16 DIAGNOSIS — R29.90 STROKE-LIKE SYMPTOMS: ICD-10-CM

## 2023-05-16 DIAGNOSIS — R29.898 LEFT LEG WEAKNESS: ICD-10-CM

## 2023-05-16 PROCEDURE — 70450 CT HEAD/BRAIN W/O DYE: CPT

## 2023-05-18 ENCOUNTER — OFFICE VISIT (OUTPATIENT)
Dept: FAMILY MEDICINE CLINIC | Facility: CLINIC | Age: 88
End: 2023-05-18
Payer: MEDICARE

## 2023-05-18 VITALS
OXYGEN SATURATION: 98 % | BODY MASS INDEX: 23.05 KG/M2 | WEIGHT: 135 LBS | TEMPERATURE: 98 F | DIASTOLIC BLOOD PRESSURE: 72 MMHG | HEART RATE: 80 BPM | SYSTOLIC BLOOD PRESSURE: 124 MMHG | HEIGHT: 64 IN

## 2023-05-18 DIAGNOSIS — G89.29 CHRONIC RIGHT-SIDED LOW BACK PAIN WITHOUT SCIATICA: Primary | ICD-10-CM

## 2023-05-18 DIAGNOSIS — I49.5 TACHYCARDIA-BRADYCARDIA: ICD-10-CM

## 2023-05-18 DIAGNOSIS — M19.011 ARTHROPATHY OF RIGHT SHOULDER: ICD-10-CM

## 2023-05-18 DIAGNOSIS — Z51.81 THERAPEUTIC DRUG MONITORING: ICD-10-CM

## 2023-05-18 DIAGNOSIS — M54.50 CHRONIC RIGHT-SIDED LOW BACK PAIN WITHOUT SCIATICA: Primary | ICD-10-CM

## 2023-05-18 DIAGNOSIS — E03.8 OTHER SPECIFIED HYPOTHYROIDISM: ICD-10-CM

## 2023-05-18 DIAGNOSIS — I50.42 CHRONIC COMBINED SYSTOLIC AND DIASTOLIC CONGESTIVE HEART FAILURE: ICD-10-CM

## 2023-05-18 RX ORDER — TRIAMCINOLONE ACETONIDE 40 MG/ML
80 INJECTION, SUSPENSION INTRA-ARTICULAR; INTRAMUSCULAR ONCE
Status: COMPLETED | OUTPATIENT
Start: 2023-05-18 | End: 2023-05-18

## 2023-05-18 RX ADMIN — TRIAMCINOLONE ACETONIDE 80 MG: 40 INJECTION, SUSPENSION INTRA-ARTICULAR; INTRAMUSCULAR at 12:43

## 2023-05-18 NOTE — PROGRESS NOTES
"Chief Complaint  Back Pain (Follow up on gabapentin doing ok ) and Shoulder Pain (Right shoulder is hurting stiff and is seen ortho )    Subjective        Barbie Hernandez presents to [facility]  History of Present Illness  Pleasant 89-year-old female here to follow-up for lower back pain that is overall well controlled on gabapentin and right shoulder pain that is not well controlled.    From a heart failure standpoint, saw cardiology May 8, she has had slightly higher blood pressures so her carvedilol dose was increased from 3.125-6.25.  Goal blood pressures above 100 systolic.  She will follow-up with them in 2 weeks, thankfully symptomatic and tolerating it well pacemaker in place with both bradycardia and tachycardia, will be seeing a new specialist to see if we can transition her to home monitoring.  Most recent echocardiogram 2021, EF of 46%.  Septal hypokinesis.    He has also had chronic right shoulder pain has been present for years, she has had 2 injections in the past that has been helpful.  However she is now having difficulty raising her right arm and brushing her hair or doing any activities with a raised arm   Objective   Vital Signs:  /72   Pulse 80   Temp 98 °F (36.7 °C)   Ht 162.6 cm (64\")   Wt 61.2 kg (135 lb)   SpO2 98%   BMI 23.17 kg/m²   Estimated body mass index is 23.17 kg/m² as calculated from the following:    Height as of this encounter: 162.6 cm (64\").    Weight as of this encounter: 61.2 kg (135 lb).       BMI is within normal parameters. No other follow-up for BMI required.      Physical Exam  Vitals and nursing note reviewed.   Constitutional:       General: She is not in acute distress.     Appearance: She is well-developed.   HENT:      Head: Normocephalic.      Nose: Nose normal.   Cardiovascular:      Rate and Rhythm: Normal rate and regular rhythm.      Heart sounds: Normal heart sounds. No murmur heard.  Pulmonary:      Effort: " Pulmonary effort is normal. No respiratory distress.      Breath sounds: Normal breath sounds.   Musculoskeletal:         General: Normal range of motion.   Skin:     General: Skin is warm and dry.      Findings: No rash.   Neurological:      Mental Status: She is alert and oriented to person, place, and time.   Psychiatric:         Behavior: Behavior normal.         Thought Content: Thought content normal.         Judgment: Judgment normal.        Result Review :            Arthrocentesis    Date/Time: 5/18/2023 9:58 AM  Performed by: Tika Perry MD  Authorized by: Tika Perry MD   Consent: Verbal consent obtained.  Risks and benefits: risks, benefits and alternatives were discussed  Consent given by: patient  Patient identity confirmed: verbally with patient  Indications: pain   Body area: shoulder  Joint: right shoulder  Preparation: Patient was prepped and draped in the usual sterile fashion.  Needle size: 22 G  Approach: posterior  Patient tolerance: Patient tolerated the procedure well with no immediate complications  Comments: 80 units of Kenalog and 6 cc of lidocaine 1% without epi            Assessment and Plan   Diagnoses and all orders for this visit:    1. Chronic right-sided low back pain without sciatica (Primary)    2. Chronic combined systolic and diastolic congestive heart failure  -     Urinalysis With Microscopic - Urine, Clean Catch    3. Tachycardia-bradycardia    4. Other specified hypothyroidism    5. Therapeutic drug monitoring  -     ToxASSURE Select 13 (MW) - Urine, Clean Catch    6. Arthropathy of right shoulder  -     Arthrocentesis  -     triamcinolone acetonide (KENALOG-40) injection 80 mg    Very pleasant 89-year-old female here for chronic lower back pain overall well controlled on gabapentin 100 mg up to 4 times a day, she may take 1 during the day and 3 at night which is her usual procedure..  Due for tox today Brennen appropriate.    Heart failure with septal wall  hypokinesis and atrial fibrillation that is currently stable but blood pressure has been slightly higher, her cardiologist has been increasing her carvedilol gradually and she is thankfully tolerating it well.  She is currently doing 6.25 mg once a day and 3.125 mg once a day.  Continue follow-up with cardiology.    In terms of right shoulder pain, chronic problem, repeat injection today, we discussed that it would take about 5 days apart to set it up.  She did verbally agree to risks of infection and bleeding, no complications with injection.       Follow Up   Return in about 3 months (around 8/18/2023), or if symptoms worsen or fail to improve, for Recheck back pain .  Patient was given instructions and counseling regarding her condition or for health maintenance advice. Please see specific information pulled into the AVS if appropriate.     Tika Perry MD

## 2023-05-19 LAB
ALBUMIN SERPL-MCNC: 4.1 G/DL (ref 3.5–5.2)
ALBUMIN/GLOB SERPL: 1.5 G/DL
ALP SERPL-CCNC: 54 U/L (ref 39–117)
ALT SERPL-CCNC: 12 U/L (ref 1–33)
AST SERPL-CCNC: 18 U/L (ref 1–32)
BASOPHILS # BLD AUTO: 0.04 10*3/MM3 (ref 0–0.2)
BASOPHILS NFR BLD AUTO: 0.9 % (ref 0–1.5)
BILIRUB SERPL-MCNC: 0.4 MG/DL (ref 0–1.2)
BUN SERPL-MCNC: 18 MG/DL (ref 8–23)
BUN/CREAT SERPL: 23.1 (ref 7–25)
CALCIUM SERPL-MCNC: 9.8 MG/DL (ref 8.2–9.6)
CHLORIDE SERPL-SCNC: 106 MMOL/L (ref 98–107)
CHOLEST SERPL-MCNC: 179 MG/DL (ref 0–200)
CO2 SERPL-SCNC: 26 MMOL/L (ref 22–29)
CREAT SERPL-MCNC: 0.78 MG/DL (ref 0.57–1)
EGFRCR SERPLBLD CKD-EPI 2021: 72.3 ML/MIN/1.73
EOSINOPHIL # BLD AUTO: 0.11 10*3/MM3 (ref 0–0.4)
EOSINOPHIL NFR BLD AUTO: 2.4 % (ref 0.3–6.2)
ERYTHROCYTE [DISTWIDTH] IN BLOOD BY AUTOMATED COUNT: 13.4 % (ref 12.3–15.4)
GLOBULIN SER CALC-MCNC: 2.7 GM/DL
GLUCOSE SERPL-MCNC: 82 MG/DL (ref 65–99)
HCT VFR BLD AUTO: 39.4 % (ref 34–46.6)
HDLC SERPL-MCNC: 68 MG/DL (ref 40–60)
HGB BLD-MCNC: 13.3 G/DL (ref 12–15.9)
IMM GRANULOCYTES # BLD AUTO: 0.01 10*3/MM3 (ref 0–0.05)
IMM GRANULOCYTES NFR BLD AUTO: 0.2 % (ref 0–0.5)
LDLC SERPL CALC-MCNC: 98 MG/DL (ref 0–100)
LYMPHOCYTES # BLD AUTO: 0.87 10*3/MM3 (ref 0.7–3.1)
LYMPHOCYTES NFR BLD AUTO: 18.7 % (ref 19.6–45.3)
MCH RBC QN AUTO: 29 PG (ref 26.6–33)
MCHC RBC AUTO-ENTMCNC: 33.8 G/DL (ref 31.5–35.7)
MCV RBC AUTO: 86 FL (ref 79–97)
MONOCYTES # BLD AUTO: 0.73 10*3/MM3 (ref 0.1–0.9)
MONOCYTES NFR BLD AUTO: 15.7 % (ref 5–12)
NEUTROPHILS # BLD AUTO: 2.89 10*3/MM3 (ref 1.7–7)
NEUTROPHILS NFR BLD AUTO: 62.1 % (ref 42.7–76)
NRBC BLD AUTO-RTO: 0 /100 WBC (ref 0–0.2)
PLATELET # BLD AUTO: 179 10*3/MM3 (ref 140–450)
POTASSIUM SERPL-SCNC: 4.8 MMOL/L (ref 3.5–5.2)
PROT SERPL-MCNC: 6.8 G/DL (ref 6–8.5)
RBC # BLD AUTO: 4.58 10*6/MM3 (ref 3.77–5.28)
SODIUM SERPL-SCNC: 141 MMOL/L (ref 136–145)
T4 FREE SERPL-MCNC: 2.04 NG/DL (ref 0.93–1.7)
TRIGL SERPL-MCNC: 69 MG/DL (ref 0–150)
TSH SERPL DL<=0.005 MIU/L-ACNC: 0.32 UIU/ML (ref 0.27–4.2)
VLDLC SERPL CALC-MCNC: 13 MG/DL (ref 5–40)
WBC # BLD AUTO: 4.65 10*3/MM3 (ref 3.4–10.8)

## 2023-05-24 LAB
APPEARANCE UR: CLEAR
BACTERIA #/AREA URNS HPF: NORMAL /HPF
BILIRUB UR QL STRIP: NEGATIVE
CASTS URNS MICRO: NORMAL
COLOR UR: YELLOW
DRUGS UR: NORMAL
EPI CELLS #/AREA URNS HPF: NORMAL /HPF
GLUCOSE UR QL STRIP: NEGATIVE
HGB UR QL STRIP: NEGATIVE
KETONES UR QL STRIP: NEGATIVE
LEUKOCYTE ESTERASE UR QL STRIP: NEGATIVE
NITRITE UR QL STRIP: NEGATIVE
PH UR STRIP: 6.5 [PH] (ref 5–8)
PROT UR QL STRIP: NEGATIVE
RBC #/AREA URNS HPF: NORMAL /HPF
SP GR UR STRIP: 1.01 (ref 1–1.03)
UROBILINOGEN UR STRIP-MCNC: NORMAL MG/DL
WBC #/AREA URNS HPF: NORMAL /HPF

## 2023-07-21 NOTE — PROGRESS NOTES
"Chief Complaint  Cerumen Impaction (Bilateral impaction)    Subjective        Barbie Hernandez presents to [facility]  History of Present Illness  Barbie Hernandez is a 89 y.o. female who presents to the clinic today with concerns of bilateral cerumen impaction.  Patient went to her audiologist recently who told her she has bilateral cerumen impaction.  Patient had been using baby oil 2 times a week to help soften the earwax.  She has bilateral hearing aids.  She denies past history of ruptured eardrum.    Review of Systems   HENT:  Positive for hearing loss.      Objective   Vital Signs:  /62   Pulse 89   Temp 98 °F (36.7 °C)   Ht 162.6 cm (64\")   Wt 61.7 kg (136 lb)   SpO2 95%   BMI 23.34 kg/m²   Estimated body mass index is 23.34 kg/m² as calculated from the following:    Height as of this encounter: 162.6 cm (64\").    Weight as of this encounter: 61.7 kg (136 lb).       BMI is within normal parameters. No other follow-up for BMI required.      Physical Exam  Vitals reviewed.   Constitutional:       General: She is not in acute distress.     Appearance: Normal appearance. She is not ill-appearing, toxic-appearing or diaphoretic.   HENT:      Head: Normocephalic and atraumatic.      Right Ear: There is impacted cerumen.      Left Ear: There is impacted cerumen.   Eyes:      General: No scleral icterus.        Right eye: No discharge.         Left eye: No discharge.      Extraocular Movements: Extraocular movements intact.   Pulmonary:      Effort: Pulmonary effort is normal. No respiratory distress.   Neurological:      Mental Status: She is alert.      Motor: No weakness.      Gait: Gait normal.   Psychiatric:         Mood and Affect: Mood normal.         Behavior: Behavior normal.      Result Review :            Ear Cerumen Removal    Date/Time: 7/24/2023 2:00 PM  Performed by: Neelima Camarillo APRN  Authorized by: Neelima Camarillo APRN   Consent: Verbal consent " obtained.  Risks and benefits: risks, benefits and alternatives were discussed  Location details: left ear and right ear  Patient tolerance: patient tolerated the procedure well with no immediate complications  Comments: Cerumen removed from bilateral ear canals.  Patient tolerated well.  TM intact bilaterally.  Procedure type: instrumentation, irrigation         Assessment and Plan   Diagnoses and all orders for this visit:    1. Bilateral impacted cerumen (Primary)  -     Ear Cerumen Removal      Patient tolerated cerumen removal well.  TM intact bilaterally after irrigation.  Patient advised to follow-up as needed.       Follow Up   Return if symptoms worsen or fail to improve, for Next scheduled follow up.  Patient was given instructions and counseling regarding her condition or for health maintenance advice. Please see specific information pulled into the AVS if appropriate.       Electronically signed by ROMINA Duke, 07/24/23, 2:37 PM EDT.

## 2023-07-24 ENCOUNTER — OFFICE VISIT (OUTPATIENT)
Dept: FAMILY MEDICINE CLINIC | Facility: CLINIC | Age: 88
End: 2023-07-24
Payer: MEDICARE

## 2023-07-24 VITALS
TEMPERATURE: 98 F | SYSTOLIC BLOOD PRESSURE: 116 MMHG | WEIGHT: 136 LBS | BODY MASS INDEX: 23.22 KG/M2 | OXYGEN SATURATION: 95 % | HEIGHT: 64 IN | DIASTOLIC BLOOD PRESSURE: 62 MMHG | HEART RATE: 89 BPM

## 2023-07-24 DIAGNOSIS — H61.23 BILATERAL IMPACTED CERUMEN: Primary | ICD-10-CM

## 2023-07-24 PROCEDURE — 1159F MED LIST DOCD IN RCRD: CPT | Performed by: NURSE PRACTITIONER

## 2023-07-24 PROCEDURE — 69210 REMOVE IMPACTED EAR WAX UNI: CPT | Performed by: NURSE PRACTITIONER

## 2023-07-24 PROCEDURE — 1160F RVW MEDS BY RX/DR IN RCRD: CPT | Performed by: NURSE PRACTITIONER

## 2023-07-24 PROCEDURE — 99212 OFFICE O/P EST SF 10 MIN: CPT | Performed by: NURSE PRACTITIONER

## 2023-07-24 NOTE — Clinical Note
Vangie Villela,  This patient was seeing Dr. Pascual, but she is retiring. She was wondering if you would take over the maintenance of Reclast for her. I told her I would ask you. She is scheduled with you in August.   Neelima

## 2023-08-31 ENCOUNTER — OFFICE VISIT (OUTPATIENT)
Dept: FAMILY MEDICINE CLINIC | Facility: CLINIC | Age: 88
End: 2023-08-31
Payer: MEDICARE

## 2023-08-31 VITALS
OXYGEN SATURATION: 97 % | BODY MASS INDEX: 23.05 KG/M2 | TEMPERATURE: 97.6 F | SYSTOLIC BLOOD PRESSURE: 128 MMHG | HEART RATE: 70 BPM | WEIGHT: 135 LBS | HEIGHT: 64 IN | DIASTOLIC BLOOD PRESSURE: 82 MMHG

## 2023-08-31 DIAGNOSIS — I50.42 CHRONIC COMBINED SYSTOLIC AND DIASTOLIC CONGESTIVE HEART FAILURE: ICD-10-CM

## 2023-08-31 DIAGNOSIS — M54.50 CHRONIC RIGHT-SIDED LOW BACK PAIN WITHOUT SCIATICA: Primary | ICD-10-CM

## 2023-08-31 DIAGNOSIS — E03.8 OTHER SPECIFIED HYPOTHYROIDISM: ICD-10-CM

## 2023-08-31 DIAGNOSIS — G89.29 CHRONIC RIGHT-SIDED LOW BACK PAIN WITHOUT SCIATICA: Primary | ICD-10-CM

## 2023-08-31 DIAGNOSIS — M81.0 AGE-RELATED OSTEOPOROSIS WITHOUT CURRENT PATHOLOGICAL FRACTURE: ICD-10-CM

## 2023-08-31 PROCEDURE — 99213 OFFICE O/P EST LOW 20 MIN: CPT | Performed by: FAMILY MEDICINE

## 2023-08-31 PROCEDURE — 1160F RVW MEDS BY RX/DR IN RCRD: CPT | Performed by: FAMILY MEDICINE

## 2023-08-31 PROCEDURE — 1159F MED LIST DOCD IN RCRD: CPT | Performed by: FAMILY MEDICINE

## 2023-08-31 NOTE — PROGRESS NOTES
"Chief Complaint  Back Pain (Follow up on medications no complains  medication is working ) and Cerumen Impaction (Pt would like to make sure she has not build up for wax )    Subjective        Barbie Hernandez presents to [facility]  History of Present Illness  Pleasant 89-year-old female to follow-up for lower back pain that is well controlled on gabapentin, tox up-to-date Brennen reviewed and appropriate, contract up-to-date.  She is thankfully doing well on this medication adverse effects.    She is very fatigued but seeing an ep cardiologist tomorrow, she already has a pacemaker in place and knows that the heart failure and A-fib may be contributing to her fatigue.     She also is having some cerumen impaction, wants to have this checked.    Objective   Vital Signs:  /82   Pulse 70   Temp 97.6 øF (36.4 øC)   Ht 162.6 cm (64\")   Wt 61.2 kg (135 lb)   SpO2 97%   BMI 23.17 kg/mý   Estimated body mass index is 23.17 kg/mý as calculated from the following:    Height as of this encounter: 162.6 cm (64\").    Weight as of this encounter: 61.2 kg (135 lb).       BMI is within normal parameters. No other follow-up for BMI required.      Physical Exam  Vitals and nursing note reviewed.   Constitutional:       General: She is not in acute distress.     Appearance: She is well-developed.   HENT:      Head: Normocephalic.      Right Ear: Tympanic membrane, ear canal and external ear normal. There is no impacted cerumen.      Left Ear: Tympanic membrane, ear canal and external ear normal. There is no impacted cerumen.      Nose: Nose normal.   Cardiovascular:      Rate and Rhythm: Normal rate and regular rhythm.      Heart sounds: Normal heart sounds. No murmur heard.  Pulmonary:      Effort: Pulmonary effort is normal. No respiratory distress.      Breath sounds: Normal breath sounds.   Musculoskeletal:         General: Normal range of motion.   Skin:     General: Skin is warm and " dry.      Findings: No rash.   Neurological:      Mental Status: She is alert and oriented to person, place, and time.   Psychiatric:         Behavior: Behavior normal.         Thought Content: Thought content normal.         Judgment: Judgment normal.      Result Review :                   Assessment and Plan   Diagnoses and all orders for this visit:    1. Chronic right-sided low back pain without sciatica (Primary)    2. Age-related osteoporosis without current pathological fracture  -     Vitamin D,25-Hydroxy; Future    3. Other specified hypothyroidism  -     TSH Rfx On Abnormal To Free T4; Future    4. Chronic combined systolic and diastolic congestive heart failure  -     Comprehensive Metabolic Panel; Future  -     CBC & Differential; Future  -     Lipid Panel; Future    Chronic back pain well-controlled on gabapentin 100 mg 4 times a day, up-to-date on agreements, Brennen and tox.    We will follow eval with cardiology, fatigue likely cardiac in nature.    Labs due with next visit.    Normal ear exam reassured patient.         Follow Up   Return in about 3 months (around 11/30/2023), or if symptoms worsen or fail to improve, for Recheck HTN and thyroid with labs prior (unless she needs a ride).  Patient was given instructions and counseling regarding her condition or for health maintenance advice. Please see specific information pulled into the AVS if appropriate.     Tika Perry MD

## 2023-09-26 PROBLEM — M81.0 SENILE OSTEOPOROSIS: Status: ACTIVE | Noted: 2023-09-26

## 2023-09-26 RX ORDER — ZOLEDRONIC ACID 5 MG/100ML
5 INJECTION, SOLUTION INTRAVENOUS ONCE
Status: CANCELLED | OUTPATIENT
Start: 2023-09-29

## 2023-09-29 ENCOUNTER — HOSPITAL ENCOUNTER (OUTPATIENT)
Dept: INFUSION THERAPY | Facility: HOSPITAL | Age: 88
Discharge: HOME | End: 2023-09-29
Payer: MEDICARE

## 2023-09-29 VITALS
BODY MASS INDEX: 23.17 KG/M2 | HEART RATE: 90 BPM | RESPIRATION RATE: 16 BRPM | OXYGEN SATURATION: 97 % | WEIGHT: 135 LBS | SYSTOLIC BLOOD PRESSURE: 130 MMHG | DIASTOLIC BLOOD PRESSURE: 74 MMHG | TEMPERATURE: 96.8 F

## 2023-09-29 DIAGNOSIS — M81.0 AGE-RELATED OSTEOPOROSIS WITHOUT CURRENT PATHOLOGICAL FRACTURE: Primary | ICD-10-CM

## 2023-09-29 DIAGNOSIS — M81.0 SENILE OSTEOPOROSIS: ICD-10-CM

## 2023-09-29 PROCEDURE — 84443 ASSAY THYROID STIM HORMONE: CPT | Performed by: FAMILY MEDICINE

## 2023-09-29 PROCEDURE — 80061 LIPID PANEL: CPT | Performed by: FAMILY MEDICINE

## 2023-09-29 PROCEDURE — 96374 THER/PROPH/DIAG INJ IV PUSH: CPT

## 2023-09-29 PROCEDURE — 85025 COMPLETE CBC W/AUTO DIFF WBC: CPT | Performed by: FAMILY MEDICINE

## 2023-09-29 PROCEDURE — 80053 COMPREHEN METABOLIC PANEL: CPT | Performed by: FAMILY MEDICINE

## 2023-09-29 PROCEDURE — 36415 COLL VENOUS BLD VENIPUNCTURE: CPT

## 2023-09-29 PROCEDURE — 82306 VITAMIN D 25 HYDROXY: CPT | Performed by: FAMILY MEDICINE

## 2023-09-29 PROCEDURE — 96365 THER/PROPH/DIAG IV INF INIT: CPT

## 2023-09-29 PROCEDURE — 25010000002 ZOLEDRONIC ACID 5 MG/100ML SOLUTION: Performed by: OBSTETRICS & GYNECOLOGY

## 2023-09-29 RX ORDER — ZOLEDRONIC ACID 5 MG/100ML
5 INJECTION, SOLUTION INTRAVENOUS ONCE
Status: COMPLETED | OUTPATIENT
Start: 2023-09-29 | End: 2023-09-29

## 2023-09-29 RX ORDER — ZOLEDRONIC ACID 5 MG/100ML
5 INJECTION, SOLUTION INTRAVENOUS ONCE
Status: CANCELLED | OUTPATIENT
Start: 2023-09-29

## 2023-09-29 RX ADMIN — ZOLEDRONIC ACID 5 MG: 5 INJECTION, SOLUTION INTRAVENOUS at 11:30

## 2023-09-29 NOTE — PROGRESS NOTES
Labs reviewed and within normal limits to receive Reclast. Calcium is 9.2  Crockcroft- Gault score calculated 39.358                Pt tolerated well.

## 2023-10-03 DIAGNOSIS — G89.29 CHRONIC RIGHT-SIDED LOW BACK PAIN WITHOUT SCIATICA: ICD-10-CM

## 2023-10-03 DIAGNOSIS — M54.50 CHRONIC RIGHT-SIDED LOW BACK PAIN WITHOUT SCIATICA: ICD-10-CM

## 2023-10-03 RX ORDER — GABAPENTIN 100 MG/1
CAPSULE ORAL
Qty: 120 CAPSULE | Refills: 2 | Status: SHIPPED | OUTPATIENT
Start: 2023-10-03

## 2023-12-14 ENCOUNTER — OFFICE VISIT (OUTPATIENT)
Dept: FAMILY MEDICINE CLINIC | Facility: CLINIC | Age: 88
End: 2023-12-14
Payer: MEDICARE

## 2023-12-14 VITALS
HEIGHT: 64 IN | HEART RATE: 97 BPM | WEIGHT: 131 LBS | BODY MASS INDEX: 22.36 KG/M2 | TEMPERATURE: 97.6 F | SYSTOLIC BLOOD PRESSURE: 118 MMHG | DIASTOLIC BLOOD PRESSURE: 76 MMHG | OXYGEN SATURATION: 98 %

## 2023-12-14 DIAGNOSIS — G89.29 CHRONIC RIGHT-SIDED LOW BACK PAIN WITHOUT SCIATICA: Primary | ICD-10-CM

## 2023-12-14 DIAGNOSIS — M43.16 SPONDYLOLISTHESIS OF LUMBAR REGION: ICD-10-CM

## 2023-12-14 DIAGNOSIS — M54.50 CHRONIC RIGHT-SIDED LOW BACK PAIN WITHOUT SCIATICA: Primary | ICD-10-CM

## 2023-12-14 DIAGNOSIS — H61.23 BILATERAL IMPACTED CERUMEN: ICD-10-CM

## 2023-12-14 NOTE — PROGRESS NOTES
"Chief Complaint  Back Pain (Follow up gabapentin no complauins ) and Ear Fullness (Would like ear clean today )    Subjective        Barbie Hernandez presents to [facility]  History of Present Illness  Pleasant 89-year-old female here for lower back pain which is adequately well-controlled on gabapentin-not wanting to do another injection right now.  However she does feel that her symptoms are tolerable and does not want to make changes.      Also she has had some ear fullness.  It is bilateral, she feels that is impacting the ears but is otherwise not painful.    Objective   Vital Signs:  /76   Pulse 97   Temp 97.6 °F (36.4 °C)   Ht 162.6 cm (64\")   Wt 59.4 kg (131 lb)   SpO2 98%   BMI 22.49 kg/m²   Estimated body mass index is 22.49 kg/m² as calculated from the following:    Height as of this encounter: 162.6 cm (64\").    Weight as of this encounter: 59.4 kg (131 lb).       BMI is within normal parameters. No other follow-up for BMI required.      Physical Exam  Vitals and nursing note reviewed.   Constitutional:       General: She is not in acute distress.     Appearance: She is well-developed.   HENT:      Head: Normocephalic.      Right Ear: Tympanic membrane, ear canal and external ear normal. There is no impacted cerumen.      Left Ear: Tympanic membrane, ear canal and external ear normal. There is no impacted cerumen.      Ears:      Comments: Bilateral cerumen completely removed with water irrigation, no complications tolerated well.     Nose: Nose normal.   Eyes:      General: No scleral icterus.  Pulmonary:      Effort: Pulmonary effort is normal. No respiratory distress.   Musculoskeletal:         General: Normal range of motion.   Skin:     General: Skin is warm and dry.      Findings: No rash.   Neurological:      Mental Status: She is alert and oriented to person, place, and time.   Psychiatric:         Behavior: Behavior normal.         Thought Content: " Thought content normal.         Judgment: Judgment normal.        Result Review :            Ear Cerumen Removal    Date/Time: 12/14/2023 11:47 AM    Performed by: Tika Perry MD  Authorized by: Tika Perry MD  Consent: Verbal consent obtained.  Risks and benefits: risks, benefits and alternatives were discussed  Consent given by: patient  Patient understanding: patient states understanding of the procedure being performed  Patient identity confirmed: verbally with patient  Location details: right ear and left ear  Patient tolerance: Patient tolerated the procedure well with no immediate complications  Procedure type: irrigation           Assessment and Plan   Diagnoses and all orders for this visit:    1. Chronic right-sided low back pain without sciatica (Primary)  Assessment & Plan:  Chronic lower back pain well controlled, she does have right-sided symptoms predominant.  No adverse effects to medication.  Usually her symptoms are controlled on the current regimen but occasionally episodes that are breakthrough.  She is seeing pain management for epidurals, not wanting further interventions at this time.  Brennen reviewed and appropriate.    Plan:  -Continue gabapentin 100 mg 3 times a day as needed  No adverse effects to medication.  - Limit activities that worsen back pain like vacuuming.  -Follow-up in 3 months.       2. Bilateral impacted cerumen  -     Ear Cerumen Removal    3. Spondylolisthesis of lumbar region    Will get updated contract and UDS with next visit.         Follow Up   Return in about 3 months (around 3/14/2024), or if symptoms worsen or fail to improve, for Medicare Wellness with fasting labs prior (OK to over book) .  Patient was given instructions and counseling regarding her condition or for health maintenance advice. Please see specific information pulled into the AVS if appropriate.     Tika Perry MD

## 2024-02-14 DIAGNOSIS — M54.50 CHRONIC RIGHT-SIDED LOW BACK PAIN WITHOUT SCIATICA: ICD-10-CM

## 2024-02-14 DIAGNOSIS — G89.29 CHRONIC RIGHT-SIDED LOW BACK PAIN WITHOUT SCIATICA: ICD-10-CM

## 2024-02-15 RX ORDER — GABAPENTIN 100 MG/1
CAPSULE ORAL
Qty: 120 CAPSULE | Refills: 0 | Status: SHIPPED | OUTPATIENT
Start: 2024-02-15

## 2024-03-14 DIAGNOSIS — I49.5 TACHYCARDIA-BRADYCARDIA: ICD-10-CM

## 2024-03-14 DIAGNOSIS — E03.8 OTHER SPECIFIED HYPOTHYROIDISM: ICD-10-CM

## 2024-03-14 DIAGNOSIS — I48.0 PAROXYSMAL ATRIAL FIBRILLATION: ICD-10-CM

## 2024-03-14 DIAGNOSIS — I70.293 ATHEROSCLEROSIS OF NATIVE ARTERY OF BOTH LOWER EXTREMITIES WITH OTHER CLINICAL MANIFESTATION: ICD-10-CM

## 2024-03-14 DIAGNOSIS — I50.42 CHRONIC COMBINED SYSTOLIC AND DIASTOLIC CONGESTIVE HEART FAILURE: ICD-10-CM

## 2024-03-14 DIAGNOSIS — I70.293 ATHEROSCLEROSIS OF NATIVE ARTERY OF BOTH LOWER EXTREMITIES WITH OTHER CLINICAL MANIFESTATION: Primary | ICD-10-CM

## 2024-03-14 DIAGNOSIS — I50.9 CONGESTIVE HEART FAILURE, UNSPECIFIED HF CHRONICITY, UNSPECIFIED HEART FAILURE TYPE: ICD-10-CM

## 2024-03-14 DIAGNOSIS — E03.8 OTHER SPECIFIED HYPOTHYROIDISM: Primary | ICD-10-CM

## 2024-03-19 DIAGNOSIS — M54.50 CHRONIC RIGHT-SIDED LOW BACK PAIN WITHOUT SCIATICA: ICD-10-CM

## 2024-03-19 DIAGNOSIS — G89.29 CHRONIC RIGHT-SIDED LOW BACK PAIN WITHOUT SCIATICA: ICD-10-CM

## 2024-03-19 RX ORDER — GABAPENTIN 100 MG/1
100 CAPSULE ORAL 4 TIMES DAILY
Qty: 120 CAPSULE | Refills: 0 | Status: SHIPPED | OUTPATIENT
Start: 2024-03-19

## 2024-03-19 NOTE — TELEPHONE ENCOUNTER
Caller: Barbie Hernandez SUNITHA    Relationship: Self    Best call back number: [phone]     Requested Prescriptions:   Requested Prescriptions     Pending Prescriptions Disp Refills    gabapentin (NEURONTIN) 100 MG capsule 120 capsule 0     Sig: Take 1 capsule by mouth 4 (Four) Times a Day.        [id number] [address] [facility] [address] - [phone]  - [phone]     Last office visit with prescribing clinician: 12/14/2023   Last telemedicine visit with prescribing clinician: Visit date not found   Next office visit with prescribing clinician: 3/28/2024     Additional details provided by patient: PATIENT IS REQUESTING A 90 DAY SUPPLY ON THIS MEDICATION IF POSSIBLE.     PATIENT IS COMPLETELY OUT OF THIS MEDICATION.     Does the patient have less than a 3 day supply:  [x] Yes  [] No    Would you like a call back once the refill request has been completed: [] Yes [x] No    If the office needs to give you a call back, can they leave a voicemail: [x] Yes [] No    Avani Evans Rep   03/19/24 15:02 EDT

## 2024-03-19 NOTE — TELEPHONE ENCOUNTER
Rx Refill Note  Requested Prescriptions     Pending Prescriptions Disp Refills    gabapentin (NEURONTIN) 100 MG capsule 120 capsule 0     Sig: Take 1 capsule by mouth 4 (Four) Times a Day.      Last office visit with prescribing clinician: 12/14/2023   Last telemedicine visit with prescribing clinician: Visit date not found   Next office visit with prescribing clinician: 3/28/2024                         Would you like a call back once the refill request has been completed: [] Yes [] No    If the office needs to give you a call back, can they leave a voicemail: [] Yes [] No    Buzz Esqueda CMA/MISAEL  03/19/24, 15:06 EDT

## 2024-03-26 DIAGNOSIS — M54.50 CHRONIC RIGHT-SIDED LOW BACK PAIN WITHOUT SCIATICA: ICD-10-CM

## 2024-03-26 DIAGNOSIS — G89.29 CHRONIC RIGHT-SIDED LOW BACK PAIN WITHOUT SCIATICA: ICD-10-CM

## 2024-03-26 LAB
ALBUMIN SERPL-MCNC: 3.9 G/DL (ref 3.5–5.2)
ALBUMIN/GLOB SERPL: 1.7 G/DL
ALP SERPL-CCNC: 55 U/L (ref 39–117)
ALT SERPL-CCNC: 13 U/L (ref 1–33)
AST SERPL-CCNC: 17 U/L (ref 1–32)
BASOPHILS # BLD AUTO: 0.03 10*3/MM3 (ref 0–0.2)
BASOPHILS NFR BLD AUTO: 0.8 % (ref 0–1.5)
BILIRUB SERPL-MCNC: 0.4 MG/DL (ref 0–1.2)
BUN SERPL-MCNC: 23 MG/DL (ref 8–23)
BUN/CREAT SERPL: 25.3 (ref 7–25)
CALCIUM SERPL-MCNC: 9 MG/DL (ref 8.2–9.6)
CHLORIDE SERPL-SCNC: 108 MMOL/L (ref 98–107)
CHOLEST SERPL-MCNC: 169 MG/DL (ref 0–200)
CO2 SERPL-SCNC: 26.4 MMOL/L (ref 22–29)
CREAT SERPL-MCNC: 0.91 MG/DL (ref 0.57–1)
EGFRCR SERPLBLD CKD-EPI 2021: 59.7 ML/MIN/1.73
EOSINOPHIL # BLD AUTO: 0.1 10*3/MM3 (ref 0–0.4)
EOSINOPHIL NFR BLD AUTO: 2.6 % (ref 0.3–6.2)
ERYTHROCYTE [DISTWIDTH] IN BLOOD BY AUTOMATED COUNT: 13.4 % (ref 12.3–15.4)
GLOBULIN SER CALC-MCNC: 2.3 GM/DL
GLUCOSE SERPL-MCNC: 86 MG/DL (ref 65–99)
HCT VFR BLD AUTO: 39.5 % (ref 34–46.6)
HDLC SERPL-MCNC: 63 MG/DL (ref 40–60)
HGB BLD-MCNC: 13 G/DL (ref 12–15.9)
IMM GRANULOCYTES # BLD AUTO: 0 10*3/MM3 (ref 0–0.05)
IMM GRANULOCYTES NFR BLD AUTO: 0 % (ref 0–0.5)
LDLC SERPL CALC-MCNC: 92 MG/DL (ref 0–100)
LDLC/HDLC SERPL: 1.45 {RATIO}
LYMPHOCYTES # BLD AUTO: 0.73 10*3/MM3 (ref 0.7–3.1)
LYMPHOCYTES NFR BLD AUTO: 19.2 % (ref 19.6–45.3)
MCH RBC QN AUTO: 29.5 PG (ref 26.6–33)
MCHC RBC AUTO-ENTMCNC: 32.9 G/DL (ref 31.5–35.7)
MCV RBC AUTO: 89.8 FL (ref 79–97)
MONOCYTES # BLD AUTO: 0.52 10*3/MM3 (ref 0.1–0.9)
MONOCYTES NFR BLD AUTO: 13.6 % (ref 5–12)
NEUTROPHILS # BLD AUTO: 2.43 10*3/MM3 (ref 1.7–7)
NEUTROPHILS NFR BLD AUTO: 63.8 % (ref 42.7–76)
NRBC BLD AUTO-RTO: 0 /100 WBC (ref 0–0.2)
PLATELET # BLD AUTO: 142 10*3/MM3 (ref 140–450)
POTASSIUM SERPL-SCNC: 4.3 MMOL/L (ref 3.5–5.2)
PROT SERPL-MCNC: 6.2 G/DL (ref 6–8.5)
RBC # BLD AUTO: 4.4 10*6/MM3 (ref 3.77–5.28)
SODIUM SERPL-SCNC: 143 MMOL/L (ref 136–145)
TRIGL SERPL-MCNC: 72 MG/DL (ref 0–150)
TSH SERPL DL<=0.005 MIU/L-ACNC: 0.14 UIU/ML (ref 0.27–4.2)
VLDLC SERPL CALC-MCNC: 14 MG/DL (ref 5–40)
WBC # BLD AUTO: 3.81 10*3/MM3 (ref 3.4–10.8)

## 2024-03-26 RX ORDER — GABAPENTIN 100 MG/1
100 CAPSULE ORAL 4 TIMES DAILY
Qty: 120 CAPSULE | Refills: 0 | OUTPATIENT
Start: 2024-03-26

## 2024-03-26 NOTE — TELEPHONE ENCOUNTER
Rx Refill Note  Requested Prescriptions     Pending Prescriptions Disp Refills    gabapentin (NEURONTIN) 100 MG capsule [Pharmacy Med Name: GABAPENTIN 100MG CAPSULES] 120 capsule 0     Sig: TAKE 1 CAPSULE BY MOUTH FOUR TIMES DAILY      Last office visit with prescribing clinician: 12/14/2023   Last telemedicine visit with prescribing clinician: Visit date not found   Next office visit with prescribing clinician: 3/28/2024                         Would you like a call back once the refill request has been completed: [] Yes [] No    If the office needs to give you a call back, can they leave a voicemail: [] Yes [] No    Buzz Esqueda CMA/LMR  03/26/24, 08:43 EDT

## 2024-03-28 ENCOUNTER — OFFICE VISIT (OUTPATIENT)
Dept: FAMILY MEDICINE CLINIC | Facility: CLINIC | Age: 89
End: 2024-03-28
Payer: MEDICARE

## 2024-03-28 VITALS
SYSTOLIC BLOOD PRESSURE: 114 MMHG | TEMPERATURE: 97.8 F | WEIGHT: 131 LBS | OXYGEN SATURATION: 99 % | BODY MASS INDEX: 22.36 KG/M2 | HEART RATE: 84 BPM | DIASTOLIC BLOOD PRESSURE: 78 MMHG | HEIGHT: 64 IN

## 2024-03-28 DIAGNOSIS — Z00.00 MEDICARE ANNUAL WELLNESS VISIT, SUBSEQUENT: Primary | ICD-10-CM

## 2024-03-28 DIAGNOSIS — E03.8 OTHER SPECIFIED HYPOTHYROIDISM: ICD-10-CM

## 2024-03-28 DIAGNOSIS — H61.22 IMPACTED CERUMEN OF LEFT EAR: ICD-10-CM

## 2024-03-28 DIAGNOSIS — Z51.81 THERAPEUTIC DRUG MONITORING: ICD-10-CM

## 2024-03-28 DIAGNOSIS — R41.3 SHORT-TERM MEMORY LOSS: ICD-10-CM

## 2024-03-28 DIAGNOSIS — S46.911D STRAIN OF RIGHT SHOULDER, SUBSEQUENT ENCOUNTER: ICD-10-CM

## 2024-03-28 PROBLEM — S46.911A STRAIN OF RIGHT SHOULDER: Status: ACTIVE | Noted: 2024-03-28

## 2024-03-28 RX ORDER — LEVOTHYROXINE SODIUM 112 UG/1
112 TABLET ORAL DAILY
Qty: 90 TABLET | Refills: 1 | Status: SHIPPED | OUTPATIENT
Start: 2024-03-28

## 2024-03-28 NOTE — PROGRESS NOTES
The ABCs of the Annual Wellness Visit  Subsequent Medicare Wellness Visit  Chief complaint, Medicare wellness visit and hypothyroidism, memory.  Subjective      Barbie Hernandez is a 89 y.o. female who presents for a Subsequent Medicare Wellness Visit.    Hypothyroidism: Symptoms of fatigue, adherent with levothyroxine 125 mcg daily.  No adverse effects.  Reviewed recent labs today which shows supratherapeutic levels.    We also discussed memory changes which have occurred in a stepwise fashion, likely vascular but not wanting to take more medications especially aspirin or statins at this time.  During further evaluation but she declined    She also has right shoulder range of motion difficulties, she has had 2 injections in the past with improvement but she was having pain, currently she is not having pain that is limiting her range of motion.    The following portions of the patient's history were reviewed and   updated as appropriate: allergies, current medications, past family history, past medical history, past social history, past surgical history, and problem list.    Compared to one year ago, the patient feels her physical   health is worse.    Compared to one year ago, the patient feels her mental   health is worse.    Recent Hospitalizations:  She was not admitted to the hospital during the last year.       Current Medical Providers:  Patient Care Team:  Tika Perry MD as PCP - General (Family Medicine)  Lucia Pascual MD as Referring Physician (Obstetrics and Gynecology)    Outpatient Medications Prior to Visit   Medication Sig Dispense Refill    bevacizumab (AVASTIN) 100 MG/4ML chemo injection Infuse  into a venous catheter. Every 6 to 7 weeks only in right eye per patient      CALCIUM PO Take  by mouth.      carvedilol (COREG) 3.125 MG tablet Take 1 tablet by mouth 2 (Two) Times a Day.      gabapentin (NEURONTIN) 100 MG capsule Take 1 capsule by mouth 4 (Four) Times a Day. 120 capsule 0     "latanoprost (XALATAN) 0.005 % ophthalmic solution       valsartan (DIOVAN) 40 MG tablet Take 1 tablet by mouth Daily.      Zoledronic Acid (RECLAST IV) Infuse  into a venous catheter. Yearly      levothyroxine (SYNTHROID, LEVOTHROID) 125 MCG tablet TAKE 1 TABLET BY MOUTH DAILY 90 tablet 3     No facility-administered medications prior to visit.       No opioid medication identified on active medication list. I have reviewed chart for other potential  high risk medication/s and harmful drug interactions in the elderly.        Aspirin is not on active medication list.  Aspirin use is contraindicated for this patient due to: previous side effects, i.e. bruising, etc.  .    Patient Active Problem List   Diagnosis    Other specified hypothyroidism    Paroxysmal atrial fibrillation    PPD positive    Lower back pain    Osteoporosis    Advanced atrophic nonexudative age-related macular degeneration of right eye with subfoveal involvement    Cardiomyopathy    Congestive heart failure    Diastolic dysfunction    Scoliosis    Tachycardia-bradycardia    Spondylolisthesis of lumbar region    Arthropathy of right shoulder    Senile osteoporosis    Strain of right shoulder    Therapeutic drug monitoring    Short-term memory loss     Advance Care Planning   Advance Care Planning     Advance Directive is not on file.  ACP discussion was held with the patient during this visit. Patient has an advance directive (not in EMR), copy requested.     Objective    Vitals:    03/28/24 0956   BP: 114/78   Pulse: 84   Temp: 97.8 °F (36.6 °C)   SpO2: 99%   Weight: 59.4 kg (131 lb)   Height: 162.6 cm (64\")   PainSc: 0-No pain     Estimated body mass index is 22.49 kg/m² as calculated from the following:    Height as of this encounter: 162.6 cm (64\").    Weight as of this encounter: 59.4 kg (131 lb).    BMI is within normal parameters. No other follow-up for BMI required.    Physical Exam  Vitals and nursing note reviewed.   Constitutional:       " General: She is not in acute distress.     Appearance: Normal appearance. She is well-developed.      Comments: Ambulating with a cane   HENT:      Head: Normocephalic.      Right Ear: Tympanic membrane and ear canal normal. There is no impacted cerumen.      Left Ear: Tympanic membrane and ear canal normal. There is impacted cerumen.      Ears:      Comments: Cerumen removed manually by me with curette.  No complications tolerated well.     Nose: Nose normal.   Eyes:      Conjunctiva/sclera: Conjunctivae normal.      Pupils: Pupils are equal, round, and reactive to light.   Neck:      Vascular: No carotid bruit.   Cardiovascular:      Rate and Rhythm: Normal rate and regular rhythm.      Heart sounds: Normal heart sounds. No murmur heard.  Pulmonary:      Effort: Pulmonary effort is normal. No respiratory distress.      Breath sounds: Normal breath sounds.   Abdominal:      General: Abdomen is flat. Bowel sounds are normal. There is no distension.      Tenderness: There is no abdominal tenderness.   Musculoskeletal:         General: Normal range of motion.      Comments: Able to raise her left arm with normal range of motion but the right arm stops at around 90 degrees both in the forward flexion and abduction planes.   Skin:     General: Skin is warm and dry.      Findings: No rash.   Neurological:      Mental Status: She is alert and oriented to person, place, and time.   Psychiatric:         Behavior: Behavior normal.         Thought Content: Thought content normal.         Judgment: Judgment normal.           Does the patient have evidence of cognitive impairment?   Yes: patient does not want more testing     Lab Results   Component Value Date    CHLPL 169 03/25/2024    TRIG 72 03/25/2024    HDL 63 (H) 03/25/2024    LDL 92 03/25/2024    VLDL 14 03/25/2024      Comprehensive metabolic panel (03/25/2024 10:07)  Lipid Panel With LDL/HDL Ratio (03/25/2024 10:07)  CBC w AUTO Differential (03/25/2024 10:07)  TSH  (03/25/2024 10:07)      HEALTH RISK ASSESSMENT    Smoking Status:  Social History     Tobacco Use   Smoking Status Never   Smokeless Tobacco Never     Alcohol Consumption:  Social History     Substance and Sexual Activity   Alcohol Use No     Fall Risk Screen:    JHONNY Fall Risk Assessment was completed, and patient is at MODERATE risk for falls. Assessment completed on:3/28/2024    Depression Screening:      3/28/2024     9:59 AM   PHQ-2/PHQ-9 Depression Screening   Little Interest or Pleasure in Doing Things 0-->not at all   Feeling Down, Depressed or Hopeless 0-->not at all   PHQ-9: Brief Depression Severity Measure Score 0       Health Habits and Functional and Cognitive Screening:      3/28/2024    10:00 AM   Functional & Cognitive Status   Do you have difficulty preparing food and eating? No   Do you have difficulty bathing yourself, getting dressed or grooming yourself? No   Do you have difficulty using the toilet? No   Do you have difficulty moving around from place to place? Yes   Do you have trouble with steps or getting out of a bed or a chair? Yes   Current Diet Well Balanced Diet   Dental Exam Up to date   Eye Exam Up to date   Exercise (times per week) 2 times per week   Current Exercises Include Walking   Do you need help using the phone?  No   Are you deaf or do you have serious difficulty hearing?  Yes   Do you need help to go to places out of walking distance? No   Do you need help shopping? No   Do you need help preparing meals?  Yes   Do you need help with housework?  No   Do you need help with laundry? No   Do you need help taking your medications? No   Do you need help managing money? No   Do you ever drive or ride in a car without wearing a seat belt? No   Have you felt unusual stress, anger or loneliness in the last month? No   Who do you live with? Alone   If you need help, do you have trouble finding someone available to you? No   Have you been bothered in the last four weeks by sexual  problems? No   Do you have difficulty concentrating, remembering or making decisions? No       Age-appropriate Screening Schedule:  Refer to the list below for future screening recommendations based on patient's age, sex and/or medical conditions. Orders for these recommended tests are listed in the plan section. The patient has been provided with a written plan.    Health Maintenance   Topic Date Due    TDAP/TD VACCINES (1 - Tdap) Never done    RSV Vaccine - Adults (1 - 1-dose 60+ series) Never done    DXA SCAN  06/21/2024    ANNUAL WELLNESS VISIT  03/28/2025    COVID-19 Vaccine  Completed    INFLUENZA VACCINE  Completed    Pneumococcal Vaccine 65+  Completed    LIPID PANEL  Discontinued    ZOSTER VACCINE  Discontinued                  CMS Preventative Services Quick Reference  Risk Factors Identified During Encounter:    Dental Screening Recommended  Vision Screening Recommended    The above risks/problems have been discussed with the patient.  Pertinent information has been shared with the patient in the After Visit Summary.    Diagnoses and all orders for this visit:    1. Medicare annual wellness visit, subsequent (Primary)    2. Other specified hypothyroidism  -     levothyroxine (SYNTHROID, LEVOTHROID) 112 MCG tablet; Take 1 tablet by mouth Daily.  Dispense: 90 tablet; Refill: 1    3. Therapeutic drug monitoring  -     ToxASSURE Select 13 (MW) - Urine, Clean Catch  -     Urinalysis With Microscopic - Urine, Clean Catch    4. Impacted cerumen of left ear    5. Strain of right shoulder, subsequent encounter    6. Short-term memory loss        Here for annual exam, fasting labs reviewed with patient, immunizations up-to-date, colon cancer screening not indicated, GYN health she has seen gynecology Dr. Pascual, cardiovascular screening up-to-date, counseled patient to increase vegetable intake, water and 150 minutes of exercise weekly combining weightbearing exercises and aerobic activity.    R shoulder with on  going issues, she has had 2 injections on this leg in the past requesting 1 today.  She is having range of motion difficulty and weakness but no pain.  We discussed that I really would not recommend an injection if she is not having pain as it could cause further tendon weakness.  We discussed considering physical therapy which she declined at this time.    In terms of hypothyroidism not well-controlled decrease levothyroxine from 125 to 112 mcg as above.  Follow-up in 3 months.    Cerumen impaction the left side removed manually by me no complications, hearing improved    In terms of memory concerns, it does seem to be in a stepwise approach which is is inconsistent with the vascular dementia which would be expected especially with her history of A-fib.  We discussed blood thinners and statins and at this time she does not want to take more medication.  Continue follow-up with cardiology.  I am always happy to look into things but she will let me know if she changes her mind.  Her goals right now are to maintain quality of care and to reduce medical burden.    Follow Up:   Next Medicare Wellness visit to be scheduled in 1 year.    Return in about 3 months (around 6/28/2024), or if symptoms worsen or fail to improve, for Recheck thyroid and back pain..    An After Visit Summary and PPPS were made available to the patient.

## 2024-04-03 ENCOUNTER — HOSPITAL ENCOUNTER (OUTPATIENT)
Facility: HOSPITAL | Age: 89
Setting detail: OBSERVATION
Discharge: HOME | End: 2024-04-05
Attending: EMERGENCY MEDICINE | Admitting: INTERNAL MEDICINE
Payer: MEDICARE

## 2024-04-03 ENCOUNTER — APPOINTMENT (OUTPATIENT)
Dept: CT IMAGING | Facility: HOSPITAL | Age: 89
End: 2024-04-03
Payer: MEDICARE

## 2024-04-03 DIAGNOSIS — R07.9 RIGHT-SIDED CHEST PAIN: Primary | ICD-10-CM

## 2024-04-03 DIAGNOSIS — Z86.79 HISTORY OF CORONARY ARTERY DISEASE: ICD-10-CM

## 2024-04-03 DIAGNOSIS — Z86.79 HISTORY OF ATRIAL FIBRILLATION: ICD-10-CM

## 2024-04-03 DIAGNOSIS — Z87.39 HISTORY OF SCOLIOSIS: ICD-10-CM

## 2024-04-03 LAB
ALBUMIN SERPL-MCNC: 3.6 G/DL (ref 3.5–5.2)
ALBUMIN/GLOB SERPL: 1.2 G/DL
ALP SERPL-CCNC: 58 U/L (ref 39–117)
ALT SERPL W P-5'-P-CCNC: 13 U/L (ref 1–33)
ANION GAP SERPL CALCULATED.3IONS-SCNC: 8.8 MMOL/L (ref 5–15)
AST SERPL-CCNC: 19 U/L (ref 1–32)
BASOPHILS # BLD AUTO: 0.03 10*3/MM3 (ref 0–0.2)
BASOPHILS NFR BLD AUTO: 0.7 % (ref 0–1.5)
BILIRUB SERPL-MCNC: 0.5 MG/DL (ref 0–1.2)
BILIRUB UR QL STRIP: NEGATIVE
BUN SERPL-MCNC: 17 MG/DL (ref 8–23)
BUN/CREAT SERPL: 20.5 (ref 7–25)
CALCIUM SPEC-SCNC: 8.8 MG/DL (ref 8.2–9.6)
CHLORIDE SERPL-SCNC: 107 MMOL/L (ref 98–107)
CLARITY UR: CLEAR
CO2 SERPL-SCNC: 24.2 MMOL/L (ref 22–29)
COLOR UR: YELLOW
CREAT SERPL-MCNC: 0.83 MG/DL (ref 0.57–1)
D-LACTATE SERPL-SCNC: 1.1 MMOL/L (ref 0.5–2)
DEPRECATED RDW RBC AUTO: 42.9 FL (ref 37–54)
EGFRCR SERPLBLD CKD-EPI 2021: 66.6 ML/MIN/1.73
EOSINOPHIL # BLD AUTO: 0.1 10*3/MM3 (ref 0–0.4)
EOSINOPHIL NFR BLD AUTO: 2.2 % (ref 0.3–6.2)
ERYTHROCYTE [DISTWIDTH] IN BLOOD BY AUTOMATED COUNT: 13.2 % (ref 12.3–15.4)
GLOBULIN UR ELPH-MCNC: 2.9 GM/DL
GLUCOSE SERPL-MCNC: 90 MG/DL (ref 65–99)
GLUCOSE UR STRIP-MCNC: NEGATIVE MG/DL
HCT VFR BLD AUTO: 38.5 % (ref 34–46.6)
HGB BLD-MCNC: 13 G/DL (ref 12–15.9)
HGB UR QL STRIP.AUTO: NEGATIVE
HOLD SPECIMEN: NORMAL
HOLD SPECIMEN: NORMAL
IMM GRANULOCYTES # BLD AUTO: 0.01 10*3/MM3 (ref 0–0.05)
IMM GRANULOCYTES NFR BLD AUTO: 0.2 % (ref 0–0.5)
KETONES UR QL STRIP: ABNORMAL
LEUKOCYTE ESTERASE UR QL STRIP.AUTO: NEGATIVE
LIPASE SERPL-CCNC: 26 U/L (ref 13–60)
LYMPHOCYTES # BLD AUTO: 0.8 10*3/MM3 (ref 0.7–3.1)
LYMPHOCYTES NFR BLD AUTO: 17.9 % (ref 19.6–45.3)
MCH RBC QN AUTO: 29.7 PG (ref 26.6–33)
MCHC RBC AUTO-ENTMCNC: 33.8 G/DL (ref 31.5–35.7)
MCV RBC AUTO: 88.1 FL (ref 79–97)
MONOCYTES # BLD AUTO: 0.66 10*3/MM3 (ref 0.1–0.9)
MONOCYTES NFR BLD AUTO: 14.8 % (ref 5–12)
NEUTROPHILS NFR BLD AUTO: 2.86 10*3/MM3 (ref 1.7–7)
NEUTROPHILS NFR BLD AUTO: 64.2 % (ref 42.7–76)
NITRITE UR QL STRIP: NEGATIVE
NRBC BLD AUTO-RTO: 0 /100 WBC (ref 0–0.2)
PH UR STRIP.AUTO: 8.5 [PH] (ref 5–8)
PLATELET # BLD AUTO: 145 10*3/MM3 (ref 140–450)
PMV BLD AUTO: 10.4 FL (ref 6–12)
POTASSIUM SERPL-SCNC: 4.4 MMOL/L (ref 3.5–5.2)
PROCALCITONIN SERPL-MCNC: 0.03 NG/ML (ref 0–0.25)
PROCALCITONIN SERPL-MCNC: 0.03 NG/ML (ref 0–0.25)
PROT SERPL-MCNC: 6.5 G/DL (ref 6–8.5)
PROT UR QL STRIP: NEGATIVE
QT INTERVAL: 425 MS
QTC INTERVAL: 520 MS
RBC # BLD AUTO: 4.37 10*6/MM3 (ref 3.77–5.28)
SODIUM SERPL-SCNC: 140 MMOL/L (ref 136–145)
SP GR UR STRIP: >=1.03 (ref 1–1.03)
TROPONIN T SERPL HS-MCNC: 15 NG/L
TROPONIN T SERPL HS-MCNC: 16 NG/L
UROBILINOGEN UR QL STRIP: ABNORMAL
WBC NRBC COR # BLD AUTO: 4.46 10*3/MM3 (ref 3.4–10.8)
WHOLE BLOOD HOLD COAG: NORMAL
WHOLE BLOOD HOLD SPECIMEN: NORMAL

## 2024-04-03 PROCEDURE — G0378 HOSPITAL OBSERVATION PER HR: HCPCS

## 2024-04-03 PROCEDURE — 84145 PROCALCITONIN (PCT): CPT | Performed by: EMERGENCY MEDICINE

## 2024-04-03 PROCEDURE — 85025 COMPLETE CBC W/AUTO DIFF WBC: CPT | Performed by: EMERGENCY MEDICINE

## 2024-04-03 PROCEDURE — 93010 ELECTROCARDIOGRAM REPORT: CPT | Performed by: INTERNAL MEDICINE

## 2024-04-03 PROCEDURE — 81003 URINALYSIS AUTO W/O SCOPE: CPT | Performed by: EMERGENCY MEDICINE

## 2024-04-03 PROCEDURE — 83605 ASSAY OF LACTIC ACID: CPT | Performed by: EMERGENCY MEDICINE

## 2024-04-03 PROCEDURE — 80053 COMPREHEN METABOLIC PANEL: CPT | Performed by: EMERGENCY MEDICINE

## 2024-04-03 PROCEDURE — 99285 EMERGENCY DEPT VISIT HI MDM: CPT

## 2024-04-03 PROCEDURE — 84484 ASSAY OF TROPONIN QUANT: CPT | Performed by: EMERGENCY MEDICINE

## 2024-04-03 PROCEDURE — 74177 CT ABD & PELVIS W/CONTRAST: CPT

## 2024-04-03 PROCEDURE — 83690 ASSAY OF LIPASE: CPT | Performed by: EMERGENCY MEDICINE

## 2024-04-03 PROCEDURE — 36415 COLL VENOUS BLD VENIPUNCTURE: CPT | Performed by: EMERGENCY MEDICINE

## 2024-04-03 PROCEDURE — 93005 ELECTROCARDIOGRAM TRACING: CPT | Performed by: EMERGENCY MEDICINE

## 2024-04-03 PROCEDURE — 25510000001 IOPAMIDOL 61 % SOLUTION: Performed by: EMERGENCY MEDICINE

## 2024-04-03 RX ORDER — ACETAMINOPHEN 325 MG/1
650 TABLET ORAL EVERY 4 HOURS PRN
Status: DISCONTINUED | OUTPATIENT
Start: 2024-04-03 | End: 2024-04-05 | Stop reason: HOSPADM

## 2024-04-03 RX ORDER — VALSARTAN 40 MG/1
40 TABLET ORAL DAILY
Status: DISCONTINUED | OUTPATIENT
Start: 2024-04-04 | End: 2024-04-05 | Stop reason: HOSPADM

## 2024-04-03 RX ORDER — BISACODYL 10 MG
10 SUPPOSITORY, RECTAL RECTAL DAILY PRN
Status: DISCONTINUED | OUTPATIENT
Start: 2024-04-03 | End: 2024-04-05 | Stop reason: HOSPADM

## 2024-04-03 RX ORDER — AMOXICILLIN 250 MG
2 CAPSULE ORAL 2 TIMES DAILY PRN
Status: DISCONTINUED | OUTPATIENT
Start: 2024-04-03 | End: 2024-04-05 | Stop reason: HOSPADM

## 2024-04-03 RX ORDER — ONDANSETRON 2 MG/ML
4 INJECTION INTRAMUSCULAR; INTRAVENOUS EVERY 6 HOURS PRN
Status: DISCONTINUED | OUTPATIENT
Start: 2024-04-03 | End: 2024-04-04

## 2024-04-03 RX ORDER — SODIUM CHLORIDE 0.9 % (FLUSH) 0.9 %
10 SYRINGE (ML) INJECTION AS NEEDED
Status: DISCONTINUED | OUTPATIENT
Start: 2024-04-03 | End: 2024-04-05 | Stop reason: HOSPADM

## 2024-04-03 RX ORDER — BISACODYL 5 MG/1
5 TABLET, DELAYED RELEASE ORAL DAILY PRN
Status: DISCONTINUED | OUTPATIENT
Start: 2024-04-03 | End: 2024-04-05 | Stop reason: HOSPADM

## 2024-04-03 RX ORDER — HYDROCODONE BITARTRATE AND ACETAMINOPHEN 5; 325 MG/1; MG/1
1 TABLET ORAL ONCE
Status: COMPLETED | OUTPATIENT
Start: 2024-04-03 | End: 2024-04-03

## 2024-04-03 RX ORDER — CARVEDILOL 3.12 MG/1
3.12 TABLET ORAL 2 TIMES DAILY
Status: DISCONTINUED | OUTPATIENT
Start: 2024-04-03 | End: 2024-04-05 | Stop reason: HOSPADM

## 2024-04-03 RX ORDER — HYDROCODONE BITARTRATE AND ACETAMINOPHEN 5; 325 MG/1; MG/1
1 TABLET ORAL EVERY 6 HOURS PRN
Status: DISCONTINUED | OUTPATIENT
Start: 2024-04-03 | End: 2024-04-05 | Stop reason: HOSPADM

## 2024-04-03 RX ORDER — GABAPENTIN 100 MG/1
100 CAPSULE ORAL 4 TIMES DAILY
Status: DISCONTINUED | OUTPATIENT
Start: 2024-04-03 | End: 2024-04-05 | Stop reason: HOSPADM

## 2024-04-03 RX ORDER — ONDANSETRON 4 MG/1
4 TABLET, ORALLY DISINTEGRATING ORAL EVERY 6 HOURS PRN
Status: DISCONTINUED | OUTPATIENT
Start: 2024-04-03 | End: 2024-04-04

## 2024-04-03 RX ORDER — UREA 10 %
3 LOTION (ML) TOPICAL NIGHTLY PRN
Status: DISCONTINUED | OUTPATIENT
Start: 2024-04-03 | End: 2024-04-05 | Stop reason: HOSPADM

## 2024-04-03 RX ORDER — LEVOTHYROXINE SODIUM 112 UG/1
112 TABLET ORAL DAILY
Status: DISCONTINUED | OUTPATIENT
Start: 2024-04-04 | End: 2024-04-05 | Stop reason: HOSPADM

## 2024-04-03 RX ORDER — LIDOCAINE 4 G/G
1 PATCH TOPICAL
Status: DISCONTINUED | OUTPATIENT
Start: 2024-04-04 | End: 2024-04-05 | Stop reason: HOSPADM

## 2024-04-03 RX ORDER — POLYETHYLENE GLYCOL 3350 17 G/17G
17 POWDER, FOR SOLUTION ORAL DAILY PRN
Status: DISCONTINUED | OUTPATIENT
Start: 2024-04-03 | End: 2024-04-05 | Stop reason: HOSPADM

## 2024-04-03 RX ADMIN — GABAPENTIN 100 MG: 100 CAPSULE ORAL at 21:55

## 2024-04-03 RX ADMIN — CARVEDILOL 3.12 MG: 3.12 TABLET, FILM COATED ORAL at 21:55

## 2024-04-03 RX ADMIN — IOPAMIDOL 85 ML: 612 INJECTION, SOLUTION INTRAVENOUS at 15:56

## 2024-04-03 RX ADMIN — HYDROCODONE BITARTRATE AND ACETAMINOPHEN 1 TABLET: 5; 325 TABLET ORAL at 17:55

## 2024-04-03 NOTE — ED TRIAGE NOTES
Patient to ED via [facility] from home. Patient c/o RUQ abdominal pain that began last night at 1700. Patient reports walking makes pain worse.   
I will STOP taking the medications listed below when I get home from the hospital:  None

## 2024-04-03 NOTE — ED NOTES
"Nursing ED/EMS Transfer  Barbie Hernandez  89 y.o.  female    HPI :   Chief Complaint   Patient presents with    Abdominal Pain       Admitting doctor:   Alana Spencer MD    Admitting diagnosis:   The primary encounter diagnosis was Right-sided chest pain. Diagnoses of History of atrial fibrillation, History of coronary artery disease, and History of scoliosis were also pertinent to this visit.    Code status:   Current Code Status       Date Active Code Status Order ID Comments User Context       4/3/2024 1750 CPR (Attempt to Resuscitate) [id number]  Alana Spencer MD ED        Question Answer    Code Status (Patient has no pulse and is not breathing) CPR (Attempt to Resuscitate)    Medical Interventions (Patient has pulse or is breathing) Full                    Allergies:   Ciprofloxacin, Naproxen, Sulfa antibiotics, Theophylline, and Vancomycin    Isolation:   No active isolations    Intake and Output  No intake or output data in the 24 hours ending 04/03/24 1759    Weight:       04/03/24  1336   Weight: 59.4 kg (130 lb 15.3 oz)       Most recent vitals:   Vitals:    04/03/24 1326 04/03/24 1336 04/03/24 1636   BP:  138/87 140/78   Pulse: 95  91   Resp: 16     Temp: 98.3 °F (36.8 °C)     SpO2: 98%  94%   Weight:  59.4 kg (130 lb 15.3 oz)    Height:  162.6 cm (64\")        Active LDAs/IV Access:   Lines, Drains & Airways       Active LDAs       Name Placement date Placement time Site Days    Peripheral IV 04/03/24 1547 Anterior;Left Forearm 04/03/24  1547  Forearm  less than 1                    Labs (abnormal labs have a star):   Labs Reviewed   URINALYSIS W/ MICROSCOPIC IF INDICATED (NO CULTURE) - Abnormal; Notable for the following components:       Result Value    pH, UA 8.5 (*)     Ketones, UA 15 mg/dL (1+) (*)     All other components within normal limits    Narrative:     Urine microscopic not indicated.   CBC WITH AUTO DIFFERENTIAL - Abnormal; Notable for the following components:    " "Lymphocyte % 17.9 (*)     Monocyte % 14.8 (*)     All other components within normal limits   SINGLE HS TROPONIN T - Abnormal; Notable for the following components:    HS Troponin T 15 (*)     All other components within normal limits    Narrative:     High Sensitive Troponin T Reference Range:  <14.0 ng/L- Negative Female for AMI  <22.0 ng/L- Negative Male for AMI  >=14 - Abnormal Female indicating possible myocardial injury.  >=22 - Abnormal Male indicating possible myocardial injury.   Clinicians would have to utilize clinical acumen, EKG, Troponin, and serial changes to determine if it is an Acute Myocardial Infarction or myocardial injury due to an underlying chronic condition.        LIPASE - Normal   PROCALCITONIN - Normal    Narrative:     As a Marker for Sepsis (Non-Neonates):    1. <0.5 ng/mL represents a low risk of severe sepsis and/or septic shock.  2. >2 ng/mL represents a high risk of severe sepsis and/or septic shock.    As a Marker for Lower Respiratory Tract Infections that require antibiotic therapy:    PCT on Admission    Antibiotic Therapy       6-12 Hrs later    >0.5                Strongly Recommended  >0.25 - <0.5        Recommended   0.1 - 0.25          Discouraged              Remeasure/reassess PCT  <0.1                Strongly Discouraged     Remeasure/reassess PCT    As 28 day mortality risk marker: \"Change in Procalcitonin Result\" (>80% or <=80%) if Day 0 (or Day 1) and Day 4 values are available. Refer to http://www.[employer]-pct-calculator.com    Change in PCT <=80%  A decrease of PCT levels below or equal to 80% defines a positive change in PCT test result representing a higher risk for 28-day all-cause mortality of patients diagnosed with severe sepsis for septic shock.    Change in PCT >80%  A decrease of PCT levels of more than 80% defines a negative change in PCT result representing a lower risk for 28-day all-cause mortality of patients diagnosed with severe sepsis or septic shock. "      LACTIC ACID, PLASMA - Normal   RAINBOW DRAW    Narrative:     The following orders were created for panel order [place] Draw.  Procedure                               Abnormality         Status                     ---------                               -----------         ------                     Green Top (Gel)[[id number]]                                  Final result               Lavender Top[[id number]]                                     Final result               Gold Top - SST[[id number]]                                   Final result               Light Blue Top[[id number]]                                   Final result                 Please view results for these tests on the individual orders.   COMPREHENSIVE METABOLIC PANEL    Narrative:     GFR Normal >60  Chronic Kidney Disease <60  Kidney Failure <15    The GFR formula is only valid for adults with stable renal function between ages 18 and 70.   PROCALCITONIN   SINGLE HS TROPONIN T   CBC AND DIFFERENTIAL    Narrative:     The following orders were created for panel order CBC & Differential.  Procedure                               Abnormality         Status                     ---------                               -----------         ------                     CBC Auto Differential[[id number]]        Abnormal            Final result                 Please view results for these tests on the individual orders.   GREEN TOP   LAVENDER TOP   GOLD TOP - SST   LIGHT BLUE TOP       EKG:   ECG 12 Lead Chest Pain   Final Result   HEART RATE= 90  bpm   RR Interval= 667  ms   [place] Interval= 114  ms   P Horizontal Axis= -45  deg   P Front Axis= 92  deg   QRSD Interval= 154  ms   QT Interval= 425  ms   QTcB= 520  ms   QRS Axis= -65  deg   T Wave Axis= 60  deg   - ABNORMAL ECG -   A-V dual-paced rhythm with some inhibition   No further analysis attempted due to paced rhythm   No Prior Tracing for Comparison   Electronically Signed By: [facility])  03-Apr-2024 17:42:50   Date and Time of Study: 2024-04-03 14:49:58          Meds given in ED:   Medications   sodium chloride 0.9 % flush 10 mL (has no administration in time range)   acetaminophen (TYLENOL) tablet 650 mg (has no administration in time range)   ondansetron ODT (ZOFRAN-ODT) disintegrating tablet 4 mg (has no administration in time range)     Or   ondansetron (ZOFRAN) injection 4 mg (has no administration in time range)   melatonin tablet 3 mg (has no administration in time range)   sennosides-docusate (PERICOLACE) 8.6-50 MG per tablet 2 tablet (has no administration in time range)     And   polyethylene glycol (MIRALAX) packet 17 g (has no administration in time range)     And   bisacodyl (DULCOLAX) EC tablet 5 mg (has no administration in time range)     And   bisacodyl (DULCOLAX) suppository 10 mg (has no administration in time range)   iopamidol (ISOVUE-300) 61 % injection 100 mL (85 mL Intravenous Given 4/3/24 1556)   HYDROcodone-acetaminophen (NORCO) 5-325 MG per tablet 1 tablet (1 tablet Oral Given 4/3/24 1755)       Imaging results:  CT Abdomen Pelvis With Contrast    Result Date: 4/3/2024   1. No acute abnormality in the abdomen or pelvis. 2. Colonic diverticula, without CT evidence of diverticulitis. 3. Multifocal bibasilar subsegmental atelectasis and/or scarring with patchy predominantly peripheral groundglass opacities and interstitial thickening in each lung base that could represent superimposed chronic lung disease or atypical pneumonia. 4. Thoracolumbar scoliosis and spondylosis.  This report was finalized on 4/3/2024 4:21 PM by Nik Jaeger MD on Workstation: [id number]       Ambulatory status:   - AMBULATES      Social issues:   Social History     Socioeconomic History    Marital status:    Tobacco Use    Smoking status: Never    Smokeless tobacco: Never   Vaping Use    Vaping status: Never Used   Substance and Sexual Activity    Alcohol use: No    Drug use: No    Sexual  activity: Defer     Birth control/protection: Post-menopausal       NIH Stroke Scale:         Sonali Mckeon RN  04/03/24 17:59 EDT

## 2024-04-03 NOTE — PROGRESS NOTES
Clinical Pharmacy Services: Medication History    Barbie Hernandez is a 89 y.o. female presenting to [facility] for   Chief Complaint   Patient presents with    Abdominal Pain       She  has a past medical history of A-fib, Arthritis, Atrial fibrillation, Cancer, Coronary artery disease, Disease of thyroid gland, Hyperlipidemia, Low back pain, Macular degeneration, and Osteoporosis.    Allergies as of 04/03/2024 - Reviewed 04/03/2024   Allergen Reaction Noted    Ciprofloxacin Rash 04/18/2016    Naproxen Rash 03/09/2018    Sulfa antibiotics Nausea Only 04/18/2016    Theophylline Rash 09/25/2018    Vancomycin Rash 04/18/2016       Medication information was obtained from: Patient & Pharmacy   Pharmacy and Phone Number:     Prior to Admission Medications       Prescriptions Last Dose Informant Patient Reported? Taking?    bevacizumab (AVASTIN) 100 MG/4ML chemo injection  Self Yes Yes    Infuse 4 mL into a venous catheter See Admin Instructions. Every 6 to 7 weeks only in right eye    CALCIUM PO   Yes No    Take  by mouth.    carvedilol (COREG) 3.125 MG tablet  Self Yes Yes    Take 1 tablet by mouth 2 (Two) Times a Day.    gabapentin (NEURONTIN) 100 MG capsule  Self No Yes    Take 1 capsule by mouth 4 (Four) Times a Day.    latanoprost (XALATAN) 0.005 % ophthalmic solution   Yes No    levothyroxine (SYNTHROID, LEVOTHROID) 112 MCG tablet  Self No Yes    Take 1 tablet by mouth Daily.    valsartan (DIOVAN) 40 MG tablet  Self Yes Yes    Take 1 tablet by mouth Daily.    Zoledronic Acid (RECLAST IV)   Yes No    Infuse  into a venous catheter. Yearly              Medication notes:     This medication list is complete to the best of my knowledge as of 4/3/2024    Please call if questions.    John Pitts  Medication History Technician   [phone]    4/3/2024 17:17 EDT

## 2024-04-03 NOTE — ED PROVIDER NOTES
EMERGENCY DEPARTMENT ENCOUNTER    Room Number:  24/24  PCP: Tika Perry MD  Historian: Patient      HPI:  Chief Complaint: Right upper quadrant/chest pain  A complete HPI/ROS/PMH/PSH/SH/FH are unobtainable due to: None    Context: Barbie Hernandez is a 89 y.o. female who presents to the ED via private vehicle from home c/o acute onset of right upper quadrant/right anterior inferior chest pain that started last night and progressed today.  Symptoms resolved at rest when lying flat, worse when she stands up and moves around.  Chronic unchanged shortness of breath.  No recent new fevers, cough, vomiting, diarrhea.      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 degeneration of right eye with subfoveal involvement 05/20/2020    Cardiomyopathy 02/26/2015    Congestive heart failure 06/07/2017    Diastolic dysfunction 02/26/2015    Scoliosis 11/16/2016    Tachycardia-bradycardia 02/26/2015    Spondylolisthesis of lumbar region 03/18/2022    Arthropathy of right shoulder 05/18/2023    Senile osteoporosis 09/26/2023    Strain of right shoulder 03/28/2024    Therapeutic drug monitoring 03/28/2024    Short-term memory loss 03/28/2024     Resolved Ambulatory Problems     Diagnosis Date Noted    No Resolved Ambulatory Problems     Past Medical History:   Diagnosis Date    A-fib     Arthritis     Atrial fibrillation     Cancer     Coronary artery disease     Disease of thyroid gland     Hyperlipidemia     Low back pain     Macular degeneration          PAST SURGICAL HISTORY  Past Surgical History:   Procedure Laterality Date    CATARACT EXTRACTION  Bilateral     COLON RESECTION      INTESTINAL OBBSTRUCTION    EPIDURAL BLOCK      PACEMAKER IMPLANTATION      PYLOROMYOTOMY      UMBILICAL HERNIA REPAIR           FAMILY HISTORY  Family History   Problem Relation Age of Onset    Breast cancer Mother     OCD Mother         neurotic and recluse     Lung cancer Father     Diabetes type I Son     Breast cancer Maternal Grandmother     Breast cancer Other     Ovarian cancer Sister     Stroke Neg Hx     Heart attack Neg Hx          SOCIAL HISTORY  Social History     Socioeconomic History    Marital status:    Tobacco Use    Smoking status: Never    Smokeless tobacco: Never   Vaping Use    Vaping status: Never Used   Substance and Sexual Activity    Alcohol use: No    Drug use: No    Sexual activity: Defer     Birth control/protection: Post-menopausal         ALLERGIES  Ciprofloxacin, Naproxen, Sulfa antibiotics, Theophylline, and Vancomycin        REVIEW OF SYSTEMS  Review of Systems     All systems reviewed and negative except for those discussed in HPI.       PHYSICAL EXAM    I have reviewed the triage vital signs and nursing notes.    ED Triage Vitals   Temp Heart Rate Resp BP SpO2   04/03/24 1326 04/03/24 1326 04/03/24 1326 04/03/24 1336 04/03/24 1326   98.3 °F (36.8 °C) 95 16 138/87 98 %      Temp src Heart Rate Source Patient Position BP Location FiO2 (%)   -- -- -- -- --              Physical Exam  General: No acute distress, nontoxic  HEENT: Mucous membranes moist, atraumatic, EOMI  Neck: Full ROM  Pulm: Symmetric chest rise, nonlabored, lungs CTAB  Cardiovascular: Regular rate and rhythm, intact distal pulses  GI: Soft, nontender, nondistended, no rebound, no guarding, bowel sounds present  MSK: Full ROM, no deformity, unable to reproduce any tenderness to the chest wall with palpation  Skin: Warm, dry, no rash  Neuro: Awake, alert, oriented x 4, GCS 15, moving all extremities, no focal deficits  Psych: Calm, cooperative        LAB RESULTS  Recent Results  (from the past 24 hour(s))   Comprehensive Metabolic Panel    Collection Time: 04/03/24  1:45 PM    Specimen: Blood   Result Value Ref Range    Glucose 90 65 - 99 mg/dL    BUN 17 8 - 23 mg/dL    Creatinine 0.83 0.57 - 1.00 mg/dL    Sodium 140 136 - 145 mmol/L    Potassium 4.4 3.5 - 5.2 mmol/L    Chloride 107 98 - 107 mmol/L    CO2 24.2 22.0 - 29.0 mmol/L    Calcium 8.8 8.2 - 9.6 mg/dL    Total Protein 6.5 6.0 - 8.5 g/dL    Albumin 3.6 3.5 - 5.2 g/dL    ALT (SGPT) 13 1 - 33 U/L    AST (SGOT) 19 1 - 32 U/L    Alkaline Phosphatase 58 39 - 117 U/L    Total Bilirubin 0.5 0.0 - 1.2 mg/dL    Globulin 2.9 gm/dL    A/G Ratio 1.2 g/dL    BUN/Creatinine Ratio 20.5 7.0 - 25.0    Anion Gap 8.8 5.0 - 15.0 mmol/L    eGFR 66.6 >60.0 mL/min/1.73   Lipase    Collection Time: 04/03/24  1:45 PM    Specimen: Blood   Result Value Ref Range    Lipase 26 13 - 60 U/L   Green Top (Gel)    Collection Time: 04/03/24  1:45 PM   Result Value Ref Range    Extra Tube Hold for add-ons.    Lavender Top    Collection Time: 04/03/24  1:45 PM   Result Value Ref Range    Extra Tube hold for add-on    Gold Top - SST    Collection Time: 04/03/24  1:45 PM   Result Value Ref Range    Extra Tube Hold for add-ons.    Light Blue Top    Collection Time: 04/03/24  1:45 PM   Result Value Ref Range    Extra Tube Hold for add-ons.    CBC Auto Differential    Collection Time: 04/03/24  1:45 PM    Specimen: Blood   Result Value Ref Range    WBC 4.46 3.40 - 10.80 10*3/mm3    RBC 4.37 3.77 - 5.28 10*6/mm3    Hemoglobin 13.0 12.0 - 15.9 g/dL    Hematocrit 38.5 34.0 - 46.6 %    MCV 88.1 79.0 - 97.0 fL    MCH 29.7 26.6 - 33.0 pg    MCHC 33.8 31.5 - 35.7 g/dL    RDW 13.2 12.3 - 15.4 %    RDW-SD 42.9 37.0 - 54.0 fl    MPV 10.4 6.0 - 12.0 fL    Platelets 145 140 - 450 10*3/mm3    Neutrophil % 64.2 42.7 - 76.0 %    Lymphocyte % 17.9 (L) 19.6 - 45.3 %    Monocyte % 14.8 (H) 5.0 - 12.0 %    Eosinophil % 2.2 0.3 - 6.2 %    Basophil % 0.7 0.0 - 1.5 %    Immature Grans % 0.2 0.0 -  0.5 %    Neutrophils, Absolute 2.86 1.70 - 7.00 10*3/mm3    Lymphocytes, Absolute 0.80 0.70 - 3.10 10*3/mm3    Monocytes, Absolute 0.66 0.10 - 0.90 10*3/mm3    Eosinophils, Absolute 0.10 0.00 - 0.40 10*3/mm3    Basophils, Absolute 0.03 0.00 - 0.20 10*3/mm3    Immature Grans, Absolute 0.01 0.00 - 0.05 10*3/mm3    nRBC 0.0 0.0 - 0.2 /100 WBC   Single High Sensitivity Troponin T    Collection Time: 04/03/24  1:45 PM    Specimen: Blood   Result Value Ref Range    HS Troponin T 15 (H) <14 ng/L   Procalcitonin    Collection Time: 04/03/24  1:45 PM    Specimen: Blood   Result Value Ref Range    Procalcitonin 0.03 0.00 - 0.25 ng/mL   ECG 12 Lead Chest Pain    Collection Time: 04/03/24  2:49 PM   Result Value Ref Range    QT Interval 425 ms    QTC Interval 520 ms   Lactic Acid, Plasma    Collection Time: 04/03/24  5:12 PM    Specimen: Blood   Result Value Ref Range    Lactate 1.1 0.5 - 2.0 mmol/L   Urinalysis With Microscopic If Indicated (No Culture) - Urine, Clean Catch    Collection Time: 04/03/24  5:14 PM    Specimen: Urine, Clean Catch   Result Value Ref Range    Color, UA Yellow Yellow, Straw    Appearance, UA Clear Clear    pH, UA 8.5 (H) 5.0 - 8.0    Specific Gravity, UA >=1.030 1.005 - 1.030    Glucose, UA Negative Negative    Ketones, UA 15 mg/dL (1+) (A) Negative    Bilirubin, UA Negative Negative    Blood, UA Negative Negative    Protein, UA Negative Negative    Leuk Esterase, UA Negative Negative    Nitrite, UA Negative Negative    Urobilinogen, UA 0.2 E.U./dL 0.2 - 1.0 E.U./dL       Ordered the above labs and independently interpreted results. My findings will be discussed in the medical decision making section below        RADIOLOGY  CT Abdomen Pelvis With Contrast    Result Date: 4/3/2024  CT ABDOMEN PELVIS W CONTRAST-  Date of Exam: 4/3/2024 3:37 PM  Indication: RUQ pain.  Comparison Exams: None available.  Technique: Multiple contiguous axial images were acquired through the abdomen and pelvis following the  intravenous administration of 85 mL of Isovue-300. Reformatted coronal and sagittal sequences were also reviewed. Radiation dose reduction techniques were utilized, including automated exposure control and exposure modulation based on body size.  FINDINGS: Multifocal bibasilar subsegmental atelectasis and/or scarring with patchy predominantly peripheral groundglass opacities and interstitial thickening in each lung base that could represent superimposed chronic lung disease or atypical pneumonia. Calcified remnants of prior granulomatous disease in the right lung base.  Calcified remnants of prior granulomatous disease in the liver and spleen. Tiny subcentimeter low-density lesions in the liver, which are incompletely evaluated but statistically represent hepatic cysts and/or hemangiomas. The gallbladder, pancreas, adrenal glands, and kidneys are unremarkable. The urinary bladder, uterus, and adnexa are unremarkable in CT appearance.  Tiny hiatal hernia. Mild colorectal stool. Colonic diverticula, without CT evidence of diverticulitis. No bowel obstruction or significant bowel wall thickening. The appendix is normal.  No free fluid in the abdomen or pelvis. No free intraperitoneal air. No abdominal or pelvic lymphadenopathy is identified.  Severe rotatory thoracolumbar dextroscoliosis. Multilevel multilevel spondylosis, most severe on the left at T12-L1 and L1-L2 and on the right at L2-L3. No acute osseous abnormality or concerning osseous lesion.       1. No acute abnormality in the abdomen or pelvis. 2. Colonic diverticula, without CT evidence of diverticulitis. 3. Multifocal bibasilar subsegmental atelectasis and/or scarring with patchy predominantly peripheral groundglass opacities and interstitial thickening in each lung base that could represent superimposed chronic lung disease or atypical pneumonia. 4. Thoracolumbar scoliosis and spondylosis.  This report was finalized on 4/3/2024 4:21 PM by Nik Jaeger MD  on Workstation: [id number]       Ordered the above noted radiological studies.  Independently interpreted by me and my independent review of findings can be found in the ED Course.  See dictation for official radiology interpretation.      PROCEDURES    Procedures    EKG - Per my independent interpretation at 1454:    EKG Time: 1449  Rhythm/Rate: AV dual paced rhythm with a rate of 90  Left axis deviation  QRS of 154, QTc of 520  No acute ischemic changes  No STEMI       No prior for visual comparison      MEDICATIONS GIVEN IN ER    Medications   sodium chloride 0.9 % flush 10 mL (has no administration in time range)   HYDROcodone-acetaminophen (NORCO) 5-325 MG per tablet 1 tablet (has no administration in time range)   acetaminophen (TYLENOL) tablet 650 mg (has no administration in time range)   ondansetron ODT (ZOFRAN-ODT) disintegrating tablet 4 mg (has no administration in time range)     Or   ondansetron (ZOFRAN) injection 4 mg (has no administration in time range)   melatonin tablet 3 mg (has no administration in time range)   sennosides-docusate (PERICOLACE) 8.6-50 MG per tablet 2 tablet (has no administration in time range)     And   polyethylene glycol (MIRALAX) packet 17 g (has no administration in time range)     And   bisacodyl (DULCOLAX) EC tablet 5 mg (has no administration in time range)     And   bisacodyl (DULCOLAX) suppository 10 mg (has no administration in time range)   iopamidol (ISOVUE-300) 61 % injection 100 mL (85 mL Intravenous Given 4/3/24 1556)         PROGRESS, DATA ANALYSIS, CONSULTS, AND MEDICAL DECISION MAKING    Please note that this section constitutes my independent interpretation of clinical data including lab results, radiology, EKG's.  This constitutes my independent professional opinion regarding differential diagnosis and management of this patient.  It may include any factors such as history from outside sources, review of external records, social determinants of health,  management of medications, response to those treatments, and discussions with other providers.    Differential Diagnosis and Plan: Initial concern for musculoskeletal chest wall issue, pleurisy, pneumonia, pleural effusion, cholelithiasis, cholecystitis, gastritis, among others.  Plan for labs, CT scan, EKG, and reevaluation with results.    Additional sources:  - Discussed/ obtained information from independent historians:       - (Social Determinants of Health): None     - Shared decision making:  Patient and family at bedside fully updated on and in agreement with the course and plan moving forward    ED Course as of 04/03/24 1751 Wed Apr 03, 2024   1407 WBC: 4.46 [DC]   1407 Hemoglobin: 13.0 [DC]   1407 Platelets: 145 [DC]   1427 Glucose: 90 [DC]   1427 BUN: 17 [DC]   1428 Creatinine: 0.83 [DC]   1428 Sodium: 140 [DC]   1428 Potassium: 4.4 [DC]   1428 Lipase: 26 [DC]   1612 CT Abdomen Pelvis With Contrast  Per my independent interpretation of the CT abdomen pelvis, no evidence of bowel obstruction, no significant cholelithiasis or evidence of cholecystitis, no overtly obvious colitis or diverticulitis [DC]   1705 On attempted mobilization, severe return of pain without hypoxia.  Unclear etiology of symptoms but will plan for hospitalization for further pain control and monitoring. [DC]   1740 Lactate: 1.1 [DC]   1740 Procalcitonin: 0.03 [DC]   1750 Discussed with Dr. Spencer, [facility], discussed patient's clinical course and findings today, treatment modalities, and need for hospitalization. [DC]      ED Course User Index  [DC] Mervin Willoughby MD       Hospitalization Considered?: yes    Orders Placed During This Visit:  Orders Placed This Encounter   Procedures    CT Abdomen Pelvis With Contrast    [place] Draw    Comprehensive Metabolic Panel    Lipase    Urinalysis With Microscopic If Indicated (No Culture) - Urine, Clean Catch    CBC Auto Differential    Single High Sensitivity Troponin T    Procalcitonin     Lactic Acid, Plasma    Procalcitonin    Single High Sensitivity Troponin T    Basic Metabolic Panel    CBC (No Diff)    Diet: Cardiac; Healthy Heart (2-3 Na+); Fluid Consistency: Thin (IDDSI 0)    Undress & Gown    Vital Signs    Up with assistance    Daily Weights    Strict Intake & Output    Oral Care    Place Sequential Compression Device    Maintain Sequential Compression Device    Code Status and Medical Interventions:    [facility] (on-call MD unless specified) Details    ECG 12 Lead Chest Pain    Insert Peripheral IV    Initiate Observation Status    CBC & Differential    Green Top (Gel)    Lavender Top    Gold Top - SST    Light Blue Top       Additional orders considered but not placed:      Independent interpretation of labs, radiology studies, and discussions with consultants: See ED Course        AS OF 17:51 EDT VITALS:    BP - 140/78  HR - 91  TEMP - 98.3 °F (36.8 °C)  02 SATS - 94%          DIAGNOSIS  Final diagnoses:   Right-sided chest pain   History of atrial fibrillation   History of coronary artery disease   History of scoliosis         DISPOSITION  ED Disposition       ED Disposition   Decision to Admit    Condition   --    Comment   Level of Care: Telemetry [5]   Diagnosis: Chest pain [549595]   Admitting Physician: CYNDI BIRMINGHAM [[id number]]   Attending Physician: CYNDI BIRMINGHAM [[id number]]                  Please note that portions of this document were completed with a voice recognition program.    Note Disclaimer: At [facility], we believe that sharing information builds trust and better relationships. You are receiving this note because you recently visited [facility]. It is possible you will see health information before a provider has talked with you about it. This kind of information can be easy to misunderstand. To help you fully understand what it means for your health, we urge you to discuss this note with your provider.                       Mervin Willoughby MD  04/03/24 [id number]

## 2024-04-04 ENCOUNTER — TELEPHONE (OUTPATIENT)
Dept: FAMILY MEDICINE CLINIC | Facility: CLINIC | Age: 89
End: 2024-04-04
Payer: MEDICARE

## 2024-04-04 ENCOUNTER — APPOINTMENT (OUTPATIENT)
Dept: CT IMAGING | Facility: HOSPITAL | Age: 89
End: 2024-04-04
Payer: MEDICARE

## 2024-04-04 DIAGNOSIS — M54.6 ACUTE BILATERAL THORACIC BACK PAIN: ICD-10-CM

## 2024-04-04 DIAGNOSIS — M80.88XA OSTEOPOROTIC COMPRESSION FRACTURE OF VERTEBRA, INITIAL ENCOUNTER: Primary | ICD-10-CM

## 2024-04-04 PROBLEM — R10.9 ABDOMINAL PAIN: Status: ACTIVE | Noted: 2024-04-04

## 2024-04-04 PROBLEM — S22.000A THORACIC COMPRESSION FRACTURE: Status: ACTIVE | Noted: 2024-04-04

## 2024-04-04 PROBLEM — I10 HTN (HYPERTENSION): Status: ACTIVE | Noted: 2024-04-04

## 2024-04-04 PROBLEM — R33.8 ACUTE URINARY RETENTION: Status: ACTIVE | Noted: 2024-04-04

## 2024-04-04 LAB
ANION GAP SERPL CALCULATED.3IONS-SCNC: 7.7 MMOL/L (ref 5–15)
BUN SERPL-MCNC: 15 MG/DL (ref 8–23)
BUN/CREAT SERPL: 19.2 (ref 7–25)
CALCIUM SPEC-SCNC: 8.8 MG/DL (ref 8.2–9.6)
CHLORIDE SERPL-SCNC: 108 MMOL/L (ref 98–107)
CO2 SERPL-SCNC: 25.3 MMOL/L (ref 22–29)
CREAT SERPL-MCNC: 0.78 MG/DL (ref 0.57–1)
DEPRECATED RDW RBC AUTO: 41.4 FL (ref 37–54)
EGFRCR SERPLBLD CKD-EPI 2021: 71.8 ML/MIN/1.73
ERYTHROCYTE [DISTWIDTH] IN BLOOD BY AUTOMATED COUNT: 13.1 % (ref 12.3–15.4)
GLUCOSE SERPL-MCNC: 86 MG/DL (ref 65–99)
HCT VFR BLD AUTO: 37.7 % (ref 34–46.6)
HGB BLD-MCNC: 12.3 G/DL (ref 12–15.9)
MCH RBC QN AUTO: 28.4 PG (ref 26.6–33)
MCHC RBC AUTO-ENTMCNC: 32.6 G/DL (ref 31.5–35.7)
MCV RBC AUTO: 87.1 FL (ref 79–97)
PLATELET # BLD AUTO: 141 10*3/MM3 (ref 140–450)
PMV BLD AUTO: 10.6 FL (ref 6–12)
POTASSIUM SERPL-SCNC: 4.7 MMOL/L (ref 3.5–5.2)
RBC # BLD AUTO: 4.33 10*6/MM3 (ref 3.77–5.28)
SODIUM SERPL-SCNC: 141 MMOL/L (ref 136–145)
WBC NRBC COR # BLD AUTO: 4.27 10*3/MM3 (ref 3.4–10.8)

## 2024-04-04 PROCEDURE — G0378 HOSPITAL OBSERVATION PER HR: HCPCS

## 2024-04-04 PROCEDURE — 99213 OFFICE O/P EST LOW 20 MIN: CPT | Performed by: NURSE PRACTITIONER

## 2024-04-04 PROCEDURE — 72128 CT CHEST SPINE W/O DYE: CPT

## 2024-04-04 PROCEDURE — 97162 PT EVAL MOD COMPLEX 30 MIN: CPT

## 2024-04-04 PROCEDURE — 51798 US URINE CAPACITY MEASURE: CPT

## 2024-04-04 PROCEDURE — 51702 INSERT TEMP BLADDER CATH: CPT

## 2024-04-04 PROCEDURE — 85027 COMPLETE CBC AUTOMATED: CPT | Performed by: INTERNAL MEDICINE

## 2024-04-04 PROCEDURE — 97530 THERAPEUTIC ACTIVITIES: CPT

## 2024-04-04 PROCEDURE — 80048 BASIC METABOLIC PNL TOTAL CA: CPT | Performed by: INTERNAL MEDICINE

## 2024-04-04 RX ORDER — LIDOCAINE 4 G/G
1 PATCH TOPICAL
Status: DISCONTINUED | OUTPATIENT
Start: 2024-04-04 | End: 2024-04-05 | Stop reason: HOSPADM

## 2024-04-04 RX ADMIN — GABAPENTIN 100 MG: 100 CAPSULE ORAL at 08:38

## 2024-04-04 RX ADMIN — CARVEDILOL 3.12 MG: 3.12 TABLET, FILM COATED ORAL at 20:29

## 2024-04-04 RX ADMIN — GABAPENTIN 100 MG: 100 CAPSULE ORAL at 18:03

## 2024-04-04 RX ADMIN — CARVEDILOL 3.12 MG: 3.12 TABLET, FILM COATED ORAL at 08:38

## 2024-04-04 RX ADMIN — VALSARTAN 40 MG: 40 TABLET, COATED ORAL at 08:38

## 2024-04-04 RX ADMIN — GABAPENTIN 100 MG: 100 CAPSULE ORAL at 20:28

## 2024-04-04 RX ADMIN — GABAPENTIN 100 MG: 100 CAPSULE ORAL at 12:50

## 2024-04-04 RX ADMIN — LEVOTHYROXINE SODIUM 112 MCG: 112 TABLET ORAL at 08:38

## 2024-04-04 NOTE — THERAPY EVALUATION
Patient Name: Barbie Hernandez  : 1932    MRN: [id number]                              Today's Date: 2024       Admit Date: 4/3/2024    Visit Dx:     ICD-10-CM ICD-9-CM   1. Right-sided chest pain  R07.9 786.50   2. History of atrial fibrillation  Z86.79 V12.59   3. History of coronary artery disease  Z86.79 V12.59   4. History of scoliosis  Z87.39 V13.59     Patient Active Problem List   Diagnosis    Other specified hypothyroidism    Paroxysmal atrial fibrillation    PPD positive    Lower back pain    Osteoporosis    Advanced atrophic nonexudative age-related macular degeneration of right eye with subfoveal involvement    Cardiomyopathy    Congestive heart failure    Diastolic dysfunction    Scoliosis    Tachycardia-bradycardia    Spondylolisthesis of lumbar region    Arthropathy of right shoulder    Senile osteoporosis    Strain of right shoulder    Therapeutic drug monitoring    Short-term memory loss    Chest pain     Past Medical History:   Diagnosis Date    A-fib     Arthritis     Atrial fibrillation     Cancer     Coronary artery disease     Disease of thyroid gland     Hyperlipidemia     Low back pain     Macular degeneration     Osteoporosis      Past Surgical History:   Procedure Laterality Date    CATARACT EXTRACTION Bilateral     COLON RESECTION      INTESTINAL OBBSTRUCTION    EPIDURAL BLOCK      PACEMAKER IMPLANTATION      PYLOROMYOTOMY      UMBILICAL HERNIA REPAIR        General Information       Row Name 24 1131          Physical Therapy Time and Intention    Document Type evaluation  -MS     Mode of Treatment physical therapy;individual therapy  -MS       Row Name 24 1131          General Information    Patient Profile Reviewed yes  -MS     Prior Level of Function --  Use of cane for short distance ambulation and Rwx for long distance ambulation  -MS     Existing Precautions/Restrictions spinal;fall   Exit alarm  -MS     Barriers to Rehab none identified  -MS       Row Name  04/04/24 1131          Cognition    Orientation Status (Cognition) oriented x 3  -MS       Row Name 04/04/24 1131          Safety Issues, Functional Mobility    Comment, Safety Issues/Impairments (Mobility) Gait belt used for safety.  -MS               User Key  (r) = Recorded By, (t) = Taken By, (c) = Cosigned By      Initials Name Provider Type    MS SueroGiffordBrady, PT Physical Therapist                   Mobility       Row Name 04/04/24 1132          Bed Mobility    Bed Mobility supine-sit;sit-supine  -MS     Supine-Sit [place] (Bed Mobility) contact guard  -MS     Comment, (Bed Mobility) Instructed pt. on logroll  -MS       Row Name 04/04/24 1132          Sit-Stand Transfer    Sit-Stand [place] (Transfers) contact guard  -MS     Assistive Device (Sit-Stand Transfers) walker, front-wheeled  -MS       Row Name 04/04/24 1132          Gait/Stairs (Locomotion)    [place] Level (Gait) contact guard  -MS     Assistive Device (Gait) walker, front-wheeled  -MS     Distance in Feet (Gait) 80  -MS     Deviations/Abnormal Patterns (Gait) neelima decreased  -MS     Bilateral Gait Deviations forward flexed posture  -MS     Comment, (Gait/Stairs) Verbal/tactile cues given for posture correction.  -MS               User Key  (r) = Recorded By, (t) = Taken By, (c) = Cosigned By      Initials Name Provider Type    Brady Owens, PT Physical Therapist                   Obj/Interventions       Row Name 04/04/24 1133          Range of Motion Comprehensive    Comment, General Range of Motion BUE/LE (WFL's)  -MS       Row Name 04/04/24 1133          Strength Comprehensive (MMT)    Comment, General Manual Muscle Testing (MMT) Assessment BUE/LE (3+/5)  -MS               User Key  (r) = Recorded By, (t) = Taken By, (c) = Cosigned By      Initials Name Provider Type    Brady Owens, PT Physical Therapist                   Goals/Plan       Row Name 04/04/24 1134          Bed Mobility Goal 1 (PT)     "Activity/Assistive Device (Bed Mobility Goal 1, PT) bed mobility activities, all  -MS     [place] Level/Cues Needed (Bed Mobility Goal 1, PT) independent  -MS     Time Frame (Bed Mobility Goal 1, PT) long term goal (LTG);1 week  -MS       Row Name 04/04/24 1134          Transfer Goal 1 (PT)    Activity/Assistive Device (Transfer Goal 1, PT) transfers, all  -MS     [place] Level/Cues Needed (Transfer Goal 1, PT) independent  -MS     Time Frame (Transfer Goal 1, PT) long term goal (LTG);1 week  -MS       Row Name 04/04/24 1134          Gait Training Goal 1 (PT)    Activity/Assistive Device (Gait Training Goal 1, PT) gait (walking locomotion)  With AAD  -MS     [place] Level (Gait Training Goal 1, PT) independent  -MS     Distance (Gait Training Goal 1, PT) 150 feet  -MS     Time Frame (Gait Training Goal 1, PT) long term goal (LTG);1 week  -MS       Row Name 04/04/24 1134          Therapy Assessment/Plan (PT)    Planned Therapy Interventions (PT) balance training;bed mobility training;gait training;home exercise program;patient/family education;postural re-education;transfer training;strengthening  -MS               User Key  (r) = Recorded By, (t) = Taken By, (c) = Cosigned By      Initials Name Provider Type    Brady Owens, PT Physical Therapist                   Clinical Impression       Row Name 04/04/24 1133          Pain    Pretreatment Pain Rating 3/10  -MS     Posttreatment Pain Rating 3/10  -MS     Pre/Posttreatment Pain Comment Upper abdomen pain that \"wraps\" around to her back.  -MS       Row Name 04/04/24 1133          Plan of Care Review    Plan of Care Reviewed With patient;family  -MS       Row Name 04/04/24 1133          Therapy Assessment/Plan (PT)    Rehab Potential (PT) good, to achieve stated therapy goals  -MS     Criteria for Skilled Interventions Met (PT) meets criteria  -MS     Therapy Frequency (PT) 5 times/wk  -MS       Row Name 04/04/24 1133          Positioning and " Restraints    Pre-Treatment Position in bed  -MS     Post Treatment Position chair  -MS     In Chair notified nsg;sitting;call light within reach;encouraged to call for assist;exit alarm on;with family/caregiver  -MS               User Key  (r) = Recorded By, (t) = Taken By, (c) = Cosigned By      Initials Name Provider Type    MS Gifford Brady ALEGRIA, PT Physical Therapist                   Outcome Measures       Row Name 04/04/24 1135 04/04/24 0839       How much help from another person do you currently need...    Turning from your back to your side while in flat bed without using bedrails? 3  -MS 3  -NC    Moving from lying on back to sitting on the side of a flat bed without bedrails? 3  -MS 3  -NC    Moving to and from a bed to a chair (including a wheelchair)? 3  -MS 3  -NC    Standing up from a chair using your arms (e.g., wheelchair, bedside chair)? 3  -MS 4  -NC    Climbing 3-5 steps with a railing? 3  -MS 2  -NC    To walk in hospital room? 3  -MS 3  -NC    AM-PAC 6 Clicks Score (PT) 18  -MS 18  -NC    Highest Level of Mobility Goal 6 --> Walk 10 steps or more  -MS 6 --> Walk 10 steps or more  -NC      Row Name 04/04/24 1135          Functional Assessment    Outcome Measure Options AM-PAC 6 Clicks Basic Mobility (PT)  -MS               User Key  (r) = Recorded By, (t) = Taken By, (c) = Cosigned By      Initials Name Provider Type    MS GiffordBrady, PT Physical Therapist    Lucretia Gates RN Registered Nurse                                 Physical Therapy Education       Title: PT OT SLP Therapies (In Progress)       Topic: Physical Therapy (In Progress)       Point: Mobility training (Done)       Learning Progress Summary             Patient Acceptance, E,D, VU,NR by MS at 4/4/2024 1135                         Point: Home exercise program (Not Started)       Learner Progress:  Not documented in this visit.              Point: Body mechanics (Done)       Learning Progress Summary              Patient Acceptance, E,D, VU,NR by MS at 4/4/2024 1135                         Point: Precautions (Done)       Learning Progress Summary             Patient Acceptance, E,D, VU,NR by MS at 4/4/2024 1135                                         User Key       Initials Effective Dates Name Provider Type Discipline    MS 06/16/21 -  Brady Gifford, PT Physical Therapist PT                  PT Recommendation and Plan  Planned Therapy Interventions (PT): balance training, bed mobility training, gait training, home exercise program, patient/family education, postural re-education, transfer training, strengthening  Plan of Care Reviewed With: patient  Outcome Evaluation: Pt. is a 89 year old Female admitted to the hospital with chest pain.  Pt. reports that prior to admission she was using a cane for short distance ambulation and a Rwx for longer distance ambulation.  Pt. currently presents with decreased strength, decreased balance, and decreased tolerance to functional activity.  This AM, pt. able to ambulate 80 feet, CGA x 1, with use of Rwx.  Pt. requires CGA x 1 for bed mobility (via logroll) and CGA x 1 for sit <-> stand transfers.  Verbal/tactile cues given during upright mobility for posture correction.  Pt. will benefit from skilled inpt. P.T. to address her functional deficits and to assist pt. in regaining her maximum level of independence with functional mobility.     Time Calculation:         PT Charges       Row Name 04/04/24 1138             Time Calculation    Start Time 1055  -MS      Stop Time 1110  -MS      Time Calculation (min) 15 min  -MS      PT Received On 04/04/24  -MS      PT - Next Appointment 04/05/24  -MS      PT Goal Re-Cert Due Date 04/11/24  -MS         Time Calculation- PT    Total Timed Code Minutes- PT 14 minute(s)  -MS                User Key  (r) = Recorded By, (t) = Taken By, (c) = Cosigned By      Initials Name Provider Type    MS Brady Gifford, PT Physical Therapist                   Therapy Charges for Today       Code Description Service Date Service Provider Modifiers Qty    21129999756 HC PT EVAL MOD COMPLEXITY 2 4/4/2024 Brady Gifford, PT GP 1    72621701585 HC PT THERAPEUTIC ACT EA 15 MIN 4/4/2024 Brady Gifford, PT GP 1            PT G-Codes  Outcome Measure Options: AM-PAC 6 Clicks Basic Mobility (PT)  AM-PAC 6 Clicks Score (PT): 18  PT Discharge Summary  Anticipated Discharge Disposition (PT): home with assist, home with home health, home with outpatient therapy services    Brady Gifford, PT  4/4/2024

## 2024-04-04 NOTE — PROGRESS NOTES
Name: Barbie Hernandez ADMIT: 4/3/2024   : 1932  PCP: Tika Perry MD    MRN: [id number] LOS: 0 days   AGE/SEX: 89 y.o. female  ROOM: [facility]     Subjective   Subjective   No acute events overnight.  Spoke with patient at length regarding her symptoms.  No current chest pain.  Right upper quadrant pain is at a minimum this morning.  She does say that the pain worsens when she stands up or moves around.  It does not get worse with breathing.  She denies shortness of breath at this time.    Objective   Objective   Vital Signs  Temp:  [97.5 °F (36.4 °C)-98.6 °F (37 °C)] 97.7 °F (36.5 °C)  Heart Rate:  [90-95] 92  Resp:  [16-18] 18  BP: (102-140)/(68-87) 102/68  SpO2:  [94 %-100 %] 96 %  on   ;   Device (Oxygen Therapy): room air  Body mass index is 22.48 kg/m².    Physical Exam  General: Alert, no acute distress.  Very pleasant and conversive.  Answers questions appropriately.  ENT: No conjunctival injection or scleral icterus. Moist mucous membranes.  Neuro: Eyes open and moving in all directions, strength normal in all extremities, no focal deficits.  Face symmetric.  Cranial nerves grossly intact.  Lungs: Clear to auscultation bilaterally. No wheeze or crackles. No distress.   Heart: RRR, no murmurs. No edema.  Abdomen: Soft, non-tender, non-distended. Normal bowel sounds.   Ext: Warm and well-perfused. No edema.   Skin: No rashes or lesions. IV site without swelling or erythema.     Results Review     I reviewed the patient's new clinical results:  Results from last 7 days   Lab Units 24  0338 24  1345   WBC 10*3/mm3 4.27 4.46   HEMOGLOBIN g/dL 12.3 13.0   PLATELETS 10*3/mm3 141 145     Results from last 7 days   Lab Units 24  0338 24  1345   SODIUM mmol/L 141 140   POTASSIUM mmol/L 4.7 4.4   CHLORIDE mmol/L 108* 107   CO2 mmol/L 25.3 24.2   BUN mg/dL 15 17   CREATININE mg/dL 0.78 0.83   GLUCOSE mg/dL 86 90   EGFR mL/min/1.73 71.8 66.6     Results from last 7 days   Lab Units  "04/03/24  1345   ALBUMIN g/dL 3.6   BILIRUBIN mg/dL 0.5   ALK PHOS U/L 58   AST (SGOT) U/L 19   ALT (SGPT) U/L 13     Results from last 7 days   Lab Units 04/04/24  0338 04/03/24  1345   CALCIUM mg/dL 8.8 8.8   ALBUMIN g/dL  --  3.6     Results from last 7 days   Lab Units 04/03/24  1906 04/03/24  1712 04/03/24  1345   PROCALCITONIN ng/mL 0.03  --  0.03   LACTATE mmol/L  --  1.1  --      No results found for: \"HGBA1C\", \"POCGLU\"    CT Thoracic Spine Without Contrast    Result Date: 4/4/2024  There is a mild acute compression fracture involving the T9 vertebral body. Loss of vertical height is approximately 25% in severity. Acute fracture planes are appreciated involving the superior endplate. There is no evidence of bony retropulsion, no obvious disc herniation or of an epidural hematoma. If the patient's cardiac pacer is MR-compatible, further evaluation could be performed with a MRI examination of the thoracic spine.   Radiation dose reduction techniques were utilized, including automated exposure control and exposure modulation based on body size.   This report was finalized on 4/4/2024 10:17 AM by Dr. Ted Quinn M.D on Workstation: [id number]      CT Abdomen Pelvis With Contrast    Result Date: 4/3/2024   1. No acute abnormality in the abdomen or pelvis. 2. Colonic diverticula, without CT evidence of diverticulitis. 3. Multifocal bibasilar subsegmental atelectasis and/or scarring with patchy predominantly peripheral groundglass opacities and interstitial thickening in each lung base that could represent superimposed chronic lung disease or atypical pneumonia. 4. Thoracolumbar scoliosis and spondylosis.  This report was finalized on 4/3/2024 4:21 PM by Nik Jaeger MD on Workstation: [id number]       I have personally reviewed all medications:  Scheduled Medications  carvedilol, 3.125 mg, Oral, BID  gabapentin, 100 mg, Oral, 4x Daily  levothyroxine, 112 mcg, Oral, Daily  Lidocaine, 1 patch, Transdermal, " Q24H  valsartan, 40 mg, Oral, Daily    Infusions   Diet  Diet: Cardiac; Healthy Heart (2-3 Na+); Fluid Consistency: Thin (IDDSI 0)    Assessment/Plan     Active Hospital Problems    Diagnosis  POA    **Thoracic compression fracture [S22.000A]  Unknown    Abdominal pain [R10.9]  Unknown    Acute urinary retention [R33.8]  Unknown    HTN (hypertension) [I10]  Unknown    Chest pain [R07.9]  Yes      Resolved Hospital Problems   No resolved problems to display.       89 y.o. female with Thoracic compression fracture.    Right-sided chest pain, right upper quadrant pain  Acute thoracic compression fracture  -Troponin mildly elevated but flat  -EKG with no evidence of ischemic changes  -CT abdomen/pelvis with no acute abnormalities, but does reveal multifocal bibasilar atelectasis and/or scarring suggestive of chronic lung disease or atypical pneumonia  -CT thoracic spine with acute compression fracture involving T9  -Procalcitonin negative, normal WBC  -At this point, workup has been fairly unremarkable other than the acute compression fracture.  That would be highest on the differential at this time as the etiology for the patient's pain.  Does not seem cardiac in nature, as it is on the right side of her chest and also in her right upper quadrant.  There is mention of atypical pneumonia on CT abdomen/pelvis, but procalcitonin is negative and patient has a normal WBC.  She is afebrile, on room air, and without cough.  Suspicion for pneumonia is very low.  The patient has normal vital signs and pain does not worsen with respiration.  Given that it is more in her abdomen, low concern for pulmonary embolus.  -Consult [facility] to evaluate for acute compression fracture  -PT/OT consult  -Pending [facility] evaluation, could consider CT chest to further evaluate for lung pathology if symptoms do not improve  -Add lidocaine patch    Acute urinary retention  -Patient with new urinary retention, Pena catheter placed  -Urinalysis not  consistent with infection  -Compression fracture does not show any edema or spinal cord involvement, but [facility] evaluating as above.  They recommend MRI.  -Medications reviewed, none likely contributing  -Leave Pena catheter in place today, consider voiding trial prior to discharge    Hypertension  -Blood pressure acceptable at this time  -Continue Coreg and valsartan    SCDs for DVT prophylaxis.  Full code.  Discussed with patient and nursing staff.  Anticipate discharge TBD      Bonny Bae MD  [facility]  04/04/24  12:41 EDT

## 2024-04-04 NOTE — PLAN OF CARE
Goal Outcome Evaluation:         Pt A&Ox4, VSS and dual paced on the monitor. F/c placed today d/t urinary retention, shakira system has been documented. The goal is to discontinue at discharge. Pt has back brace at bedside. Pt in bed with no further complaints  Problem: Adult Inpatient Plan of Care  Goal: Plan of Care Review  Outcome: Ongoing, Progressing

## 2024-04-04 NOTE — H&P
HISTORY AND PHYSICAL   [facility]        Date of Admission: 4/3/2024  Patient Identification:  Name: Barbie Hernandez  Age: 89 y.o.  Sex: female  :  1932  MRN: [id number]                     Primary Care Physician: Tika Perry MD    Chief Complaint:  89 year old female presented to the emergency room with pain of the right side of her chest and upper abdomen which started last night; she feels worse with movement but better with rest; she has some chronic shortness of breath which is not worse than usual for her; no fever or chills; no cough    History of Present Illness:   As above    Past Medical History:  Past Medical History:   Diagnosis Date    A-fib     Arthritis     Atrial fibrillation     Cancer     Coronary artery disease     Disease of thyroid gland     Hyperlipidemia     Low back pain     Macular degeneration     Osteoporosis      Past Surgical History:  Past Surgical History:   Procedure Laterality Date    CATARACT EXTRACTION Bilateral     COLON RESECTION      INTESTINAL OBBSTRUCTION    EPIDURAL BLOCK      PACEMAKER IMPLANTATION      PYLOROMYOTOMY      UMBILICAL HERNIA REPAIR        Home Meds:  Medications Prior to Admission   Medication Sig Dispense Refill Last Dose    bevacizumab (AVASTIN) 100 MG/4ML chemo injection Infuse 4 mL into a venous catheter See Admin Instructions. Every 6 to 7 weeks only in right eye       carvedilol (COREG) 3.125 MG tablet Take 1 tablet by mouth 2 (Two) Times a Day.   4/3/2024    gabapentin (NEURONTIN) 100 MG capsule Take 1 capsule by mouth 4 (Four) Times a Day. 120 capsule 0 4/3/2024    levothyroxine (SYNTHROID, LEVOTHROID) 112 MCG tablet Take 1 tablet by mouth Daily. 90 tablet 1 4/3/2024    valsartan (DIOVAN) 40 MG tablet Take 1 tablet by mouth Daily.   4/3/2024    CALCIUM PO Take  by mouth.       latanoprost (XALATAN) 0.005 % ophthalmic solution        Zoledronic Acid (RECLAST IV) Infuse  into a venous catheter. Yearly           Allergies:  Allergies   Allergen Reactions    Ciprofloxacin Rash    Naproxen Rash    Sulfa Antibiotics Nausea Only    Theophylline Rash    Vancomycin Rash     Immunizations:  Immunization History   Administered Date(s) Administered    COVID-19 (PFIZER) BIVALENT 12+YRS 10/04/2022    COVID-19 (PFIZER) Purple Cap Monovalent 01/23/2021, 02/12/2021, 10/14/2021    COVID-19 F23 (MODERNA) 12YRS+ (SPIKEVAX) 10/25/2023    Covid-19 (Pfizer) Gray Cap Monovalent 05/06/2022    FLUAD TRI 65YR+ 10/19/2019    Fluad Quad 65+ 09/25/2020, 10/05/2021, 10/25/2023    Fluzone High Dose =>65 Years (Vaxcare ONLY) 09/20/2016, 09/19/2017, 10/05/2018    Fluzone High-Dose 65+yrs 10/04/2022    Pneumococcal Conjugate 13-Valent (PCV13) 12/17/2015    Pneumococcal Polysaccharide (PPSV23) 10/29/1997, 09/01/2017    Zostavax 06/24/2011     Social History:   Social History     Social History Narrative    Not on file     Social History     Socioeconomic History    Marital status:    Tobacco Use    Smoking status: Never    Smokeless tobacco: Never   Vaping Use    Vaping status: Never Used   Substance and Sexual Activity    Alcohol use: No    Drug use: No    Sexual activity: Defer     Birth control/protection: Post-menopausal       Family History:  Family History   Problem Relation Age of Onset    Breast cancer Mother     OCD Mother         neurotic and recluse     Lung cancer Father     Diabetes type I Son     Breast cancer Maternal Grandmother     Breast cancer Other     Ovarian cancer Sister     Stroke Neg Hx     Heart attack Neg Hx         Review of Systems  See history of present illness and past medical history.  Patient denies headache, dizziness, syncope, falls, trauma, change in vision, change in hearing, change in taste, changes in weight, changes in appetite, focal weakness, numbness, or paresthesia.  Patient denies  palpitations,  orthopnea, PND, cough, sinus pressure, rhinorrhea, epistaxis, hemoptysis, nausea, vomiting,hematemesis,  "diarrhea, constipation or hematochezia.  Denies cold or heat intolerance, polydipsia, polyuria, polyphagia. Denies hematuria, pyuria, dysuria, hesitancy, frequency or urgency. Denies consumption of raw and under cooked meats foods or change in water source.  Denies fever, chills, sweats, night sweats.  Denies missing any routine medications.     Objective:  T Max 24 hrs: Temp (24hrs), Av.5 °F (36.9 °C), Min:98.3 °F (36.8 °C), Max:98.6 °F (37 °C)    Vitals Ranges:   Temp:  [98.3 °F (36.8 °C)-98.6 °F (37 °C)] 98.6 °F (37 °C)  Heart Rate:  [91-95] 91  Resp:  [16-18] 18  BP: (129-140)/(78-87) 129/78      Exam:  /78 (BP Location: Right arm, Patient Position: Lying)   Pulse 91   Temp 98.6 °F (37 °C) (Oral)   Resp 18   Ht 162.6 cm (64\")   Wt 59.4 kg (130 lb 15.3 oz)   LMP  (LMP Unknown) Comment: menopause  SpO2 100%   BMI 22.48 kg/m²     General Appearance:    Alert, cooperative, no distress, appears stated age   Head:    Normocephalic, without obvious abnormality, atraumatic   Eyes:    PERRL, conjunctivae/corneas clear, EOM's intact, both eyes   Ears:    Normal external ear canals, both ears   Nose:   Nares normal, septum midline, mucosa normal, no drainage    or sinus tenderness   Throat:   Lips, mucosa, and tongue normal   Neck:   Supple, symmetrical, trachea midline, no adenopathy;     thyroid:  no enlargement/tenderness/nodules; no carotid    bruit or JVD   Back:     Symmetric, no curvature, ROM normal, no CVA tenderness   Lungs:     Decreased breath sounds bilaterally, respirations unlabored   Chest Wall:    No tenderness or deformity    Heart:    Regular rate and rhythm, S1 and S2 normal, no murmur, rub   or gallop   Abdomen:     Soft, nontender, bowel sounds active all four quadrants,     no masses, no hepatomegaly, no splenomegaly   Extremities:   Extremities normal, atraumatic, no cyanosis or edema                       .    Data Review:  Labs in chart were reviewed.  WBC   Date Value Ref Range " Status   04/03/2024 4.46 3.40 - 10.80 10*3/mm3 Final     Hemoglobin   Date Value Ref Range Status   04/03/2024 13.0 12.0 - 15.9 g/dL Final     Hematocrit   Date Value Ref Range Status   04/03/2024 38.5 34.0 - 46.6 % Final     Platelets   Date Value Ref Range Status   04/03/2024 145 140 - 450 10*3/mm3 Final     Sodium   Date Value Ref Range Status   04/03/2024 140 136 - 145 mmol/L Final     Potassium   Date Value Ref Range Status   04/03/2024 4.4 3.5 - 5.2 mmol/L Final     Chloride   Date Value Ref Range Status   04/03/2024 107 98 - 107 mmol/L Final     CO2   Date Value Ref Range Status   04/03/2024 24.2 22.0 - 29.0 mmol/L Final     BUN   Date Value Ref Range Status   04/03/2024 17 8 - 23 mg/dL Final     Creatinine   Date Value Ref Range Status   04/03/2024 0.83 0.57 - 1.00 mg/dL Final     Glucose   Date Value Ref Range Status   04/03/2024 90 65 - 99 mg/dL Final     Calcium   Date Value Ref Range Status   04/03/2024 8.8 8.2 - 9.6 mg/dL Final                Imaging Results (All)       Procedure Component Value Units Date/Time    CT Abdomen Pelvis With Contrast [[id number]] Collected: 04/03/24 1611     Updated: 04/03/24 1624    Narrative:      CT ABDOMEN PELVIS W CONTRAST-     Date of Exam: 4/3/2024 3:37 PM     Indication: RUQ pain.     Comparison Exams: None available.     Technique: Multiple contiguous axial images were acquired through the  abdomen and pelvis following the intravenous administration of 85 mL of  Isovue-300. Reformatted coronal and sagittal sequences were also  reviewed. Radiation dose reduction techniques were utilized, including  automated exposure control and exposure modulation based on body size.     FINDINGS:  Multifocal bibasilar subsegmental atelectasis and/or scarring with  patchy predominantly peripheral groundglass opacities and interstitial  thickening in each lung base that could represent superimposed chronic  lung disease or atypical pneumonia. Calcified remnants of prior  granulomatous  disease in the right lung base.     Calcified remnants of prior granulomatous disease in the liver and  spleen. Tiny subcentimeter low-density lesions in the liver, which are  incompletely evaluated but statistically represent hepatic cysts and/or  hemangiomas. The gallbladder, pancreas, adrenal glands, and kidneys are  unremarkable. The urinary bladder, uterus, and adnexa are unremarkable  in CT appearance.     Tiny hiatal hernia. Mild colorectal stool. Colonic diverticula, without  CT evidence of diverticulitis. No bowel obstruction or significant bowel  wall thickening. The appendix is normal.     No free fluid in the abdomen or pelvis. No free intraperitoneal air. No  abdominal or pelvic lymphadenopathy is identified.     Severe rotatory thoracolumbar dextroscoliosis. Multilevel multilevel  spondylosis, most severe on the left at T12-L1 and L1-L2 and on the  right at L2-L3. No acute osseous abnormality or concerning osseous  lesion.       Impression:         1. No acute abnormality in the abdomen or pelvis.  2. Colonic diverticula, without CT evidence of diverticulitis.  3. Multifocal bibasilar subsegmental atelectasis and/or scarring with  patchy predominantly peripheral groundglass opacities and interstitial  thickening in each lung base that could represent superimposed chronic  lung disease or atypical pneumonia.  4. Thoracolumbar scoliosis and spondylosis.     This report was finalized on 4/3/2024 4:21 PM by Nik Jaeger MD on  Workstation: [id number]                 Assessment:  Active Hospital Problems    Diagnosis  POA    **Chest pain [R07.9]  Yes      Resolved Hospital Problems   No resolved problems to display.   A fib  Hypothyroidism  Underweight  Cad  hypertension    Plan:  Troponin slightly high but flat  Check echo  Check ct chest and thoracic spine  Lidocaine patch  Monitor on telemetry  Dw patient and ed provider    Alana Spencer MD  4/3/2024  20:28 EDT

## 2024-04-04 NOTE — PLAN OF CARE
Goal Outcome Evaluation:  Plan of Care Reviewed With: patient met lying in bed, vital signs stable, patient daughter reported a fall that occurred on Friday-03/29/2024, nurse practitioner on call was notified. No new complain at this time.        Progress: no change

## 2024-04-04 NOTE — DISCHARGE PLACEMENT REQUEST
"Barbie Hernandez (89 y.o. Female)       Date of Birth   09/28/1932    Social Security Number       Address   [address] [facility]  [place] [address]    Home Phone   [phone]    MRN   [id number]       Congregational   Confucianist    Marital Status                               Admission Date   4/3/24    Admission Type   Emergency    Admitting Provider   Alana Spencer MD    Attending Provider   Bonny Bae MD    Department, Room/Bed   [address], S509/1       Discharge Date       Discharge Disposition       Discharge Destination                                 Attending Provider: Bonny Bae MD    Allergies: Ciprofloxacin, Naproxen, Sulfa Antibiotics, Theophylline, Vancomycin    Isolation: None   Infection: None   Code Status: CPR    Ht: 162.6 cm (64\")   Wt: 59.4 kg (130 lb 15.3 oz)    Admission Cmt: None   Principal Problem: Thoracic compression fracture [S22.000A]                   Active Insurance as of 4/3/2024       Primary Coverage       Payor Plan Insurance Group Employer/Plan Group    [facility] MEDICARE REPLACEMENT [facility] MED [facility] GROUP [id number]       Payor Plan Address Payor Plan Phone Number Payor Plan Fax Number Effective Dates    [address] [address]   1/1/2023 - None Entered    [facility] [id number]         Subscriber Name Subscriber Birth Date Member ID       BARBIE HERNANDEZ 9/28/1932 [id number]                     Emergency Contacts        (Rel.) Home Phone Work Phone Mobile Phone    Marisol Salazar (Daughter) [phone] -- --                "

## 2024-04-04 NOTE — PLAN OF CARE
Goal Outcome Evaluation:  Plan of Care Reviewed With: patient           Outcome Evaluation: Pt. is a 89 year old Female admitted to the hospital with chest pain.  Pt. reports that prior to admission she was using a cane for short distance ambulation and a Rwx for longer distance ambulation.  Pt. currently presents with decreased strength, decreased balance, and decreased tolerance to functional activity.  This AM, pt. able to ambulate 80 feet, CGA x 1, with use of Rwx.  Pt. requires CGA x 1 for bed mobility (via logroll) and CGA x 1 for sit <-> stand transfers.  Verbal/tactile cues given during upright mobility for posture correction.  Pt. will benefit from skilled inpt. P.T. to address her functional deficits and to assist pt. in regaining her maximum level of independence with functional mobility.      Anticipated Discharge Disposition (PT): home with assist, home with home health, home with outpatient therapy services

## 2024-04-04 NOTE — SIGNIFICANT NOTE
04/04/24 1553   OTHER   Discipline occupational therapist   Rehab Time/Intention   Session Not Performed   (pt was off floor to CT, then eating lunch, then per RN notes AVELINA ordering spinal MRI.  Will follow up next service date.)   Recommendation   OT - Next Appointment 04/05/24

## 2024-04-04 NOTE — CONSULTS
[facility]   Consult Note    Patient Name: Barbie Hernandez  : 1932  MRN: [id number]  Primary Care Physician:  Tika Perry MD  Referring Physician: Alana Spencer MD  Date of admission: 4/3/2024    Inpatient Neurosurgery Consult  Consult performed by: Minal Olivarez APRN  Consult ordered by: Bonny Bae MD  Reason for consult: Back pain        Subjective   Subjective     Reason for Consult/ Chief Complaint: Midthoracic back pain    History of Present Illness  Barbie Hernandez is a 89 y.o. female who presented to [facility] yesterday for some complaints of chest and midthoracic pain after falling.  She was attempting to get out of bed and subsequently fell 4 to 5 days ago in between the bed and the nightstand.  She was stuck there for a bit but was eventually able to get up and then call for help.  She has a history of atrial fibrillation and pacemaker placement many years ago.  She does not take any blood thinning medications.  She also has a history of osteoporosis and chronic back pain for which she has undergone epidural blocks in the past.  She has no history of prior back surgery or any history of prior osteoporotic compression fractures.  Patient was brought to the emergency room for further evaluation.  CT of the abdomen and pelvis with contrast revealed chronic lung disease versus atypical pneumonia.  There was evidence of severe rotary thoracolumbar dextroscoliosis and multilevel spondylosis greatest on the left at T12-L1 and L1 to as well as on the right at L2-3.  There is no evidence of fracture or osseous lesion on the CT abdomen pelvis.  The patient also underwent CT imaging of the thoracic spine earlier today which revealed a mild to moderate compression deformity of the T9 vertebral body.  The fracture planes involve the superior endplates and show approximately 25% central body height loss.  There is no evidence of retropulsion or any other fractures in  the thoracic spine.  There is also some moderate foraminal stenosis at T8-9 due to osteophyte complexes.  The patient denies any radiating arm or leg pain.  She also denies any neck or lumbar pain.  The pain does feel as though it is wrapping around toward the right side of her ribs more so than on the left.  The pain is slightly worse with deep inspiration and coughing but more significantly worse with position change.  The patient was placed on gabapentin by the admitting service.  Neurosurgery was asked to evaluate and give further recommendations.    Review of Systems   Constitutional:  Positive for activity change. Negative for diaphoresis and fever.   HENT: Negative.  Negative for congestion, sinus pain and sore throat.    Eyes: Negative.    Respiratory: Negative.  Negative for cough, chest tightness and shortness of breath.    Cardiovascular: Negative.  Negative for chest pain and leg swelling.   Gastrointestinal: Negative.  Negative for diarrhea, nausea and vomiting.   Endocrine: Negative.    Genitourinary: Negative.  Negative for dysuria and urgency.   Musculoskeletal:  Positive for back pain. Negative for gait problem, myalgias, neck pain and neck stiffness.   Skin: Negative.  Negative for rash and wound.   Allergic/Immunologic: Negative.    Neurological: Negative.  Negative for dizziness, seizures, syncope, weakness, numbness and headaches.   Hematological: Negative.    Psychiatric/Behavioral: Negative.  Negative for confusion.    All other systems reviewed and are negative.       Personal History     Past Medical History:   Diagnosis Date    A-fib     Arthritis     Atrial fibrillation     Cancer     Coronary artery disease     Disease of thyroid gland     Hyperlipidemia     Low back pain     Macular degeneration     Osteoporosis        Past Surgical History:   Procedure Laterality Date    CATARACT EXTRACTION Bilateral     COLON RESECTION      INTESTINAL OBBSTRUCTION    EPIDURAL BLOCK      PACEMAKER  IMPLANTATION      PYLOROMYOTOMY      UMBILICAL HERNIA REPAIR         Family History: family history includes Breast cancer in her maternal grandmother, mother, and another family member; Diabetes type I in her son; Lung cancer in her father; OCD in her mother; Ovarian cancer in her sister. Otherwise pertinent FHx was reviewed and not pertinent to current issue.    Social History:  reports that she has never smoked. She has never used smokeless tobacco. She reports that she does not drink alcohol and does not use drugs.    Home Medications:   Calcium, Zoledronic Acid, bevacizumab, carvedilol, gabapentin, latanoprost, levothyroxine, and valsartan    Allergies:  Allergies   Allergen Reactions    Ciprofloxacin Rash    Naproxen Rash    Sulfa Antibiotics Nausea Only    Theophylline Rash    Vancomycin Rash       Objective    Objective     Vitals:  Temp:  [97.5 °F (36.4 °C)-98.6 °F (37 °C)] 97.7 °F (36.5 °C)  Heart Rate:  [90-94] 91  Resp:  [18] 18  BP: ()/(62-78) 117/63    Physical Exam  Vitals reviewed.   Constitutional:       General: She is not in acute distress.     Appearance: She is well-developed and normal weight. She is not ill-appearing, toxic-appearing or diaphoretic.   HENT:      Head: Normocephalic and atraumatic.      Nose: Nose normal.      Mouth/Throat:      Mouth: Mucous membranes are moist.      Pharynx: Oropharynx is clear.   Eyes:      General:         Right eye: No discharge.         Left eye: No discharge.      Extraocular Movements: Extraocular movements intact.      Conjunctiva/sclera: Conjunctivae normal.      Pupils: Pupils are equal, round, and reactive to light.   Neck:      Trachea: No tracheal deviation.   Cardiovascular:      Rate and Rhythm: Normal rate.   Pulmonary:      Effort: Pulmonary effort is normal. No respiratory distress.   Abdominal:      General: There is no distension.      Palpations: Abdomen is soft.      Tenderness: There is no abdominal tenderness.   Musculoskeletal:          General: Tenderness present. Normal range of motion.      Cervical back: Normal range of motion and neck supple. No rigidity or tenderness.      Comments: Mildly tender to palpation just below midline and approximately T8, T9.  The patient was able to take deep breaths and cough without any exacerbating pain.   Skin:     General: Skin is warm and dry.      Findings: No erythema or rash.   Neurological:      General: No focal deficit present.      Mental Status: She is alert and oriented to person, place, and time.      GCS: GCS eye subscore is 4. GCS verbal subscore is 5. GCS motor subscore is 6.      Sensory: No sensory deficit.      Motor: No weakness or abnormal muscle tone.      Coordination: Coordination normal.      Deep Tendon Reflexes: Reflexes are normal and symmetric. Reflexes normal.      Comments: The patient is awake alert and oriented x 3.  She gives a fairly adequate history.  She follows all commands readily.  There is no pronator drift or evidence of weakness in upper or lower extremities nor are there any deficits in sensation.  DTR's normal. Negative Grider's; negative clonus. SLR negative bilaterally.  Negative Spurling's bilaterally.  Gait was not tested due to fall risk.    Psychiatric:         Behavior: Behavior is cooperative.         Thought Content: Thought content normal.         Result Review    Result Review:  I have personally reviewed the results from the time of this admission to 4/4/2024 20:13 EDT and agree with these findings:  [x]  Laboratory list / accordion  []  Microbiology  [x]  Radiology  []  EKG/Telemetry   []  Cardiology/Vascular   []  Pathology  []  Old records  []  Other:  Most notable findings include:     CT THORACIC SPINE WITHOUT CONTRAST 4/04/20240 at 0855     HISTORY: Back pain.     COMPARISON: None.     FINDINGS: There are 12 thoracic rib-bearing vertebral bodies. There is  moderate dextroscoliosis of the lumbar spine with apex at level of L2  with  compensatory levoscoliosis with the apex at the level of T1. There  is a mild to moderate compression deformity involving the T9 vertebral  body. Fracture planes are appreciated involving the superior endplates  and there is loss of approximately 25% of vertical height centrally.  There is no evidence of bony retropulsion. No other fractures are  identified. Vacuum disc effect is noted from T11 to L3. Evaluation of  the spinal canal is limited by technique but no obvious disc herniation  or epidural hematoma is identified. Moderate foraminal stenosis is  present to the left at T8-T9 secondary to loss of disc height and  extension of a disc osteophyte complex into the neural foramen.     IMPRESSION:  There is a mild acute compression fracture involving the T9  vertebral body. Loss of vertical height is approximately 25% in  severity. Acute fracture planes are appreciated involving the superior  endplate. There is no evidence of bony retropulsion, no obvious disc  herniation or of an epidural hematoma.     .  Results from last 7 days   Lab Units 04/04/24  0338 04/03/24  1345   WBC 10*3/mm3 4.27 4.46   HEMOGLOBIN g/dL 12.3 13.0   HEMATOCRIT % 37.7 38.5   PLATELETS 10*3/mm3 141 145     .  Results from last 7 days   Lab Units 04/04/24  0338 04/03/24  1345   SODIUM mmol/L 141 140   POTASSIUM mmol/L 4.7 4.4   CHLORIDE mmol/L 108* 107   CO2 mmol/L 25.3 24.2   BUN mg/dL 15 17   CREATININE mg/dL 0.78 0.83   GLUCOSE mg/dL 86 90   CALCIUM mg/dL 8.8 8.8           Assessment & Plan   Assessment / Plan     Brief Patient Summary:  Barbie Hernandez is a 89 y.o. female who experienced a fall 4 to 5 days ago while getting out of bed.  She fell and became wedged in between her bed and nightstand.  After period of time she was able to eventually get up and call for help.  She has noticed some persistent pain in the right mid to lower thoracic region more so than on the left.  This pain does radiating around toward the lower ribs  somewhat.  It does not affect her breathing abilities however the pain is worsened with position change.  CT imaging of the thoracic spine confirms finding of a T9 compression fracture on the CT of the abdomen pelvis.  This is an acute appearing fracture with mild, 25% central body height loss.  The patient has no lower extremity pain or weakness on exam.    Active Hospital Problems:  Active Hospital Problems    Diagnosis     **Osteoporotic compression fracture of vertebra     Abdominal pain     Acute urinary retention     HTN (hypertension)     Pain in thoracic spine     Chest pain      Plan:     I discussed the above history, radiographic findings and physical exam findings with Dr. Salomon.  I have also discussed the findings with the patient and her son.  The patient is not terribly symptomatic and therefore no surgical interventions are recommended at this time.  The patient prefers to try conservative measures with a [place] brace.  Physical therapy can evaluate her in the brace.  We will get thoracic spine x-rays in the brace as well and have the patient follow-up with us in about 6 weeks with new x-rays for comparison.  Symptoms can be managed with medications and with time, Dr. Salomon feels that overall her symptoms should improve.  I have contacted our brace company to fit the patient for a [place] brace.  She can mobilize with physical therapy in the brace and continue wearing the brace when out of bed for comfort.  If her symptoms begin to improve, she does not need to wear the brace.  She does not need to wear the brace to shower.    Nothing further to offer from neurosurgical standpoint at this time.  Will have Dr. Salomon's  contact the patient with the follow-up appointment.  The x-rays have been ordered and can be done close to the follow-up date at any time.  No appointment required for x-rays. Please do not hesitate to call if there are any questions or concerns.    .A [place] TLSO brace has been  ordered due to diagnosis of thoracic osteoporotic compression fracture.  The purpose of the brace is to support weak muscles, stabilize and restrict movement of the trunk to aid in healing and pain relief of compression fracture.       Minal Olivarez, APRN

## 2024-04-04 NOTE — TELEPHONE ENCOUNTER
Caller: [facility]    Relationship: Other    Best call back number: [phone]     What orders are you requesting (i.e. lab or imaging): PHYSICAL THERAPY AND NURSING EVALUATION    In what timeframe would the patient need to come in: AFTER PATIENT DISCHARGE IN NEXT FEW DAYS    Where will you receive your lab/imaging services: IN HOME    Additional notes: [facility] ARE REQUESTING ORDERS FOR SKILLED NURSING AND PHYSICAL THERAPY EVALUATION TO START AFTER PATIENT IS DISCHARGED FROM [facility].    PLEASE CALL TO GIVE VERBAL ORDERS.

## 2024-04-04 NOTE — CASE MANAGEMENT/SOCIAL WORK
Discharge Planning Assessment  [facility]     Patient Name: Barbie Hernandez  MRN: [id number]  Today's Date: 4/4/2024    Admit Date: 4/3/2024    Plan: Home w/ HH (pending), family to transport   Discharge Needs Assessment       Row Name 04/04/24 1332       Living Environment    People in Home alone    Current Living Arrangements home    Primary Care Provided by self    Provides Primary Care For no one    Family Caregiver if Needed child(vito), adult;grandchild(vito), adult    Able to Return to Prior Arrangements yes       Resource/Environmental Concerns    Resource/Environmental Concerns none       Transition Planning    Patient/Family Anticipates Transition to home with help/services    Patient/Family Anticipated Services at Transition home health care    Transportation Anticipated family or friend will provide       Discharge Needs Assessment    Equipment Currently Used at Home cane, straight;walker, rolling    Concerns to be Addressed discharge planning    Discharge Facility/Level of Care Needs home with home health                   Discharge Plan       Row Name 04/04/24 6709       Plan    Plan Home w/ HH (pending), family to transport    Patient/Family in Agreement with Plan yes    Plan Comments CCP spoke with patient, son Ej, and kyler Khan at bedside; explained role, verified facesheet, and discussed dc plan. Patient uses a walker and a cane at home. She lives alone in a 1 level home with 1 step to enter. She has no history of HH or SNF. Patient walked 80ft CGA with PT.  PT is recommending HH. Patient is agreeable to HH and requesting referral to [facility]. She is agreeable to additional HH referrals as needed. Patient states plan is home w/ HH and denies any DME needs. Family to transport. JULIO, JACQUELYN                  Continued Care and Services - Admitted Since 4/3/2024       Home Medical Care       Service Provider Request Status Selected Services Address Phone Fax Patient Preferred    [facility]  Pending - Request Sent N/A [id number] [address] [phone] --                     Demographic Summary       Row Name 04/04/24 1331       General Information    Admission Type observation    Arrived From home    Referral Source admission list    Reason for Consult discharge planning    Preferred Language English       Contact Information    Permission Granted to Share Info With family/designee                   Functional Status       Row Name 04/04/24 1332       Functional Status    Usual Activity Tolerance moderate    Current Activity Tolerance moderate       Functional Status, IADL    Medications assistive equipment    Meal Preparation assistive equipment    Housekeeping assistive equipment    Laundry assistive equipment    Shopping assistive equipment       Mental Status    General Appearance WDL WDL                   Psychosocial    No documentation.                  Abuse/Neglect    No documentation.                  Legal    No documentation.                  Substance Abuse    No documentation.                  Patient Forms    No documentation.                     JACQUELYN Sebastian

## 2024-04-05 ENCOUNTER — APPOINTMENT (OUTPATIENT)
Dept: GENERAL RADIOLOGY | Facility: HOSPITAL | Age: 89
End: 2024-04-05
Payer: MEDICARE

## 2024-04-05 ENCOUNTER — TRANSCRIBE ORDERS (OUTPATIENT)
Dept: HOME HEALTH SERVICES | Facility: HOME HEALTHCARE | Age: 89
End: 2024-04-05
Payer: MEDICARE

## 2024-04-05 ENCOUNTER — HOME HEALTH ADMISSION (OUTPATIENT)
Dept: HOME HEALTH SERVICES | Facility: HOME HEALTHCARE | Age: 89
End: 2024-04-05
Payer: MEDICARE

## 2024-04-05 ENCOUNTER — READMISSION MANAGEMENT (OUTPATIENT)
Dept: CALL CENTER | Facility: HOSPITAL | Age: 89
End: 2024-04-05
Payer: MEDICARE

## 2024-04-05 ENCOUNTER — TELEPHONE (OUTPATIENT)
Dept: NEUROSURGERY | Facility: CLINIC | Age: 89
End: 2024-04-05
Payer: MEDICARE

## 2024-04-05 ENCOUNTER — DOCUMENTATION (OUTPATIENT)
Dept: HOME HEALTH SERVICES | Facility: HOME HEALTHCARE | Age: 89
End: 2024-04-05
Payer: MEDICARE

## 2024-04-05 VITALS
TEMPERATURE: 97.5 F | HEIGHT: 64 IN | OXYGEN SATURATION: 98 % | SYSTOLIC BLOOD PRESSURE: 92 MMHG | RESPIRATION RATE: 18 BRPM | DIASTOLIC BLOOD PRESSURE: 59 MMHG | BODY MASS INDEX: 21.63 KG/M2 | WEIGHT: 126.7 LBS | HEART RATE: 90 BPM

## 2024-04-05 DIAGNOSIS — S22.070A COMPRESSION FRACTURE OF T9 VERTEBRA, INITIAL ENCOUNTER: Primary | ICD-10-CM

## 2024-04-05 PROCEDURE — 97535 SELF CARE MNGMENT TRAINING: CPT

## 2024-04-05 PROCEDURE — G0378 HOSPITAL OBSERVATION PER HR: HCPCS

## 2024-04-05 PROCEDURE — 72070 X-RAY EXAM THORAC SPINE 2VWS: CPT

## 2024-04-05 PROCEDURE — 97165 OT EVAL LOW COMPLEX 30 MIN: CPT

## 2024-04-05 RX ORDER — LIDOCAINE 4 G/G
1 PATCH TOPICAL
Qty: 30 EACH | Refills: 0 | Status: SHIPPED | OUTPATIENT
Start: 2024-04-06

## 2024-04-05 RX ADMIN — CARVEDILOL 3.12 MG: 3.12 TABLET, FILM COATED ORAL at 08:12

## 2024-04-05 RX ADMIN — GABAPENTIN 100 MG: 100 CAPSULE ORAL at 12:06

## 2024-04-05 RX ADMIN — VALSARTAN 40 MG: 40 TABLET, COATED ORAL at 08:12

## 2024-04-05 RX ADMIN — GABAPENTIN 100 MG: 100 CAPSULE ORAL at 08:12

## 2024-04-05 RX ADMIN — LEVOTHYROXINE SODIUM 112 MCG: 112 TABLET ORAL at 08:12

## 2024-04-05 NOTE — DISCHARGE INSTRUCTIONS
Wear your [place] brace whenever out of bed and mobilizing.  Do not wear the brace at any time while lying down.  You should wear the brace whenever you are sitting at a higher than 45 degree angle, while standing, while walking.  Wear a soft cotton T-shirt beneath your brace to avoid any irritation and promote comfort.  Do not wear brace while in the shower.  No lift, push, pull more than 5 pounds, no swimming, no bending or twisting. No exertional or impact activity- walking is OK.     You will need to be seen in Dr. Salomon's office for follow-up appointment in 6 weeks with new x-rays.  Please come early for your appointment and go directly to [facility] radiology department and sign in for your x-rays.  You do not need an appointment.  Your x-rays are to be done with your brace on.  After you complete the x-rays, go directly to Dr. Salomon's office [address] [place].  Our office will call you with the date and time of your office appointment.  If you have any further questions or concerns please do not hesitate to call [phone].

## 2024-04-05 NOTE — PLAN OF CARE
Goal Outcome Evaluation:  Plan of Care Reviewed With: patient        Progress: no change  Outcome Evaluation: Pena cath intact for urinary retention. No c/o pain throughout night.

## 2024-04-05 NOTE — OUTREACH NOTE
Prep Survey      Flowsheet Row Responses   [employer] facility patient discharged from? [place]   Is LACE score < 7 ? Yes   Eligibility [facility]   Date of Admission 04/03/24   Date of Discharge 04/05/24   Discharge Disposition Home or Self Care   Discharge diagnosis Abdominal pain   Does the patient have one of the following disease processes/diagnoses(primary or secondary)? Other   Does the patient have Home health ordered? Yes   What is the Home health agency?  [facility]   Is there a DME ordered? No   Prep survey completed? Yes            MELISSA HELTON - Registered Nurse

## 2024-04-05 NOTE — CASE MANAGEMENT/SOCIAL WORK
Case Management Discharge Note      Final Note: home with [facility]         Selected Continued Care - Discharged on 4/5/2024 Admission date: 4/3/2024 - Discharge disposition: Home or Self Care      Destination    No services have been selected for the patient.                Durable Medical Equipment    No services have been selected for the patient.                Dialysis/Infusion    No services have been selected for the patient.                Home Medical Care       Service Provider Selected Services Address Phone Fax Patient Preferred    [facility] [address] [address] [phone] --              Therapy    No services have been selected for the patient.                Community Resources    No services have been selected for the patient.                Community & DME    No services have been selected for the patient.                    Transportation Services  Private: Car    Final Discharge Disposition Code: 06 - home with home health care

## 2024-04-05 NOTE — DISCHARGE SUMMARY
Patient Name: Barbie Hernandez  : 1932  MRN: [id number]    Date of Admission: 4/3/2024  Date of Discharge:  2024  Primary Care Physician: Tika Perry MD      Chief Complaint:   Abdominal Pain      Discharge Diagnoses     Active Hospital Problems    Diagnosis  POA    **Osteoporotic compression fracture of vertebra [M80.88XA]  Yes    Abdominal pain [R10.9]  Unknown    Acute urinary retention [R33.8]  Unknown    HTN (hypertension) [I10]  Unknown    Pain in thoracic spine [M54.6]  Yes    Chest pain [R07.9]  Yes      Resolved Hospital Problems   No resolved problems to display.        Hospital Course     Ms. Hernandez is a 89 y.o. female with a history of hypertension and osteoporosis who presented to [facility] initially complaining of right-sided chest and upper quadrant pain.  Please see the admitting history and physical for further details.  She was admitted to the hospital for further evaluation and treatment.      Fairly extensive workup was done in the emergency department.  Troponin was mildly elevated but flat and EKG did not have any evidence of ischemic changes.  The patient did not complain of left-sided chest pain.  CT abdomen/pelvis did not have any acute abnormalities but did reveal multifocal bibasilar atelectasis and/or scarring.  The patient had a negative procalcitonin and normal WBC.  She also did not exhibit respiratory symptoms.  Because of this, suspicion for pneumonia is low.  CT thoracic spine was done and did reveal an acute compression fracture involving T9.  Seems like this is the most likely source of the patient's pain, as she does have some radiation of the pain around her back into her side.  Neurosurgery was consulted to evaluate the patient.  Ultimately, no surgical intervention was indicated.  The patient declined MRI for further evaluation.  A TLSO brace was ordered, and the patient will follow-up with neurosurgery as an outpatient.  They will obtain  repeat x-rays at that time.  The patient was counseled on when to wear the brace and the appropriate instructions.  Hospital course was complicated by acute urinary retention.  A Pena catheter was placed initially.  This was removed on day of discharge and patient voided without issue.  It was ultimately felt that the urinary retention was due to pain and limited mobility, because as the patient had improvement in her mobility, she had no difficulty urinating. PT/OT evaluated the patient and felt she was appropriate for discharge home.  Home health was arranged.  The patient and family were counseled on reasons to return to the hospital and she conveyed understanding.  Consulting services agreeable with discharge home.  The patient was discharged home in good condition.    Day of Discharge     Subjective:  No acute events overnight.  Patient is sitting up in bed this morning, pleasant and conversive.  Her daughter is at bedside.  States that she is feeling pretty well.  Walked with physical therapy yesterday and pain was minimal.  She would like to be discharged home today.    Physical Exam:  Temp:  [97.5 °F (36.4 °C)-98.6 °F (37 °C)] 97.5 °F (36.4 °C)  Heart Rate:  [90-91] 90  Resp:  [18] 18  BP: ()/(57-72) 80/57  Body mass index is 21.75 kg/m².  Physical Exam  General: Alert, no acute distress.  Very pleasant and conversive.  Answers questions appropriately.  ENT: No conjunctival injection or scleral icterus. Moist mucous membranes.  Neuro: Eyes open and moving in all directions, strength normal in all extremities, no focal deficits.  Face symmetric.  Cranial nerves grossly intact.  Lungs: Clear to auscultation bilaterally. No wheeze or crackles. No distress.   Heart: RRR, no murmurs. No edema.  Abdomen: Soft, non-tender, non-distended. Normal bowel sounds.   Ext: Warm and well-perfused. No edema.   Skin: No rashes or lesions. IV site without swelling or erythema.     Consultants     Consult Orders (all)  (From admission, onward)       Start     Ordered    04/04/24 1242  Inpatient Neurosurgery Consult  Once        Specialty:  Neurosurgery  Provider:  Analia Fernando APRN    04/04/24 1241    04/03/24 1710  [facility] (on-call MD unless specified) Details  Once        Specialty:  Hospitalist  Provider:  (Not yet assigned)    04/03/24 1709                  Procedures     * Surgery not found *    Imaging Results (All)       Procedure Component Value Units Date/Time    XR Spine Thoracic 2 View [[id number]] Collected: 04/05/24 1052     Updated: 04/05/24 1057    Narrative:      XR SPINE THORACIC 2 VW-     Clinical: Known T9 compression deformity, baseline     4/4/2024 CT correlation     FINDINGS: Volume loss of the T9 vertebrae is similar to the previous CT.  There is multilevel moderate T-spine disc degeneration. There is  thoracolumbar scoliosis. Portion of the patient's brace superimposes the  chest on the frontal view.     CONCLUSION: T9 compression deformity.     This report was finalized on 4/5/2024 10:54 AM by Dr. Bteo Osullivan M.D  on Workstation: [id number]       CT Thoracic Spine Without Contrast [[id number]] Collected: 04/04/24 0944     Updated: 04/04/24 1020    Narrative:      CT THORACIC SPINE WITHOUT CONTRAST     HISTORY: Back pain.     COMPARISON: None.     FINDINGS: There are 12 thoracic rib-bearing vertebral bodies. There is  moderate dextroscoliosis of the lumbar spine with apex at level of L2  with compensatory levoscoliosis with the apex at the level of T1. There  is a mild to moderate compression deformity involving the T9 vertebral  body. Fracture planes are appreciated involving the superior endplates  and there is loss of approximately 25% of vertical height centrally.  There is no evidence of bony retropulsion. No other fractures are  identified. Vacuum disc effect is noted from T11 to L3. Evaluation of  the spinal canal is limited by technique but no obvious disc herniation  or epidural hematoma is  identified. Moderate foraminal stenosis is  present to the left at T8-T9 secondary to loss of disc height and  extension of a disc osteophyte complex into the neural foramen.       Impression:      There is a mild acute compression fracture involving the T9  vertebral body. Loss of vertical height is approximately 25% in  severity. Acute fracture planes are appreciated involving the superior  endplate. There is no evidence of bony retropulsion, no obvious disc  herniation or of an epidural hematoma. If the patient's cardiac pacer is  MR-compatible, further evaluation could be performed with a MRI  examination of the thoracic spine.        Radiation dose reduction techniques were utilized, including automated  exposure control and exposure modulation based on body size.        This report was finalized on 4/4/2024 10:17 AM by Dr. Ted Quinn M.D  on Workstation: [id number]       CT Abdomen Pelvis With Contrast [[id number]] Collected: 04/03/24 1611     Updated: 04/03/24 1624    Narrative:      CT ABDOMEN PELVIS W CONTRAST-     Date of Exam: 4/3/2024 3:37 PM     Indication: RUQ pain.     Comparison Exams: None available.     Technique: Multiple contiguous axial images were acquired through the  abdomen and pelvis following the intravenous administration of 85 mL of  Isovue-300. Reformatted coronal and sagittal sequences were also  reviewed. Radiation dose reduction techniques were utilized, including  automated exposure control and exposure modulation based on body size.     FINDINGS:  Multifocal bibasilar subsegmental atelectasis and/or scarring with  patchy predominantly peripheral groundglass opacities and interstitial  thickening in each lung base that could represent superimposed chronic  lung disease or atypical pneumonia. Calcified remnants of prior  granulomatous disease in the right lung base.     Calcified remnants of prior granulomatous disease in the liver and  spleen. Tiny subcentimeter low-density lesions  in the liver, which are  incompletely evaluated but statistically represent hepatic cysts and/or  hemangiomas. The gallbladder, pancreas, adrenal glands, and kidneys are  unremarkable. The urinary bladder, uterus, and adnexa are unremarkable  in CT appearance.     Tiny hiatal hernia. Mild colorectal stool. Colonic diverticula, without  CT evidence of diverticulitis. No bowel obstruction or significant bowel  wall thickening. The appendix is normal.     No free fluid in the abdomen or pelvis. No free intraperitoneal air. No  abdominal or pelvic lymphadenopathy is identified.     Severe rotatory thoracolumbar dextroscoliosis. Multilevel multilevel  spondylosis, most severe on the left at T12-L1 and L1-L2 and on the  right at L2-L3. No acute osseous abnormality or concerning osseous  lesion.       Impression:         1. No acute abnormality in the abdomen or pelvis.  2. Colonic diverticula, without CT evidence of diverticulitis.  3. Multifocal bibasilar subsegmental atelectasis and/or scarring with  patchy predominantly peripheral groundglass opacities and interstitial  thickening in each lung base that could represent superimposed chronic  lung disease or atypical pneumonia.  4. Thoracolumbar scoliosis and spondylosis.     This report was finalized on 4/3/2024 4:21 PM by Nik Jaeger MD on  Workstation: [id number]                 Pertinent Labs     Results from last 7 days   Lab Units 04/04/24  0338 04/03/24  1345   WBC 10*3/mm3 4.27 4.46   HEMOGLOBIN g/dL 12.3 13.0   PLATELETS 10*3/mm3 141 145     Results from last 7 days   Lab Units 04/04/24  0338 04/03/24  1345   SODIUM mmol/L 141 140   POTASSIUM mmol/L 4.7 4.4   CHLORIDE mmol/L 108* 107   CO2 mmol/L 25.3 24.2   BUN mg/dL 15 17   CREATININE mg/dL 0.78 0.83   GLUCOSE mg/dL 86 90   EGFR mL/min/1.73 71.8 66.6     Results from last 7 days   Lab Units 04/03/24  1345   ALBUMIN g/dL 3.6   BILIRUBIN mg/dL 0.5   ALK PHOS U/L 58   AST (SGOT) U/L 19   ALT (SGPT) U/L 13  "    Results from last 7 days   Lab Units 04/04/24  0338 04/03/24  1345   CALCIUM mg/dL 8.8 8.8   ALBUMIN g/dL  --  3.6     Results from last 7 days   Lab Units 04/03/24  1345   LIPASE U/L 26     Results from last 7 days   Lab Units 04/03/24  1906 04/03/24  1345   HSTROP T ng/L 16* 15*           Invalid input(s): \"LDLCALC\"          Test Results Pending at Discharge   No pending test results at time of discharge    Discharge Details        Discharge Medications        New Medications        Instructions Start Date   Lidocaine 4 %   1 patch, Transdermal, Every 24 Hours Scheduled, Remove & Discard patch within 12 hours or as directed by MD   Start Date: April 6, 2024            Continue These Medications        Instructions Start Date   bevacizumab 100 MG/4ML chemo injection  Commonly known as: AVASTIN   100 mg, Intravenous, See Admin Instructions, Every 6 to 7 weeks only in right eye        CALCIUM PO   Oral      carvedilol 3.125 MG tablet  Commonly known as: COREG   3.125 mg, Oral, 2 Times Daily      gabapentin 100 MG capsule  Commonly known as: NEURONTIN   100 mg, Oral, 4 Times Daily      latanoprost 0.005 % ophthalmic solution  Commonly known as: XALATAN       levothyroxine 112 MCG tablet  Commonly known as: SYNTHROID, LEVOTHROID   112 mcg, Oral, Daily      RECLAST IV   Intravenous, Yearly      valsartan 40 MG tablet  Commonly known as: DIOVAN   40 mg, Oral, Daily               Allergies   Allergen Reactions    Ciprofloxacin Rash    Naproxen Rash    Sulfa Antibiotics Nausea Only    Theophylline Rash    Vancomycin Rash       Discharge Disposition:  Home or Self Care      Discharge Diet:  Diet Order   Procedures    Diet: Cardiac; Healthy Heart (2-3 Na+); Fluid Consistency: Thin (IDDSI 0)       Discharge Activity: Activity as tolerated      CODE STATUS:    Code Status and Medical Interventions:   Ordered at: 04/03/24 7292     Code Status (Patient has no pulse and is not breathing):    CPR (Attempt to Resuscitate)     " Medical Interventions (Patient has pulse or is breathing):    Full       Future Appointments   Date Time Provider Department Center   7/9/2024 11:15 AM Tika Perry MD [facility]  [facility]      Follow-up Information       Tika Perry MD Follow up.    Specialty: Family Medicine  Contact information:  [address] [facility] [id number] [phone]               Tobias Salomon MD Follow up.    Specialty: Neurosurgery  Contact information:  [address] [facility] [address]  [place] [id number] [phone]                             Time Spent on Discharge:  Greater than 30 minutes      Bonny Bae MD  [facility]  04/05/24  12:49 EDT

## 2024-04-05 NOTE — THERAPY DISCHARGE NOTE
Acute Care - Occupational Therapy Discharge  [facility]    Patient Name: Barbie Hernandez  : 1932    MRN: [id number]                              Today's Date: 2024       Admit Date: 4/3/2024    Visit Dx:     ICD-10-CM ICD-9-CM   1. Right-sided chest pain  R07.9 786.50   2. History of atrial fibrillation  Z86.79 V12.59   3. History of coronary artery disease  Z86.79 V12.59   4. History of scoliosis  Z87.39 V13.59     Patient Active Problem List   Diagnosis    Other specified hypothyroidism    Paroxysmal atrial fibrillation    PPD positive    Lower back pain    Osteoporosis    Advanced atrophic nonexudative age-related macular degeneration of right eye with subfoveal involvement    Cardiomyopathy    Congestive heart failure    Diastolic dysfunction    Scoliosis    Tachycardia-bradycardia    Spondylolisthesis of lumbar region    Arthropathy of right shoulder    Senile osteoporosis    Strain of right shoulder    Therapeutic drug monitoring    Short-term memory loss    Chest pain    Thoracic compression fracture    Abdominal pain    Acute urinary retention    HTN (hypertension)    Osteoporotic compression fracture of vertebra    Pain in thoracic spine     Past Medical History:   Diagnosis Date    A-fib     Arthritis     Atrial fibrillation     Cancer     Coronary artery disease     Disease of thyroid gland     Hyperlipidemia     Low back pain     Macular degeneration     Osteoporosis      Past Surgical History:   Procedure Laterality Date    CATARACT EXTRACTION Bilateral     COLON RESECTION      INTESTINAL OBBSTRUCTION    EPIDURAL BLOCK      PACEMAKER IMPLANTATION      PYLOROMYOTOMY      UMBILICAL HERNIA REPAIR        General Information       Row Name 24 5351          OT Time and Intention    Document Type evaluation;therapy note (daily note);discharge treatment  -LE     Mode of Treatment individual therapy;occupational therapy  -LE       Row Name 24 7928          General Information     Patient Profile Reviewed yes  -LE     Prior Level of Function independent:;transfer;ADL's  -LE     Existing Precautions/Restrictions fall;spinal;brace worn when out of bed  -LE       Row Name 04/05/24 1345          Living Environment    People in Home alone  -PENNIE       Row Name 04/05/24 1345          Cognition    Orientation Status (Cognition) oriented x 4  -LE       Row Name 04/05/24 1345          Safety Issues, Functional Mobility    Comment, Safety Issues/Impairments (Mobility) non skid socks and gait belt worn.  -LE               User Key  (r) = Recorded By, (t) = Taken By, (c) = Cosigned By      Initials Name Provider Type    Minal Quinonez OTR Occupational Therapist                   Mobility/ADL's       Row Name 04/05/24 1346          Bed Mobility    Bed Mobility supine-sit;sit-supine;scooting/bridging  -LE     Supine-Sit [place] (Bed Mobility) verbal cues;set up  -LE     Sit-Supine [place] (Bed Mobility) verbal cues;set up  -LE     Comment, (Bed Mobility) OT ed on log roll and pt return demo.  -PENNIE       Row Name 04/05/24 1346          Transfers    Transfers sit-stand transfer;stand-sit transfer;bed-chair transfer;toilet transfer  -LE     Comment, (Transfers) pt sits quickly on turn.  OT ed to slow down and reach back before sitting.  -PENNIE       Row Name 04/05/24 1346          Sit-Stand Transfer    Sit-Stand [place] (Transfers) standby assist  -LE     Assistive Device (Sit-Stand Transfers) canelvis hankins  -PENNIE       Row Name 04/05/24 1346          Stand-Sit Transfer    Stand-Sit [place] (Transfers) standby assist  -LE     Assistive Device (Stand-Sit Transfers) caneelvis  -PENNIE       Row Name 04/05/24 1346          Toilet Transfer    Comment, (Toilet Transfer) pt was in bathroom with RN outside door during earlier attempt.  plan return later as RN waiting to take vitals  -PENNIE       Row Name 04/05/24 1346          Functional Mobility    Functional Mobility- Ind. Level standby assist  close  SBA.  -LE     Functional Mobility- Device cane, straight  -LE     Functional Mobility- Comment close SBA with cane bed to bathrom for shower xfer and then return to bed.  -       Row Name 04/05/24 1346          Activities of Daily Living    BADL Assessment/Intervention toileting;feeding;grooming;lower body dressing;upper body dressing;bathing  -[facility] Name 04/05/24 1346          Lower Body Dressing Assessment/Training    [place] Level (Lower Body Dressing) don;doff;socks  -LE     Comment, (Lower Body Dressing) pt return demo use of sock aid after OT demo.  Pt declines practice with reacher to thread pants.  OT demo long shoe horn, long sponge, reacher for lower body ADL.  Son present for education  -[facility] Name 04/05/24 1346          Upper Body Dressing Assessment/Training    Comment, (Upper Body Dressing) pt august brace with cueing and assist to untwist back strap.  pt doff brace without assist or cuing.  -[facility] Name 04/05/24 1346          Toileting Assessment/Training    Comment, (Toileting) pt denies assist when using bathroom.  -[facility] Name 04/05/24 1346          Shower Transfer    Type (Shower Transfer) stand pivot/stand step  -LE     [place] Level (Shower Transfer) stand by assist  close SBA.  -PENNIE     Comment, (Shower Transfer) walk in shower.  -PENNIE               User Key  (r) = Recorded By, (t) = Taken By, (c) = Cosigned By      Initials Name Provider Type    Minal Quinonez OTR Occupational Therapist                   Obj/Interventions       Row Name 04/05/24 1353          Range of Motion Comprehensive    Comment, General Range of Motion using B UE freely for ADL tasks.  -[facility] Name 04/05/24 1353          Balance    Balance Assessment sitting static balance;standing static balance;standing dynamic balance  -LE     Static Sitting Balance modified independence  -LE     Static Standing Balance standby assist  -LE     Dynamic Standing Balance standby assist  -LE      Comment, Balance no overt LOB or unsteadiness noted with cane.  -LE               User Key  (r) = Recorded By, (t) = Taken By, (c) = Cosigned By      Initials Name Provider Type    Minal Quinonez OTR Occupational Therapist                   Goals/Plan       Row Name 04/05/24 1354          Problem Specific Goal 1 (OT)    Problem Specific Goal 1 (OT) Pt t verbalize understanding of spinal precautions, AE for lower body ADL and home safety recommendations.  -LE     Time Frame (Problem Specific Goal 1, OT) 1 day  -LE     Progress/Outcome (Problem Specific Goal 1, OT) goal met  -LE               User Key  (r) = Recorded By, (t) = Taken By, (c) = Cosigned By      Initials Name Provider Type    Minal Quinonez OTR Occupational Therapist                   Clinical Impression       Row Name 04/05/24 1355          Pain Assessment    Pre/Posttreatment Pain Comment no pain when sitting EOB before august brace.   mild grimace when sit quickly on EOB.  OT ed slow descent to sit down.  -LE     Pain Intervention(s) Repositioned;Emotional support;Rest  -LE       Row Name 04/05/24 1355          Plan of Care Review    Plan of Care Reviewed With patient;son  son in and out of room on phone during OT.  -LE     Outcome Evaluation Pt admit 4/3/2024 with R chest and UQ pain.  [facility] reports to nursing pt had a fall 3/29/2024. Work up for T9 compression fracture with brace ordered and non operative treatment per AVELINA.  Pt lives alone ans uses cane or walker at baseline. Multiple attempts to see pt yesterday and today. Pt now with d/c orders to go home.  OT sees pt for education on spinal precautions and back brace use during ADL tasks. OT intro adaptive equipment and pt return demo use of AE while sitting EOB.  Pt is CGA/SBA to move OOB, august brace, august/doff socks, walk to bathroom for shower xfer.  Pt had used bathroom prior to OT and denies needing assist with toileting.  Ed pt and son on how to obtain adaptive equipment and out of pocket  expense.  Recommend home with assist and HH OT. DIscuss with RN.  -PENNIE       Row Name 04/05/24 1355          Therapy Assessment/Plan (OT)    Patient/Family Therapy Goal Statement (OT) Return to prior level of function.  -LE     Therapy Frequency (OT) evaluation only  -PENNIE       Row Name 04/05/24 1359          Therapy Plan Review/Discharge Plan (OT)    Equipment Needs Upon Discharge (OT) --  has shower chair, walker and cane.  OT recommend AE kit and ed how to obtain and out of pocket expense.  -LE     Anticipated Discharge Disposition (OT) home with assist;home with home health  -PENNIE       Row Name 04/05/24 1355          Vital Signs    O2 Delivery Pre Treatment room air  -LE     O2 Delivery Intra Treatment room air  -LE     O2 Delivery Post Treatment room air  -LE     Pre Patient Position Supine  -LE     Intra Patient Position Standing  -LE     Post Patient Position Supine  -LE       Row Name 04/05/24 1356          Positioning and Restraints    Pre-Treatment Position in bed  sitting EOB wtih son and RN.  just back from bathroom. no brace on  -LE     Post Treatment Position bed  -LE     In Bed notified nsg;fowlers;call light within reach;encouraged to call for assist;with family/caregiver;with nsg  RN going over d/c paperwork.  -LE               User Key  (r) = Recorded By, (t) = Taken By, (c) = Cosigned By      Initials Name Provider Type    Minal Quinonez OTR Occupational Therapist                   Outcome Measures       Row Name 04/05/24 6385          How much help from another is currently needed...    Putting on and taking off regular lower body clothing? 3  -LE     Bathing (including washing, rinsing, and drying) 3  -LE     Toileting (which includes using toilet bed pan or urinal) 3  -LE     Putting on and taking off regular upper body clothing 3  -LE     Taking care of personal grooming (such as brushing teeth) 3  -LE     Eating meals 4  -LE     AM-PAC 6 Clicks Score (OT) 19  -PENNIE       Row Name 04/05/24 6629           How much help from another person do you currently need...    Turning from your back to your side while in flat bed without using bedrails? 3  -RT     Moving from lying on back to sitting on the side of a flat bed without bedrails? 3  -RT     Moving to and from a bed to a chair (including a wheelchair)? 3  -RT       Row Name 04/05/24 9184          Functional Assessment    Outcome Measure Options AM-PAC 6 Clicks Daily Activity (OT)  -LE               User Key  (r) = Recorded By, (t) = Taken By, (c) = Cosigned By      Initials Name Provider Type    Minal Quinonez OTR Occupational Therapist    RT Portia Quigley RN Registered Nurse                  Occupational Therapy Education       Title: PT OT SLP Therapies (Done)       Topic: Occupational Therapy (Done)       Point: ADL training (Done)       Description:   Instruct learner(s) on proper safety adaptation and remediation techniques during self care or transfers.   Instruct in proper use of assistive devices.                  Learning Progress Summary             Patient Acceptance, E,TB,D, DU,VU by PENNIE at 4/5/2024 1358    Comment: role of OT,  plan of care, AE for lower body ADL, spinal precuations, recommend hold off on showering until feels ready to attempt and to have assist at least initially.   purpose of brace and order to wear when OOB.   Family Acceptance, E,TB,D, DU,VU by PENNIE at 4/5/2024 1358    Comment: role of OT,  plan of care, AE for lower body ADL, spinal precuations, recommend hold off on showering until feels ready to attempt and to have assist at least initially.   purpose of brace and order to wear when OOB.                         Point: Precautions (Done)       Description:   Instruct learner(s) on prescribed precautions during self-care and functional transfers.                  Learning Progress Summary             Patient Acceptance, E,TB,D, DU,VU by PENNIE at 4/5/2024 1358    Comment: role of OT,  plan of care, AE for lower body ADL,  spinal precuations, recommend hold off on showering until feels ready to attempt and to have assist at least initially.   purpose of brace and order to wear when OOB.   Family Acceptance, E,TB,D, DU,VU by PENNIE at 4/5/2024 1358    Comment: role of OT,  plan of care, AE for lower body ADL, spinal precuations, recommend hold off on showering until feels ready to attempt and to have assist at least initially.   purpose of brace and order to wear when OOB.                         Point: Body mechanics (Done)       Description:   Instruct learner(s) on proper positioning and spine alignment during self-care, functional mobility activities and/or exercises.                  Learning Progress Summary             Patient Acceptance, E,TB,D, DU,VU by PENNIE at 4/5/2024 1358    Comment: role of OT,  plan of care, AE for lower body ADL, spinal precuations, recommend hold off on showering until feels ready to attempt and to have assist at least initially.   purpose of brace and order to wear when OOB.   Family Acceptance, E,TB,D, DU,VU by PENNIE at 4/5/2024 1358    Comment: role of OT,  plan of care, AE for lower body ADL, spinal precuations, recommend hold off on showering until feels ready to attempt and to have assist at least initially.   purpose of brace and order to wear when OOB.                                         User Key       Initials Effective Dates Name Provider Type Discipline    PENNIE 06/16/21 -  Minal Worthy OTR Occupational Therapist OT                  OT Recommendation and Plan  Therapy Frequency (OT): evaluation only  Plan of Care Review  Plan of Care Reviewed With: patient, son (son in and out of room on phone during OT.)  Outcome Evaluation: Pt admit 4/3/2024 with R chest and UQ pain.  Dght reports to nursing pt had a fall 3/29/2024. Work up for T9 compression fracture with brace ordered and non operative treatment per AVELINA.  Pt lives alone ans uses cane or walker at baseline. Multiple attempts to see pt yesterday  and today. Pt now with d/c orders to go home.  OT sees pt for education on spinal precautions and back brace use during ADL tasks. OT intro adaptive equipment and pt return demo use of AE while sitting EOB.  Pt is CGA/SBA to move OOB, august brace, august/doff socks, walk to bathroom for shower xfer.  Pt had used bathroom prior to OT and denies needing assist with toileting.  Ed pt and son on how to obtain adaptive equipment and out of pocket expense.  Recommend home with assist and HH OT. DIscuss with RN.  Plan of Care Reviewed With: patient, son (son in and out of room on phone during OT.)  Outcome Evaluation: Pt admit 4/3/2024 with R chest and UQ pain.  [facility] reports to nursing pt had a fall 3/29/2024. Work up for T9 compression fracture with brace ordered and non operative treatment per AVELINA.  Pt lives alone ans uses cane or walker at baseline. Multiple attempts to see pt yesterday and today. Pt now with d/c orders to go home.  OT sees pt for education on spinal precautions and back brace use during ADL tasks. OT intro adaptive equipment and pt return demo use of AE while sitting EOB.  Pt is CGA/SBA to move OOB, august brace, august/doff socks, walk to bathroom for shower xfer.  Pt had used bathroom prior to OT and denies needing assist with toileting.  Ed pt and son on how to obtain adaptive equipment and out of pocket expense.  Recommend home with assist and HH OT. DIscuss with RN.     Time Calculation:   Evaluation Complexity (OT)  Review Occupational Profile/Medical/Therapy History Complexity: brief/low complexity  Assessment, Occupational Performance/Identification of Deficit Complexity: 1-3 performance deficits  Clinical Decision Making Complexity (OT): problem focused assessment/low complexity  Overall Complexity of Evaluation (OT): low complexity     Time Calculation- OT       Row Name 04/05/24 1359             Time Calculation- OT    OT Start Time 1314  -LE      OT Stop Time 1332  3 additional attempts and  discussion with nursing both yesteray and today.  Held due to orders for testing, AVELINA to see.  additionally talked with CCP about d/c recommendations  -LE      OT Time Calculation (min) 18 min  -LE      Total Timed Code Minutes- OT 8 minute(s)  -LE      OT Received On 04/05/24  -LE      OT - Next Appointment 04/08/24  -LE         Timed Charges    24581 - OT Self Care/Mgmt Minutes 8  -LE         Total Minutes    Timed Charges Total Minutes 8  -LE       Total Minutes 8  -LE                User Key  (r) = Recorded By, (t) = Taken By, (c) = Cosigned By      Initials Name Provider Type    LE Minal Worthy OTR Occupational Therapist                  Therapy Charges for Today       Code Description Service Date Service Provider Modifiers Qty    [id number]  OT SELF CARE/MGMT/TRAIN EA 15 MIN 4/5/2024 Minal Worthy OTR GO 1    [id number]  OT EVAL LOW COMPLEXITY 3 4/5/2024 Minal Worthy OTR GO 1               OT Discharge Summary  Anticipated Discharge Disposition (OT): home with assist, home with home health    IRA Calvo  4/5/2024

## 2024-04-05 NOTE — PLAN OF CARE
Goal Outcome Evaluation:  Plan of Care Reviewed With: patient, son (son in and out of room on phone during OT.)           Outcome Evaluation: Pt admit 4/3/2024 with R chest and UQ pain.  [facility] reports to nursing pt had a fall 3/29/2024. Work up for T9 compression fracture with brace ordered and non operative treatment per AVELINA.  Pt lives alone ans uses cane or walker at baseline. Multiple attempts to see pt yesterday and today. Pt now with d/c orders to go home.  OT sees pt for education on spinal precautions and back brace use during ADL tasks. OT intro adaptive equipment and pt return demo use of AE while sitting EOB.  Pt is CGA/SBA to move OOB, august brace, august/doff socks, walk to bathroom for shower xfer.  Pt had used bathroom prior to OT and denies needing assist with toileting.  Ed pt and son on how to obtain adaptive equipment and out of pocket expense.  Recommend home with assist and HH OT. DIscuss with RN.      Anticipated Discharge Disposition (OT): home with assist, home with home health

## 2024-04-05 NOTE — PROGRESS NOTES
[facility] to provide Home Care services. Patient agreeable and denies other HH at this time. PCP and contact information confirmed. Patient is [facility] and her cell phone works best to schedule HH visits. Son Ej can be back up contact at [phone] or daughter Marisol, [phone]. VO received from Buzz for Dr Perry to [place] for HH.

## 2024-04-05 NOTE — DISCHARGE PLACEMENT REQUEST
"Barbie Hernandez (89 y.o. Female)       Date of Birth   09/28/1932    Social Security Number       Address   [address]  [place] [id number]    Home Phone   [phone]    MRN   [id number]       [employer]   [employer]    Marital Status                               Admission Date   4/3/24    Admission Type   Emergency    Admitting Provider   Alana Spencer MD    Attending Provider   Bonny Bae MD    Department, Room/Bed   [address], [address]/1       Discharge Date       Discharge Disposition       Discharge Destination                                 Attending Provider: Bonny Bae MD    Allergies: Ciprofloxacin, Naproxen, Sulfa Antibiotics, Theophylline, Vancomycin    Isolation: None   Infection: None   Code Status: CPR    Ht: 162.6 cm (64\")   Wt: 57.5 kg (126 lb 11.2 oz)    Admission Cmt: None   Principal Problem: Osteoporotic compression fracture of vertebra [M80.88XA]                   Active Insurance as of 4/3/2024       Primary Coverage       Payor Plan Insurance Group Employer/Plan Group    [facility] MEDICARE REPLACEMENT [facility] GROUP [id number]       Payor Plan Address Payor Plan Phone Number Payor Plan Fax Number Effective Dates    [address] [address]   1/1/2023 - None Entered    [facility] [id number]         Subscriber Name Subscriber Birth Date Member ID       BARBIE HERNANDEZ 9/28/1932 [id number]                     Emergency Contacts        (Rel.) Home Phone Work Phone Mobile Phone    Marisol Salazar (Daughter) [phone] -- --                "

## 2024-04-05 NOTE — TELEPHONE ENCOUNTER
----- Message from Nehemiah Santos RegShukried Rep sent at 4/5/2024 11:47 AM EDT -----  Regarding: FW: please schedule  Minal Horton sent me this but patient will need to be schedule with an APC dr. Salomon doesn't see for this type of thing  ----- Message -----  From: Tobias Salomon MD  Sent: 4/5/2024  11:42 AM EDT  To: Nehemiah Santos RegSched   Subject: RE: please schedule                              APC  ----- Message -----  From: Nehemiah Santos RegSched Rep  Sent: 4/5/2024  11:42 AM EDT  To: Tobias CHRISTIANSON MD  Subject: FW: please schedule                              Do you want me to schedule this patient with you or the APC?  ----- Message -----  From: Minal Olivarez APRN  Sent: 4/4/2024   8:21 PM EDT  To: Avani Morris  Subject: please schedule                                  Please arrange follow-up visit in 6 weeks with AP and lateral thoracic spine x-rays in brace (I have ordered).  Contact patient once the appointment has been made.  Thanks

## 2024-04-06 ENCOUNTER — HOME CARE VISIT (OUTPATIENT)
Dept: HOME HEALTH SERVICES | Facility: HOME HEALTHCARE | Age: 89
End: 2024-04-06
Payer: MEDICARE

## 2024-04-06 VITALS
OXYGEN SATURATION: 96 % | SYSTOLIC BLOOD PRESSURE: 127 MMHG | DIASTOLIC BLOOD PRESSURE: 86 MMHG | TEMPERATURE: 98.3 F | HEART RATE: 92 BPM | RESPIRATION RATE: 18 BRPM

## 2024-04-06 PROCEDURE — G0151 HHCP-SERV OF PT,EA 15 MIN: HCPCS | Performed by: PHYSICAL THERAPIST

## 2024-04-06 NOTE — CASE COMMUNICATION
Home Health ordered for: disciplines physical therapy                  Reason for Hosp/Primary Dx: Patient was admitted from 4/3-4/5 due to osteoporotic compression fracture of vertebra T9, abdominal pain, HTN. Patient came to the ER complaining of right sided chest pain found a T9 vetebral fracture non surgical so a TLSO was ordered.  Patient reports that she had a fall on 3/29/24 when she got out of her bed and fell and got wedge betw een the dresser.                 Co-morbidities: osteoporosis, arthritis, atrial fibrillation, CAD, macular gegeneration, hyperlipidemia, low back pain, history of cancer, hx of falls                   Focus of Care:  Skilled physical therapy to focus on increasing her strength, transfer training, pain symptom management, medication management, fall risk safety, gait training following recent T9 vertebral fracture           Current Func tional status/mobility/DME: SPC for ambulation with riser on commode.  Patient also has a couch handhold to help get up from the couch.            HB status/Living Arrangements: Patient lives by herself with her daughter living close in a single story patio home                  Skin Integrity/wound status: no issues                   Code Status: full code                  Fall Risk: moderate                   MD follow up: neurosurgeo n visit to be made

## 2024-04-06 NOTE — HOME HEALTH
Home Health ordered for: disciplines physical therapy                  Reason for Hosp/Primary Dx: Patient was admitted from 4/3-4/5 due to osteoporotic compression fracture of vertebra T9, abdominal pain, HTN. Patient came to the ER complaining of right sided chest pain found a T9 vetebral fracture non surgical so a TLSO was ordered.  Patient reports that she had a fall on 3/29/24 when she got out of her bed and fell and got wedge between the dresser.                 Co-morbidities: osteoporosis, arthritis, atrial fibrillation, CAD, macular gegeneration, hyperlipidemia, low back pain, history of cancer, hx of falls                   Focus of Care:  Skilled physical therapy to focus on increasing her strength, transfer training, pain symptom management, medication management, fall risk safety, gait training following recent T9 vertebral fracture           Current Functional status/mobility/DME: SPC for ambulation with riser on commode.  Patient also has a couch handhold to help get up from the couch.            HB status/Living Arrangements: Patient lives by herself with her daughter living close in a single story patio home                  Skin Integrity/wound status: no issues                   Code Status: full code                  Fall Risk: moderate                   MD follow up: neurosurgeon visit to be made    Plan For Next Visit:  - gait training  - standing HEP

## 2024-04-06 NOTE — CASE COMMUNICATION
Dear Dr. Perry,    Mrs. Hernandez was admitted to [facility] following recent hospitalization due to a T9 vertebral fracture with impaired mobility after a fall.  Patient to be seen 1w1, 2w4 to increased her strenght, balance, gait training, fall risk safety, transfer training, and pain symptom management.    Thank you,  Krista Lynn, PT, DPT  [facility]

## 2024-04-08 ENCOUNTER — HOME CARE VISIT (OUTPATIENT)
Dept: HOME HEALTH SERVICES | Facility: HOME HEALTHCARE | Age: 89
End: 2024-04-08
Payer: MEDICARE

## 2024-04-08 ENCOUNTER — TRANSITIONAL CARE MANAGEMENT TELEPHONE ENCOUNTER (OUTPATIENT)
Dept: CALL CENTER | Facility: HOSPITAL | Age: 89
End: 2024-04-08
Payer: MEDICARE

## 2024-04-08 NOTE — TELEPHONE ENCOUNTER
[address] [facility] [address] [address]        Pt Name: Ehsan Montgomery  MRN: [id number]  [address] [facility] [address]  Date of evaluation: 12/9/2023  Provider: Nanda Wray PA-C  PCP: No primary care provider on file. Note Started: 7:17 PM EST 12/9/23      RENETTA. I have evaluated this patient. CHIEF COMPLAINT       Chief Complaint   Patient presents with    Emesis     X 4 days with right lower back pain       HISTORY OF PRESENT ILLNESS: 1 or more Elements     History From: pt    Ehsan Montgomery is a 21 y.o. female who presents complaining of right lower back pain and vomiting. Patient reports 4 days of right lower back pain, started vomiting 2 days ago. Last normal menstrual cycle was November 21, denies pregnancy. Pain not worse with moving, no alleviating factors. No medications taken for this currently. Denies fever, abdominal pain, dysuria, hematuria, constipation, diarrhea, prior abdominal surgery. Nursing Notes were all reviewed and agreed with or any disagreements were addressed in the HPI. REVIEW OF SYSTEMS :      Review of Systems   All other systems reviewed and are negative. Positives and Pertinent negatives as per HPI. PAST MEDICAL HISTORY    has a past medical history of Pilonidal cyst (07/2022) and Seizures ([address]) (07/2022).      SURGICAL HISTORY     Past Surgical History:   Procedure Laterality Date    PILONIDAL CYST EXCISION  2015    PILONIDAL CYST EXCISION N/A 7/22/2022    EXCISION OF PILONIDAL CYST / PIT PICKING performed by Tereso Saez MD at [address] [place] [address]       Current Discharge Medication List        CONTINUE these medications which have NOT CHANGED    Details   Prenatal Vit-Fe Fumarate-FA (PRENATAL VITAMIN) 27-0.8 MG TABS Take 1 tablet by mouth daily  Qty: 30 tablet, Refills: 0      levETIRAcetam (KEPPRA) 500 MG tablet Take 1 tablet by mouth 2 times daily  Qty: 60 tablet, Refills: 0 Lvm to schedule 6 week f/u (05/16/2024) with xrays

## 2024-04-08 NOTE — TELEPHONE ENCOUNTER
Talked to daughter Yesy [phone] who states she wants mother to see Dr. Jo as she wants mother to get MRI and see if they are a candidate for Kyphoplasty. If she is then they would like to proceed with surgery. Please call and advise.

## 2024-04-08 NOTE — TELEPHONE ENCOUNTER
DAUGHTER CARLOS CALLED, ADVISE ALREADY SPOKE TO SISTER TODAY. ADVISE TO CONTACT HER FOR UPDATE ON SCHEDULING.

## 2024-04-08 NOTE — OUTREACH NOTE
Call Center TCM Note      Flowsheet Row Responses   [facility] patient discharged from? [place]   Does the patient have one of the following disease processes/diagnoses(primary or secondary)? Other   TCM attempt successful? Yes   Call start time 1103   Call end time 1106   Discharge diagnosis Abdominal pain   Meds reviewed with patient/caregiver? Yes   Does the patient have all medications ordered at discharge? No   What is keeping the patient from filling the prescriptions? Script on hold per patient   Is the patient taking all medications as directed (includes completed medication regime)? Yes   Does the patient have an appointment with their PCP within 7-14 days of discharge? No   Nursing Interventions Patient declined scheduling/rescheduling appointment at this time   What is the Home health agency?  [facility]   Has home health visited the patient within 72 hours of discharge? Call prior to 72 hours   Psychosocial issues? No   Did the patient receive a copy of their discharge instructions? Yes   Nursing interventions Reviewed instructions with patient   What is the patient's perception of their health status since discharge? Improving   Is the patient/caregiver able to teach back signs and symptoms related to disease process for when to call PCP? Yes   Is the patient/caregiver able to teach back signs and symptoms related to disease process for when to call 911? Yes   Is the patient/caregiver able to teach back the hierarchy of who to call/visit for symptoms/problems? PCP, Specialist, Home health nurse, Urgent Care, ED, 911 Yes   If the patient is a current smoker, are they able to teach back resources for cessation? Not a smoker   TCM call completed? Yes   Call end time 1106   Would this patient benefit from a Referral to Amb Social Work? No   Is the patient interested in additional calls from an ambulatory ? No            Nancy Tolliver LPN    4/8/2024, 11:13 EDT

## 2024-04-09 ENCOUNTER — TELEPHONE (OUTPATIENT)
Dept: NEUROSURGERY | Facility: CLINIC | Age: 89
End: 2024-04-09
Payer: MEDICARE

## 2024-04-09 DIAGNOSIS — M80.88XA OSTEOPOROTIC COMPRESSION FRACTURE OF VERTEBRA, INITIAL ENCOUNTER: ICD-10-CM

## 2024-04-09 DIAGNOSIS — M80.88XA OSTEOPOROTIC COMPRESSION FRACTURE OF VERTEBRA, INITIAL ENCOUNTER: Primary | ICD-10-CM

## 2024-04-09 DIAGNOSIS — M54.6 PAIN IN THORACIC SPINE: Primary | ICD-10-CM

## 2024-04-09 DIAGNOSIS — M54.6 PAIN IN THORACIC SPINE: ICD-10-CM

## 2024-04-09 NOTE — TELEPHONE ENCOUNTER
Dr. Jo received a message on this patient and asked me to contact the daughter who had called. I spoke to the patient's daughter, Yesy [phone]. The patient has been wearing the TLSO brace but is still having pain when standing.     She is requesting that her mother be seen by Dr. Jo due to a previous encounter with Dr. Jo re: another family member. The patient is currently being treated for UTI and has had some worsening confusion due to this and the pain.     I informed Yesy that her mother cannot have an MRI due to a pacemaker that she has had for many years. The fall is over a week old and therefore, a bone scan could be helpful in determining fracture acuteness. Dr. Jo agreed to see this patient after the bone scan to discuss possible kyphoplasty further based on the patient's progress over time and the bone scan results.     I have ordered the bone scan and I informed Yesy that our office will call to schedule the appointment with Dr. Jo after bone scan. For now, I reiterated continued use of the brace when patient is out of bed.

## 2024-04-09 NOTE — CASE COMMUNICATION
Vangie Briscoe,    Ms. Hernandez was admitted to  PT following a T9 thoracic compression fx, the family wanted her to go to rehab facility however she was able walk too far so she did not qualify.  She was doing well when I admitted her however her family was going to [place] on wednesday for a trip and were wanting some respite care assistance, saturday night she developed a UTI and has become progressively weaker and her daughter does not fe el like it is safe for her in the home and needs to go to a rehab facility.     I spoke with the daughter Marisol and informed her that we got MSW orders and that you would be going to see her tomorrow.

## 2024-04-09 NOTE — H&P (VIEW-ONLY)
Subjective   Patient ID: Barbie Hernandez is a 89 y.o. female is here today for follow-up after bone scan    This very nice lady is with her daughter.  Her other daughter who was on the cell phone.  She has not pacemaker that is not MRI compatible.  She has a history of osteoporosis and her GYN doctor, Lucia Pascual, has her on Reclast.  She fell about 12 days ago and began having pain shortly after in the thoracic region that initially radiated to the right side but since discharge radiated to the left.  She does not have much pain if she lies down or sits down but it is quite severe when she is standing.  She was put in a [place] brace when she was seen by our service.  Her daughter wanted my opinion about a kyphoplasty.  She is not on any blood thinners.  She has been followed by Dr. Faizan boyer cardiologist downtown and she has the pacemaker.  She is not on any blood thinners.  She does not have any neurological deficits and she is tender to palpation in the thoracic region as expected.  The brace is quite uncomfortable in the timeframe for her getting better is probably in the 3 to 6-month timeframe which is quite a bit considering the pain that she is in so I think a kyphoplasty at T9 is very reasonable.  We would need to get cardiac clearance.  She would not need to use the brace afterwards.  The chances are reasonable in the 80 to 90% range that this will be helpful but I again stressed that this is not a treatment for osteoporosis and that she would need to continue taking her medications and be monitored by Dr. Pascual.  I also told her there is a risk of additional fractures in the future on the basis of her osteoporosis that could be treated this way if necessary.        History of Present Illness    The following portions of the patient's history were reviewed and updated as appropriate: allergies, current medications, past family history, past medical history, past social history, past surgical history, and  "problem list.    Review of Systems   Constitutional:  Negative for fever.   Musculoskeletal:  Positive for gait problem. Negative for back pain.   Neurological:  Positive for weakness (right arm). Negative for numbness.   All other systems reviewed and are negative.          Objective     Vitals:    04/12/24 1348   BP: 100/62   BP Location: Right arm   Patient Position: Sitting   Cuff Size: Adult   Resp: 20   Weight: 57.2 kg (126 lb)   Height: 162.6 cm (64.02\")   PainSc: 0-No pain     Body mass index is 21.62 kg/m².    Tobacco Use: Low Risk  (4/12/2024)    Patient History     Smoking Tobacco Use: Never     Smokeless Tobacco Use: Never     Passive Exposure: Not on file          Physical Exam  Constitutional:       Appearance: She is well-developed.   HENT:      Head: Normocephalic and atraumatic.   Eyes:      Extraocular Movements: EOM normal.      Conjunctiva/sclera: Conjunctivae normal.      Pupils: Pupils are equal, round, and reactive to light.   Neck:      Vascular: No carotid bruit.   Neurological:      Mental Status: She is oriented to person, place, and time.      Coordination: Finger-Nose-Finger Test and Heel to Shin Test normal.      Gait: Gait is intact.      Deep Tendon Reflexes:      Reflex Scores:       Tricep reflexes are 2+ on the right side and 2+ on the left side.       Bicep reflexes are 2+ on the right side and 2+ on the left side.       Brachioradialis reflexes are 2+ on the right side and 2+ on the left side.       Patellar reflexes are 2+ on the right side and 2+ on the left side.       Achilles reflexes are 2+ on the right side and 2+ on the left side.  Psychiatric:         Speech: Speech normal.       Neurologic Exam     Mental Status   Oriented to person, place, and time.   Registration of memory: Good recent and remote memory.   Attention: normal. Concentration: normal.   Speech: speech is normal   Level of consciousness: alert  Knowledge: consistent with education.     Cranial Nerves "     CN II   Visual fields full to confrontation.   Visual acuity: normal    CN III, IV, VI   Pupils are equal, round, and reactive to light.  Extraocular motions are normal.     CN V   Facial sensation intact.   Right corneal reflex: normal  Left corneal reflex: normal    CN VII   Facial expression full, symmetric.   Right facial weakness: none  Left facial weakness: none    CN VIII   Hearing: intact    CN IX, X   Palate: symmetric    CN XI   Right sternocleidomastoid strength: normal  Left sternocleidomastoid strength: normal    CN XII   Tongue: not atrophic  Tongue deviation: none    Motor Exam   Muscle bulk: normal  Right arm tone: normal  Left arm tone: normal  Right leg tone: normal  Left leg tone: normal    Strength   Strength 5/5 except as noted.     Sensory Exam   Light touch normal.     Gait, Coordination, and Reflexes     Gait  Gait: normal    Coordination   Finger to nose coordination: normal  Heel to shin coordination: normal    Reflexes   Right brachioradialis: 2+  Left brachioradialis: 2+  Right biceps: 2+  Left biceps: 2+  Right triceps: 2+  Left triceps: 2+  Right patellar: 2+  Left patellar: 2+  Right achilles: 2+  Left achilles: 2+  Right : 2+  Left : 2+          Assessment & Plan   Independent Review of Radiographic Studies:      I personally reviewed the images from the following studies.    The CT scan of the thoracic spine done on 4/4/2024 shows a probable acute T9 compression fracture with about 25% collapse and no retropulsion.  The bone scan done yesterday on 4/11/2024 show acute uptake at T9 indicating the acuity of the fracture.  Agree with the report.        Medical Decision Making:      Will need to get insurance approval and cardiac clearance from Dr. Gloria.  I described and recommended a T9 kyphoplasty.  The goal of surgery is stabilization of the fracture to decrease pain and improve the quality of life.  The patient knows that the surgery will not prevent another fracture and  that he or she may need additional surgery in the future.  The risks include, but are not limited to, infection, hemorrhage requiring transfusion or reoperation, extravasation of cement causing spinal cord injury, pulmonary embolus, incomplete relief of symptoms, potential need for additional surgeries in the future, stroke, paralysis, coma, and death.  The patient agrees to proceed.     She is not on any blood thinners.  I would keep her overnight and have PT mobilizer that day.  I asked her to use the brace until the day of the surgery.    Diagnoses and all orders for this visit:    1. Crush fracture of vertebra due to osteoporosis with delayed healing, subsequent encounter (Primary)  -     Case Request; Standing  -     ceFAZolin (ANCEF) 2 g in sodium chloride 0.9 % 100 mL IVPB  -     Case Request    2. Acute bilateral thoracic back pain  -     Case Request; Standing  -     ceFAZolin (ANCEF) 2 g in sodium chloride 0.9 % 100 mL IVPB  -     Case Request    3. Chronic bilateral low back pain with bilateral sciatica    4. DDD (degenerative disc disease), lumbar    Other orders  -     Outpatient In A Bed; Standing  -     Follow Anesthesia Guidelines / Protocol; Future  -     Follow Anesthesia Guidelines / Protocol; Standing  -     Verify / Perform Chlorhexidine Skin Prep; Standing  -     Provide Patient With Instructions on NPO Status; Future  -     Provide Chlorhexidine Skin Prep Wipes and Instructions; Future      Return for 7 to 10 days after surgery with an APC.

## 2024-04-09 NOTE — PROGRESS NOTES
Subjective   Patient ID: Barbie Hernandez is a 89 y.o. female is here today for follow-up after bone scan    This very nice lady is with her daughter.  Her other daughter who was on the cell phone.  She has not pacemaker that is not MRI compatible.  She has a history of osteoporosis and her GYN doctor, Lucia Pascual, has her on Reclast.  She fell about 12 days ago and began having pain shortly after in the thoracic region that initially radiated to the right side but since discharge radiated to the left.  She does not have much pain if she lies down or sits down but it is quite severe when she is standing.  She was put in a [place] brace when she was seen by our service.  Her daughter wanted my opinion about a kyphoplasty.  She is not on any blood thinners.  She has been followed by Dr. Faizan boyer cardiologist downtown and she has the pacemaker.  She is not on any blood thinners.  She does not have any neurological deficits and she is tender to palpation in the thoracic region as expected.  The brace is quite uncomfortable in the timeframe for her getting better is probably in the 3 to 6-month timeframe which is quite a bit considering the pain that she is in so I think a kyphoplasty at T9 is very reasonable.  We would need to get cardiac clearance.  She would not need to use the brace afterwards.  The chances are reasonable in the 80 to 90% range that this will be helpful but I again stressed that this is not a treatment for osteoporosis and that she would need to continue taking her medications and be monitored by Dr. Pascual.  I also told her there is a risk of additional fractures in the future on the basis of her osteoporosis that could be treated this way if necessary.        History of Present Illness    The following portions of the patient's history were reviewed and updated as appropriate: allergies, current medications, past family history, past medical history, past social history, past surgical history, and  "problem list.    Review of Systems   Constitutional:  Negative for fever.   Musculoskeletal:  Positive for gait problem. Negative for back pain.   Neurological:  Positive for weakness (right arm). Negative for numbness.   All other systems reviewed and are negative.          Objective     Vitals:    04/12/24 1348   BP: 100/62   BP Location: Right arm   Patient Position: Sitting   Cuff Size: Adult   Resp: 20   Weight: 57.2 kg (126 lb)   Height: 162.6 cm (64.02\")   PainSc: 0-No pain     Body mass index is 21.62 kg/m².    Tobacco Use: Low Risk  (4/12/2024)    Patient History     Smoking Tobacco Use: Never     Smokeless Tobacco Use: Never     Passive Exposure: Not on file          Physical Exam  Constitutional:       Appearance: She is well-developed.   HENT:      Head: Normocephalic and atraumatic.   Eyes:      Extraocular Movements: EOM normal.      Conjunctiva/sclera: Conjunctivae normal.      Pupils: Pupils are equal, round, and reactive to light.   Neck:      Vascular: No carotid bruit.   Neurological:      Mental Status: She is oriented to person, place, and time.      Coordination: Finger-Nose-Finger Test and Heel to Shin Test normal.      Gait: Gait is intact.      Deep Tendon Reflexes:      Reflex Scores:       Tricep reflexes are 2+ on the right side and 2+ on the left side.       Bicep reflexes are 2+ on the right side and 2+ on the left side.       Brachioradialis reflexes are 2+ on the right side and 2+ on the left side.       Patellar reflexes are 2+ on the right side and 2+ on the left side.       Achilles reflexes are 2+ on the right side and 2+ on the left side.  Psychiatric:         Speech: Speech normal.       Neurologic Exam     Mental Status   Oriented to person, place, and time.   Registration of memory: Good recent and remote memory.   Attention: normal. Concentration: normal.   Speech: speech is normal   Level of consciousness: alert  Knowledge: consistent with education.     Cranial Nerves "     CN II   Visual fields full to confrontation.   Visual acuity: normal    CN III, IV, VI   Pupils are equal, round, and reactive to light.  Extraocular motions are normal.     CN V   Facial sensation intact.   Right corneal reflex: normal  Left corneal reflex: normal    CN VII   Facial expression full, symmetric.   Right facial weakness: none  Left facial weakness: none    CN VIII   Hearing: intact    CN IX, X   Palate: symmetric    CN XI   Right sternocleidomastoid strength: normal  Left sternocleidomastoid strength: normal    CN XII   Tongue: not atrophic  Tongue deviation: none    Motor Exam   Muscle bulk: normal  Right arm tone: normal  Left arm tone: normal  Right leg tone: normal  Left leg tone: normal    Strength   Strength 5/5 except as noted.     Sensory Exam   Light touch normal.     Gait, Coordination, and Reflexes     Gait  Gait: normal    Coordination   Finger to nose coordination: normal  Heel to shin coordination: normal    Reflexes   Right brachioradialis: 2+  Left brachioradialis: 2+  Right biceps: 2+  Left biceps: 2+  Right triceps: 2+  Left triceps: 2+  Right patellar: 2+  Left patellar: 2+  Right achilles: 2+  Left achilles: 2+  Right : 2+  Left : 2+          Assessment & Plan   Independent Review of Radiographic Studies:      I personally reviewed the images from the following studies.    The CT scan of the thoracic spine done on 4/4/2024 shows a probable acute T9 compression fracture with about 25% collapse and no retropulsion.  The bone scan done yesterday on 4/11/2024 show acute uptake at T9 indicating the acuity of the fracture.  Agree with the report.        Medical Decision Making:      Will need to get insurance approval and cardiac clearance from Dr. Gloria.  I described and recommended a T9 kyphoplasty.  The goal of surgery is stabilization of the fracture to decrease pain and improve the quality of life.  The patient knows that the surgery will not prevent another fracture and  that he or she may need additional surgery in the future.  The risks include, but are not limited to, infection, hemorrhage requiring transfusion or reoperation, extravasation of cement causing spinal cord injury, pulmonary embolus, incomplete relief of symptoms, potential need for additional surgeries in the future, stroke, paralysis, coma, and death.  The patient agrees to proceed.     She is not on any blood thinners.  I would keep her overnight and have PT mobilizer that day.  I asked her to use the brace until the day of the surgery.    Diagnoses and all orders for this visit:    1. Crush fracture of vertebra due to osteoporosis with delayed healing, subsequent encounter (Primary)  -     Case Request; Standing  -     ceFAZolin (ANCEF) 2 g in sodium chloride 0.9 % 100 mL IVPB  -     Case Request    2. Acute bilateral thoracic back pain  -     Case Request; Standing  -     ceFAZolin (ANCEF) 2 g in sodium chloride 0.9 % 100 mL IVPB  -     Case Request    3. Chronic bilateral low back pain with bilateral sciatica    4. DDD (degenerative disc disease), lumbar    Other orders  -     Outpatient In A Bed; Standing  -     Follow Anesthesia Guidelines / Protocol; Future  -     Follow Anesthesia Guidelines / Protocol; Standing  -     Verify / Perform Chlorhexidine Skin Prep; Standing  -     Provide Patient With Instructions on NPO Status; Future  -     Provide Chlorhexidine Skin Prep Wipes and Instructions; Future      Return for 7 to 10 days after surgery with an APC.

## 2024-04-10 ENCOUNTER — HOME CARE VISIT (OUTPATIENT)
Dept: HOME HEALTH SERVICES | Facility: HOME HEALTHCARE | Age: 89
End: 2024-04-10
Payer: MEDICARE

## 2024-04-10 VITALS — TEMPERATURE: 97.3 F | RESPIRATION RATE: 18 BRPM | DIASTOLIC BLOOD PRESSURE: 69 MMHG | SYSTOLIC BLOOD PRESSURE: 113 MMHG

## 2024-04-10 PROCEDURE — G0151 HHCP-SERV OF PT,EA 15 MIN: HCPCS | Performed by: PHYSICAL THERAPIST

## 2024-04-10 PROCEDURE — G0155 HHCP-SVS OF CSW,EA 15 MIN: HCPCS

## 2024-04-10 NOTE — HOME HEALTH
"Subjective: \"I am having a really hard time getting in and out of the bed and can not do it on my own, help in the bathroom    Changes in Medications: added in antibiotic for UTI    Any Falls Since Last Visit: none    Assessment: Patient has had a slight decline in status due to a UTI, patient has been having increased difficulty with getting in and out of bed requiring min A to get out of bed during the day and per family she is needing more help at night to get out of the bed and is getting out of bed 4 times a day. She is more unsteady when walking around her home due to impaired strength and balance, she does not use a walker and just uses a cane for ambulation, discussed with patient and family about home modifications to make to reduce her risk of falling.  Since her UTI patient has had a decline in cognition requiring more cueing for reminders in taking her medication and using her AD.  Patient at this time is not safe to be left alone due to impaired mobiity and requiring assistance for transferring requiring more assistance in the home to she will need to go to a rehab facility for care.    Plan For Next Visit:  - gait training  - HEP"

## 2024-04-11 ENCOUNTER — HOSPITAL ENCOUNTER (OUTPATIENT)
Dept: NUCLEAR MEDICINE | Facility: HOSPITAL | Age: 89
Discharge: HOME | End: 2024-04-11
Payer: MEDICARE

## 2024-04-11 ENCOUNTER — HOME CARE VISIT (OUTPATIENT)
Dept: HOME HEALTH SERVICES | Facility: HOME HEALTHCARE | Age: 89
End: 2024-04-11
Payer: MEDICARE

## 2024-04-11 DIAGNOSIS — M54.6 PAIN IN THORACIC SPINE: ICD-10-CM

## 2024-04-11 DIAGNOSIS — M80.88XA OSTEOPOROTIC COMPRESSION FRACTURE OF VERTEBRA, INITIAL ENCOUNTER: ICD-10-CM

## 2024-04-11 PROCEDURE — 78306 BONE IMAGING WHOLE BODY: CPT

## 2024-04-11 PROCEDURE — 0 TECHNETIUM MEDRONATE KIT: Performed by: NURSE PRACTITIONER

## 2024-04-11 PROCEDURE — A9503 TC99M MEDRONATE: HCPCS | Performed by: NURSE PRACTITIONER

## 2024-04-11 RX ORDER — TC 99M MEDRONATE 20 MG/10ML
20.4 INJECTION, POWDER, LYOPHILIZED, FOR SOLUTION INTRAVENOUS
Status: COMPLETED | OUTPATIENT
Start: 2024-04-11 | End: 2024-04-11

## 2024-04-11 RX ADMIN — Medication 20.4 MILLICURIE: at 06:43

## 2024-04-11 NOTE — HOME HEALTH
MSW eval on this date with patient and children present. Currently patient is being considered for inpatient rehab stay at [facility]. Communication with admission director Glenda and paperwork has been sent over, SHANDRA Velasco completed most recent PT visit prior to MSW and note will be sent over for further review from insurance company. Provided additional options for care in home and long term options for 24 hour care if needed prior after change in current plan of care. Patient's family involved and staying with patient during recovery and rehabilitation. Patient has all other needs met and family agreeable to contact MSW with additional needs or concerns.

## 2024-04-12 ENCOUNTER — OFFICE VISIT (OUTPATIENT)
Dept: NEUROSURGERY | Facility: CLINIC | Age: 89
End: 2024-04-12
Payer: MEDICARE

## 2024-04-12 ENCOUNTER — HOME CARE VISIT (OUTPATIENT)
Dept: HOME HEALTH SERVICES | Facility: HOME HEALTHCARE | Age: 89
End: 2024-04-12
Payer: MEDICARE

## 2024-04-12 VITALS
WEIGHT: 126 LBS | HEIGHT: 64 IN | DIASTOLIC BLOOD PRESSURE: 62 MMHG | SYSTOLIC BLOOD PRESSURE: 100 MMHG | BODY MASS INDEX: 21.51 KG/M2 | RESPIRATION RATE: 20 BRPM

## 2024-04-12 DIAGNOSIS — M80.08XG CRUSH FRACTURE OF VERTEBRA DUE TO OSTEOPOROSIS WITH DELAYED HEALING, SUBSEQUENT ENCOUNTER: Primary | ICD-10-CM

## 2024-04-12 DIAGNOSIS — M54.41 CHRONIC BILATERAL LOW BACK PAIN WITH BILATERAL SCIATICA: ICD-10-CM

## 2024-04-12 DIAGNOSIS — M54.42 CHRONIC BILATERAL LOW BACK PAIN WITH BILATERAL SCIATICA: ICD-10-CM

## 2024-04-12 DIAGNOSIS — M54.6 ACUTE BILATERAL THORACIC BACK PAIN: ICD-10-CM

## 2024-04-12 DIAGNOSIS — M51.36 DDD (DEGENERATIVE DISC DISEASE), LUMBAR: ICD-10-CM

## 2024-04-12 DIAGNOSIS — G89.29 CHRONIC BILATERAL LOW BACK PAIN WITH BILATERAL SCIATICA: ICD-10-CM

## 2024-04-12 PROBLEM — M80.08XA CRUSH FRACTURE OF VERTEBRA DUE TO OSTEOPOROSIS: Status: ACTIVE | Noted: 2024-04-12

## 2024-04-12 PROCEDURE — 99214 OFFICE O/P EST MOD 30 MIN: CPT | Performed by: NEUROLOGICAL SURGERY

## 2024-04-12 PROCEDURE — 1159F MED LIST DOCD IN RCRD: CPT | Performed by: NEUROLOGICAL SURGERY

## 2024-04-12 PROCEDURE — 1160F RVW MEDS BY RX/DR IN RCRD: CPT | Performed by: NEUROLOGICAL SURGERY

## 2024-04-12 NOTE — PROGRESS NOTES
Just wanted to make sure you saw this report. I believe the patient is seeing you next week sometime to discuss kyphoplasty.

## 2024-04-12 NOTE — LETTER
April 12, 2024       No Recipients    Patient: Barbie Hernandez   YOB: 1932   Date of Visit: 4/12/2024     Dear Tika Perry MD:       Thank you for referring Barbie Hernandez to me for evaluation. Below are the relevant portions of my assessment and plan of care.    If you have questions, please do not hesitate to call me. I look forward to following Barbie along with you.         Sincerely,        Isiah Jo MD        CC:   No Recipients    Isiah Jo MD  04/12/24 1455  Sign when Signing Visit  Subjective  Patient ID: Barbie Hernandez is a 89 y.o. female is here today for follow-up after bone scan    This very nice lady is with her daughter.  Her other daughter who was on the cell phone.  She has not pacemaker that is not MRI compatible.  She has a history of osteoporosis and her GYN doctor, Lucia Pascual, has her on Reclast.  She fell about 12 days ago and began having pain shortly after in the thoracic region that initially radiated to the right side but since discharge radiated to the left.  She does not have much pain if she lies down or sits down but it is quite severe when she is standing.  She was put in a Climax brace when she was seen by our service.  Her daughter wanted my opinion about a kyphoplasty.  She is not on any blood thinners.  She has been followed by Dr. Gloria a cardiologist downtown and she has the pacemaker.  She is not on any blood thinners.  She does not have any neurological deficits and she is tender to palpation in the thoracic region as expected.  The brace is quite uncomfortable in the timeframe for her getting better is probably in the 3 to 6-month timeframe which is quite a bit considering the pain that she is in so I think a kyphoplasty at T9 is very reasonable.  We would need to get cardiac clearance.  She would not need to use the brace afterwards.  The chances are reasonable in the 80 to 90% range that this will be helpful but I again stressed  "that this is not a treatment for osteoporosis and that she would need to continue taking her medications and be monitored by Dr. Pascual.  I also told her there is a risk of additional fractures in the future on the basis of her osteoporosis that could be treated this way if necessary.        History of Present Illness    The following portions of the patient's history were reviewed and updated as appropriate: allergies, current medications, past family history, past medical history, past social history, past surgical history, and problem list.    Review of Systems   Constitutional:  Negative for fever.   Musculoskeletal:  Positive for gait problem. Negative for back pain.   Neurological:  Positive for weakness (right arm). Negative for numbness.   All other systems reviewed and are negative.          Objective    Vitals:    04/12/24 1348   BP: 100/62   BP Location: Right arm   Patient Position: Sitting   Cuff Size: Adult   Resp: 20   Weight: 57.2 kg (126 lb)   Height: 162.6 cm (64.02\")   PainSc: 0-No pain     Body mass index is 21.62 kg/m².    Tobacco Use: Low Risk  (4/12/2024)    Patient History    • Smoking Tobacco Use: Never    • Smokeless Tobacco Use: Never    • Passive Exposure: Not on file          Physical Exam  Constitutional:       Appearance: She is well-developed.   HENT:      Head: Normocephalic and atraumatic.   Eyes:      Extraocular Movements: EOM normal.      Conjunctiva/sclera: Conjunctivae normal.      Pupils: Pupils are equal, round, and reactive to light.   Neck:      Vascular: No carotid bruit.   Neurological:      Mental Status: She is oriented to person, place, and time.      Coordination: Finger-Nose-Finger Test and Heel to Shin Test normal.      Gait: Gait is intact.      Deep Tendon Reflexes:      Reflex Scores:       Tricep reflexes are 2+ on the right side and 2+ on the left side.       Bicep reflexes are 2+ on the right side and 2+ on the left side.       Brachioradialis reflexes are 2+ " on the right side and 2+ on the left side.       Patellar reflexes are 2+ on the right side and 2+ on the left side.       Achilles reflexes are 2+ on the right side and 2+ on the left side.  Psychiatric:         Speech: Speech normal.       Neurologic Exam     Mental Status   Oriented to person, place, and time.   Registration of memory: Good recent and remote memory.   Attention: normal. Concentration: normal.   Speech: speech is normal   Level of consciousness: alert  Knowledge: consistent with education.     Cranial Nerves     CN II   Visual fields full to confrontation.   Visual acuity: normal    CN III, IV, VI   Pupils are equal, round, and reactive to light.  Extraocular motions are normal.     CN V   Facial sensation intact.   Right corneal reflex: normal  Left corneal reflex: normal    CN VII   Facial expression full, symmetric.   Right facial weakness: none  Left facial weakness: none    CN VIII   Hearing: intact    CN IX, X   Palate: symmetric    CN XI   Right sternocleidomastoid strength: normal  Left sternocleidomastoid strength: normal    CN XII   Tongue: not atrophic  Tongue deviation: none    Motor Exam   Muscle bulk: normal  Right arm tone: normal  Left arm tone: normal  Right leg tone: normal  Left leg tone: normal    Strength   Strength 5/5 except as noted.     Sensory Exam   Light touch normal.     Gait, Coordination, and Reflexes     Gait  Gait: normal    Coordination   Finger to nose coordination: normal  Heel to shin coordination: normal    Reflexes   Right brachioradialis: 2+  Left brachioradialis: 2+  Right biceps: 2+  Left biceps: 2+  Right triceps: 2+  Left triceps: 2+  Right patellar: 2+  Left patellar: 2+  Right achilles: 2+  Left achilles: 2+  Right : 2+  Left : 2+          Assessment & Plan  Independent Review of Radiographic Studies:      I personally reviewed the images from the following studies.    The CT scan of the thoracic spine done on 4/4/2024 shows a probable acute T9  compression fracture with about 25% collapse and no retropulsion.  The bone scan done yesterday on 4/11/2024 show acute uptake at T9 indicating the acuity of the fracture.  Agree with the report.        Medical Decision Making:      Will need to get insurance approval and cardiac clearance from Dr. Gloria.  I described and recommended a T9 kyphoplasty.  The goal of surgery is stabilization of the fracture to decrease pain and improve the quality of life.  The patient knows that the surgery will not prevent another fracture and that he or she may need additional surgery in the future.  The risks include, but are not limited to, infection, hemorrhage requiring transfusion or reoperation, extravasation of cement causing spinal cord injury, pulmonary embolus, incomplete relief of symptoms, potential need for additional surgeries in the future, stroke, paralysis, coma, and death.  The patient agrees to proceed.     She is not on any blood thinners.  I would keep her overnight and have PT mobilizer that day.  I asked her to use the brace until the day of the surgery.    Diagnoses and all orders for this visit:    1. Crush fracture of vertebra due to osteoporosis with delayed healing, subsequent encounter (Primary)  -     Case Request; Standing  -     ceFAZolin (ANCEF) 2 g in sodium chloride 0.9 % 100 mL IVPB  -     Case Request    2. Acute bilateral thoracic back pain  -     Case Request; Standing  -     ceFAZolin (ANCEF) 2 g in sodium chloride 0.9 % 100 mL IVPB  -     Case Request    3. Chronic bilateral low back pain with bilateral sciatica    4. DDD (degenerative disc disease), lumbar    Other orders  -     Outpatient In A Bed; Standing  -     Follow Anesthesia Guidelines / Protocol; Future  -     Follow Anesthesia Guidelines / Protocol; Standing  -     Verify / Perform Chlorhexidine Skin Prep; Standing  -     Provide Patient With Instructions on NPO Status; Future  -     Provide Chlorhexidine Skin Prep Wipes and  Instructions; Future      Return for 7 to 10 days after surgery with an APC.

## 2024-04-17 ENCOUNTER — TELEPHONE (OUTPATIENT)
Dept: NEUROSURGERY | Facility: CLINIC | Age: 89
End: 2024-04-17
Payer: MEDICARE

## 2024-04-17 NOTE — TELEPHONE ENCOUNTER
Patient says that she received a call from her Insurance company regarding her surgery and she was confused after speaking with them so she wants to confirm with us that the insurance is taken care of for her upcoming surgery on 04/26 and nothing else is needed in order to proceed with being able to have surgery, please call and advise thank you!

## 2024-04-18 NOTE — TELEPHONE ENCOUNTER
Patient's daughter called (Marisol) and she was looking to make her mother was cleared from Cardiology for her surgery with Dr. Jo.  I did not see anything in the chart, and daughter would like a call back whenever you hear back from Cardiology.

## 2024-04-19 NOTE — TELEPHONE ENCOUNTER
Called and spoke with daughter about the cardio clearance.  I told her I will call her when I received it or is I did not rec'd it today.

## 2024-04-22 ENCOUNTER — PRE-ADMISSION TESTING (OUTPATIENT)
Dept: PREADMISSION TESTING | Facility: HOSPITAL | Age: 89
End: 2024-04-22
Payer: MEDICARE

## 2024-04-22 VITALS
TEMPERATURE: 99.1 F | DIASTOLIC BLOOD PRESSURE: 67 MMHG | OXYGEN SATURATION: 95 % | BODY MASS INDEX: 25.4 KG/M2 | SYSTOLIC BLOOD PRESSURE: 110 MMHG | HEIGHT: 62 IN | HEART RATE: 92 BPM | WEIGHT: 138 LBS | RESPIRATION RATE: 18 BRPM

## 2024-04-22 LAB
ANION GAP SERPL CALCULATED.3IONS-SCNC: 10 MMOL/L (ref 5–15)
BUN SERPL-MCNC: 17 MG/DL (ref 8–23)
BUN/CREAT SERPL: 18.9 (ref 7–25)
CALCIUM SPEC-SCNC: 8.6 MG/DL (ref 8.2–9.6)
CHLORIDE SERPL-SCNC: 108 MMOL/L (ref 98–107)
CO2 SERPL-SCNC: 23 MMOL/L (ref 22–29)
CREAT SERPL-MCNC: 0.9 MG/DL (ref 0.57–1)
DEPRECATED RDW RBC AUTO: 45 FL (ref 37–54)
EGFRCR SERPLBLD CKD-EPI 2021: 60.5 ML/MIN/1.73
ERYTHROCYTE [DISTWIDTH] IN BLOOD BY AUTOMATED COUNT: 13.3 % (ref 12.3–15.4)
GLUCOSE SERPL-MCNC: 125 MG/DL (ref 65–99)
HCT VFR BLD AUTO: 36.7 % (ref 34–46.6)
HGB BLD-MCNC: 11.9 G/DL (ref 12–15.9)
MCH RBC QN AUTO: 29.5 PG (ref 26.6–33)
MCHC RBC AUTO-ENTMCNC: 32.4 G/DL (ref 31.5–35.7)
MCV RBC AUTO: 90.8 FL (ref 79–97)
PLATELET # BLD AUTO: 201 10*3/MM3 (ref 140–450)
PMV BLD AUTO: 9.9 FL (ref 6–12)
POTASSIUM SERPL-SCNC: 4.4 MMOL/L (ref 3.5–5.2)
RBC # BLD AUTO: 4.04 10*6/MM3 (ref 3.77–5.28)
SODIUM SERPL-SCNC: 141 MMOL/L (ref 136–145)
WBC NRBC COR # BLD AUTO: 5.6 10*3/MM3 (ref 3.4–10.8)

## 2024-04-22 PROCEDURE — 85027 COMPLETE CBC AUTOMATED: CPT

## 2024-04-22 PROCEDURE — 36415 COLL VENOUS BLD VENIPUNCTURE: CPT

## 2024-04-22 PROCEDURE — 80048 BASIC METABOLIC PNL TOTAL CA: CPT

## 2024-04-22 RX ORDER — SENNOSIDES 8.6 MG
650 CAPSULE ORAL EVERY 8 HOURS PRN
COMMUNITY

## 2024-04-22 RX ORDER — SENNOSIDES A AND B 8.6 MG/1
1 TABLET, FILM COATED ORAL 2 TIMES DAILY PRN
COMMUNITY
End: 2024-04-27 | Stop reason: HOSPADM

## 2024-04-22 RX ORDER — ERGOCALCIFEROL 1.25 MG/1
50000 CAPSULE ORAL WEEKLY
COMMUNITY

## 2024-04-22 RX ORDER — ACETAMINOPHEN 500 MG
500 TABLET ORAL EVERY 4 HOURS PRN
COMMUNITY

## 2024-04-22 RX ORDER — SENNOSIDES A AND B 8.6 MG/1
1 TABLET, FILM COATED ORAL DAILY
Status: ON HOLD | COMMUNITY
End: 2024-04-27

## 2024-04-22 RX ORDER — BISACODYL 10 MG
10 SUPPOSITORY, RECTAL RECTAL DAILY PRN
COMMUNITY

## 2024-04-22 RX ORDER — FERROUS SULFATE 325(65) MG
325 TABLET ORAL
COMMUNITY

## 2024-04-22 NOTE — DISCHARGE INSTRUCTIONS
Take the following medications the morning of surgery:  LEVOTHYROXINE, CARVEDILOL, GABAPENTIN    HOLD VALSARTAN NIGHT BEFORE SURGERY.    If you are on prescription narcotic pain medication to control your pain you may also take that medication the morning of surgery.    General Instructions:  Do not eat solid food after midnight the night before surgery.  You may drink clear liquids day of surgery but must stop at least one hour before your hospital arrival time.  It is beneficial for you to have a clear drink that contains carbohydrates the day of surgery.  We suggest a 12 to 20 ounce bottle of Gatorade or Powerade for non-diabetic patients or a 12 to 20 ounce bottle of G2 or Powerade Zero for diabetic patients. (Pediatric patients, are not advised to drink a 12 to 20 ounce carbohydrate drink)    Clear liquids are liquids you can see through.  Nothing red in color.     Plain water                               Sports drinks  Sodas                                   Gelatin (Jell-O)  Fruit juices without pulp such as white grape juice and apple juice  Popsicles that contain no fruit or yogurt  Tea or coffee (no cream or milk added)  Gatorade / Powerade  G2 / Powerade Zero    Infants may have breast milk up to four hours before surgery.  Infants drinking formula may drink formula up to six hours before surgery.   Patients who avoid smoking, chewing tobacco and alcohol for 4 weeks prior to surgery have a reduced risk of post-operative complications.  Quit smoking as many days before surgery as you can.  Do not smoke, use chewing tobacco or drink alcohol the day of surgery.   If applicable bring your C-PAP/ BI-PAP machine in with you to preop day of surgery.  Bring any papers given to you in the doctor’s office.  Wear clean comfortable clothes.  Do not wear contact lenses, false eyelashes or make-up.  Bring a case for your glasses.   Bring crutches or walker if applicable.  Remove all piercings.  Leave jewelry and any  other valuables at home.  Hair extensions with metal clips must be removed prior to surgery.  The Pre-Admission Testing nurse will instruct you to bring medications if unable to obtain an accurate list in Pre-Admission Testing.        If you were given a blood bank ID arm band remember to bring it with you the day of surgery.    Preventing a Surgical Site Infection:  For 2 to 3 days before surgery, avoid shaving with a razor because the razor can irritate skin and make it easier to develop an infection.    Any areas of open skin can increase the risk of a post-operative wound infection by allowing bacteria to enter and travel throughout the body.  Notify your surgeon if you have any skin wounds / rashes even if it is not near the expected surgical site.  The area will need assessed to determine if surgery should be delayed until it is healed.  The night prior to surgery shower using a fresh bar of anti-bacterial soap (such as Dial) and clean washcloth.  Sleep in a clean bed with clean clothing.  Do not allow pets to sleep with you.  Shower on the morning of surgery using a fresh bar of anti-bacterial soap (such as Dial) and clean washcloth.  Dry with a clean towel and dress in clean clothing.  Ask your surgeon if you will be receiving antibiotics prior to surgery.  Make sure you, your family, and all healthcare providers clean their hands with soap and water or an alcohol based hand  before caring for you or your wound.    Day of surgery:  Your arrival time is approximately two hours before your scheduled surgery time.  Upon arrival, a Pre-op nurse and Anesthesiologist will review your health history, obtain vital signs, and answer questions you may have.  The only belongings needed at this time will be a list of your home medications and if applicable your C-PAP/BI-PAP machine.  A Pre-op nurse will start an IV and you may receive medication in preparation for surgery, including something to help you relax.      Please be aware that surgery does come with discomfort.  We want to make every effort to control your discomfort so please discuss any uncontrolled symptoms with your nurse.   Your doctor will most likely have prescribed pain medications.      If you are going home after surgery you will receive individualized written care instructions before being discharged.  A responsible adult must drive you to and from the hospital on the day of your surgery and ideally stay with you through the night.   .  Discharge prescriptions can be filled by the hospital pharmacy during regular pharmacy hours.  If you are having surgery late in the day/evening your prescription may be e-prescribed to your pharmacy.  Please verify your pharmacy hours or chose a 24 hour pharmacy to avoid not having access to your prescription because your pharmacy has closed for the day.    If you are staying overnight following surgery, you will be transported to your hospital room following the recovery period.  [facility] has all private rooms.    If you have any questions please call Pre-Admission Testing at [phone].  Deductibles and co-payments are collected on the day of service. Please be prepared to pay the required co-pay, deductible or deposit on the day of service as defined by your plan.    Call your surgeon immediately if you experience any of the following symptoms:  Sore Throat  Shortness of Breath or difficulty breathing  Cough  Chills  Body soreness or muscle pain  Headache  Fever  New loss of taste or smell  Do not arrive for your surgery ill.  Your procedure will need to be rescheduled to another time.  You will need to call your physician before the day of surgery to avoid any unnecessary exposure to hospital staff as well as other patients.    CHLORHEXIDINE CLOTH INSTRUCTIONS  The morning of surgery follow these instructions using the Chlorhexidine cloths you've been given.  These steps reduce bacteria on the  body.  Do not use the cloths near your eyes, ears mouth, genitalia or on open wounds.  Throw the cloths away after use but do not try to flush them down a toilet.      Open and remove one cloth at a time from the package.    Leave the cloth unfolded and begin the bathing.  Massage the skin with the cloths using gentle pressure to remove bacteria.  Do not scrub harshly.   Follow the steps below with one 2% CHG cloth per area (6 total cloths).  One cloth for neck, shoulders and chest.  One cloth for both arms, hands, fingers and underarms (do underarms last).  One cloth for the abdomen followed by groin.  One cloth for right leg and foot including between the toes.  One cloth for left leg and foot including between the toes.  The last cloth is to be used for the back of the neck, back and buttocks.    Allow the CHG to air dry 3 minutes on the skin which will give it time to work and decrease the chance of irritation.  The skin may feel sticky until it is dry.  Do not rinse with water or any other liquid or you will lose the beneficial effects of the CHG.  If mild skin irritation occurs, do rinse the skin to remove the CHG.  Report this to the nurse at time of admission.  Do not apply lotions, creams, ointments, deodorants or perfumes after using the clothes. Dress in clean clothes before coming to the hospital.

## 2024-04-26 ENCOUNTER — HOME CARE VISIT (OUTPATIENT)
Dept: HOME HEALTH SERVICES | Facility: HOME HEALTHCARE | Age: 89
End: 2024-04-26
Payer: MEDICARE

## 2024-04-26 ENCOUNTER — ANESTHESIA (OUTPATIENT)
Dept: PERIOP | Facility: HOSPITAL | Age: 89
End: 2024-04-26
Payer: MEDICARE

## 2024-04-26 ENCOUNTER — ANESTHESIA EVENT (OUTPATIENT)
Dept: PERIOP | Facility: HOSPITAL | Age: 89
End: 2024-04-26
Payer: MEDICARE

## 2024-04-26 ENCOUNTER — HOSPITAL ENCOUNTER (OUTPATIENT)
Facility: HOSPITAL | Age: 89
Discharge: HOME | End: 2024-04-27
Attending: NEUROLOGICAL SURGERY | Admitting: NEUROLOGICAL SURGERY
Payer: MEDICARE

## 2024-04-26 ENCOUNTER — DOCUMENTATION (OUTPATIENT)
Dept: HOME HEALTH SERVICES | Facility: HOME HEALTHCARE | Age: 89
End: 2024-04-26
Payer: MEDICARE

## 2024-04-26 ENCOUNTER — APPOINTMENT (OUTPATIENT)
Dept: GENERAL RADIOLOGY | Facility: HOSPITAL | Age: 89
End: 2024-04-26
Payer: MEDICARE

## 2024-04-26 DIAGNOSIS — M80.08XG CRUSH FRACTURE OF VERTEBRA DUE TO OSTEOPOROSIS WITH DELAYED HEALING, SUBSEQUENT ENCOUNTER: ICD-10-CM

## 2024-04-26 DIAGNOSIS — M54.6 ACUTE BILATERAL THORACIC BACK PAIN: ICD-10-CM

## 2024-04-26 PROCEDURE — 25010000002 CEFAZOLIN PER 500 MG: Performed by: NEUROLOGICAL SURGERY

## 2024-04-26 PROCEDURE — 25010000002 PROPOFOL 10 MG/ML EMULSION: Performed by: NURSE ANESTHETIST, CERTIFIED REGISTERED

## 2024-04-26 PROCEDURE — A9270 NON-COVERED ITEM OR SERVICE: HCPCS | Performed by: NEUROLOGICAL SURGERY

## 2024-04-26 PROCEDURE — 63710000001 DOCUSATE SODIUM 100 MG CAPSULE: Performed by: NEUROLOGICAL SURGERY

## 2024-04-26 PROCEDURE — 63710000001 TRAMADOL 50 MG TABLET: Performed by: NEUROLOGICAL SURGERY

## 2024-04-26 PROCEDURE — 63710000001 VALSARTAN 40 MG TABLET: Performed by: NEUROLOGICAL SURGERY

## 2024-04-26 PROCEDURE — 63710000001 ACETAMINOPHEN EXTRA STRENGTH 500 MG TABLET: Performed by: ANESTHESIOLOGY

## 2024-04-26 PROCEDURE — 25510000001 IOPAMIDOL 41 % SOLUTION: Performed by: NEUROLOGICAL SURGERY

## 2024-04-26 PROCEDURE — A9270 NON-COVERED ITEM OR SERVICE: HCPCS | Performed by: ANESTHESIOLOGY

## 2024-04-26 PROCEDURE — 88342 IMHCHEM/IMCYTCHM 1ST ANTB: CPT | Performed by: NEUROLOGICAL SURGERY

## 2024-04-26 PROCEDURE — 25810000003 LACTATED RINGERS PER 1000 ML: Performed by: ANESTHESIOLOGY

## 2024-04-26 PROCEDURE — 25010000002 SUCCINYLCHOLINE PER 20 MG: Performed by: NURSE ANESTHETIST, CERTIFIED REGISTERED

## 2024-04-26 PROCEDURE — 22513 PERQ VERTEBRAL AUGMENTATION: CPT | Performed by: NEUROLOGICAL SURGERY

## 2024-04-26 PROCEDURE — 63710000001 GABAPENTIN 100 MG CAPSULE: Performed by: NEUROLOGICAL SURGERY

## 2024-04-26 PROCEDURE — 25810000003 LACTATED RINGERS PER 1000 ML: Performed by: NEUROLOGICAL SURGERY

## 2024-04-26 PROCEDURE — C1713 ANCHOR/SCREW BN/BN,TIS/BN: HCPCS | Performed by: NEUROLOGICAL SURGERY

## 2024-04-26 PROCEDURE — 25010000002 PHENYLEPHRINE 10 MG/ML SOLUTION: Performed by: NURSE ANESTHETIST, CERTIFIED REGISTERED

## 2024-04-26 PROCEDURE — 88307 TISSUE EXAM BY PATHOLOGIST: CPT | Performed by: NEUROLOGICAL SURGERY

## 2024-04-26 DEVICE — CEMENT C01A KYPHX HV-R BONE CEMENT EN
Type: IMPLANTABLE DEVICE | Site: SPINE LUMBAR | Status: FUNCTIONAL
Brand: KYPHON® HV-R® BONE CEMENT

## 2024-04-26 RX ORDER — SODIUM CHLORIDE 0.9 % (FLUSH) 0.9 %
10 SYRINGE (ML) INJECTION AS NEEDED
Status: DISCONTINUED | OUTPATIENT
Start: 2024-04-26 | End: 2024-04-27 | Stop reason: HOSPADM

## 2024-04-26 RX ORDER — ONDANSETRON 2 MG/ML
4 INJECTION INTRAMUSCULAR; INTRAVENOUS EVERY 6 HOURS PRN
Status: DISCONTINUED | OUTPATIENT
Start: 2024-04-26 | End: 2024-04-27 | Stop reason: HOSPADM

## 2024-04-26 RX ORDER — ONDANSETRON 2 MG/ML
4 INJECTION INTRAMUSCULAR; INTRAVENOUS ONCE AS NEEDED
Status: DISCONTINUED | OUTPATIENT
Start: 2024-04-26 | End: 2024-04-26 | Stop reason: HOSPADM

## 2024-04-26 RX ORDER — ACETAMINOPHEN 325 MG/1
650 TABLET ORAL EVERY 4 HOURS PRN
Status: DISCONTINUED | OUTPATIENT
Start: 2024-04-26 | End: 2024-04-27 | Stop reason: HOSPADM

## 2024-04-26 RX ORDER — VALSARTAN 40 MG/1
40 TABLET ORAL NIGHTLY
Status: DISCONTINUED | OUTPATIENT
Start: 2024-04-26 | End: 2024-04-27 | Stop reason: HOSPADM

## 2024-04-26 RX ORDER — SODIUM CHLORIDE 0.9 % (FLUSH) 0.9 %
10 SYRINGE (ML) INJECTION EVERY 12 HOURS SCHEDULED
Status: DISCONTINUED | OUTPATIENT
Start: 2024-04-26 | End: 2024-04-27 | Stop reason: HOSPADM

## 2024-04-26 RX ORDER — NALOXONE HCL 0.4 MG/ML
0.4 VIAL (ML) INJECTION
Status: DISCONTINUED | OUTPATIENT
Start: 2024-04-26 | End: 2024-04-27 | Stop reason: HOSPADM

## 2024-04-26 RX ORDER — NALOXONE HCL 0.4 MG/ML
0.2 VIAL (ML) INJECTION AS NEEDED
Status: DISCONTINUED | OUTPATIENT
Start: 2024-04-26 | End: 2024-04-26 | Stop reason: HOSPADM

## 2024-04-26 RX ORDER — METHOCARBAMOL 750 MG/1
750 TABLET, FILM COATED ORAL 4 TIMES DAILY PRN
Status: DISCONTINUED | OUTPATIENT
Start: 2024-04-26 | End: 2024-04-27 | Stop reason: HOSPADM

## 2024-04-26 RX ORDER — LEVOTHYROXINE SODIUM 112 UG/1
112 TABLET ORAL DAILY
Status: DISCONTINUED | OUTPATIENT
Start: 2024-04-26 | End: 2024-04-27 | Stop reason: HOSPADM

## 2024-04-26 RX ORDER — SENNOSIDES A AND B 8.6 MG/1
1 TABLET, FILM COATED ORAL DAILY
Status: DISCONTINUED | OUTPATIENT
Start: 2024-04-26 | End: 2024-04-27 | Stop reason: HOSPADM

## 2024-04-26 RX ORDER — BUPIVACAINE HYDROCHLORIDE AND EPINEPHRINE 2.5; 5 MG/ML; UG/ML
INJECTION, SOLUTION EPIDURAL; INFILTRATION; INTRACAUDAL; PERINEURAL AS NEEDED
Status: DISCONTINUED | OUTPATIENT
Start: 2024-04-26 | End: 2024-04-26 | Stop reason: HOSPADM

## 2024-04-26 RX ORDER — SODIUM CHLORIDE, SODIUM LACTATE, POTASSIUM CHLORIDE, CALCIUM CHLORIDE 600; 310; 30; 20 MG/100ML; MG/100ML; MG/100ML; MG/100ML
9 INJECTION, SOLUTION INTRAVENOUS CONTINUOUS
Status: DISCONTINUED | OUTPATIENT
Start: 2024-04-26 | End: 2024-04-26

## 2024-04-26 RX ORDER — PROPOFOL 10 MG/ML
VIAL (ML) INTRAVENOUS AS NEEDED
Status: DISCONTINUED | OUTPATIENT
Start: 2024-04-26 | End: 2024-04-26 | Stop reason: SURG

## 2024-04-26 RX ORDER — TRAMADOL HYDROCHLORIDE 50 MG/1
50 TABLET ORAL EVERY 8 HOURS PRN
Status: DISCONTINUED | OUTPATIENT
Start: 2024-04-26 | End: 2024-04-27 | Stop reason: HOSPADM

## 2024-04-26 RX ORDER — FENTANYL CITRATE 50 UG/ML
50 INJECTION, SOLUTION INTRAMUSCULAR; INTRAVENOUS
Status: DISCONTINUED | OUTPATIENT
Start: 2024-04-26 | End: 2024-04-26 | Stop reason: HOSPADM

## 2024-04-26 RX ORDER — SODIUM CHLORIDE 0.9 % (FLUSH) 0.9 %
3-10 SYRINGE (ML) INJECTION AS NEEDED
Status: DISCONTINUED | OUTPATIENT
Start: 2024-04-26 | End: 2024-04-26 | Stop reason: HOSPADM

## 2024-04-26 RX ORDER — MORPHINE SULFATE 2 MG/ML
1 INJECTION, SOLUTION INTRAMUSCULAR; INTRAVENOUS EVERY 4 HOURS PRN
Status: DISCONTINUED | OUTPATIENT
Start: 2024-04-26 | End: 2024-04-27 | Stop reason: HOSPADM

## 2024-04-26 RX ORDER — HYDROCODONE BITARTRATE AND ACETAMINOPHEN 7.5; 325 MG/1; MG/1
1 TABLET ORAL EVERY 4 HOURS PRN
Status: DISCONTINUED | OUTPATIENT
Start: 2024-04-26 | End: 2024-04-26 | Stop reason: HOSPADM

## 2024-04-26 RX ORDER — HYDROCODONE BITARTRATE AND ACETAMINOPHEN 5; 325 MG/1; MG/1
1 TABLET ORAL ONCE AS NEEDED
Status: DISCONTINUED | OUTPATIENT
Start: 2024-04-26 | End: 2024-04-26 | Stop reason: HOSPADM

## 2024-04-26 RX ORDER — LIDOCAINE HYDROCHLORIDE 20 MG/ML
INJECTION, SOLUTION INFILTRATION; PERINEURAL AS NEEDED
Status: DISCONTINUED | OUTPATIENT
Start: 2024-04-26 | End: 2024-04-26 | Stop reason: SURG

## 2024-04-26 RX ORDER — SUCCINYLCHOLINE CHLORIDE 20 MG/ML
INJECTION INTRAMUSCULAR; INTRAVENOUS AS NEEDED
Status: DISCONTINUED | OUTPATIENT
Start: 2024-04-26 | End: 2024-04-26 | Stop reason: SURG

## 2024-04-26 RX ORDER — LATANOPROST 50 UG/ML
1 SOLUTION/ DROPS OPHTHALMIC NIGHTLY
Status: DISCONTINUED | OUTPATIENT
Start: 2024-04-26 | End: 2024-04-27 | Stop reason: HOSPADM

## 2024-04-26 RX ORDER — DROPERIDOL 2.5 MG/ML
0.62 INJECTION, SOLUTION INTRAMUSCULAR; INTRAVENOUS
Status: DISCONTINUED | OUTPATIENT
Start: 2024-04-26 | End: 2024-04-26 | Stop reason: HOSPADM

## 2024-04-26 RX ORDER — HYDRALAZINE HYDROCHLORIDE 20 MG/ML
5 INJECTION INTRAMUSCULAR; INTRAVENOUS
Status: DISCONTINUED | OUTPATIENT
Start: 2024-04-26 | End: 2024-04-26 | Stop reason: HOSPADM

## 2024-04-26 RX ORDER — GABAPENTIN 100 MG/1
100 CAPSULE ORAL 4 TIMES DAILY
Status: DISCONTINUED | OUTPATIENT
Start: 2024-04-26 | End: 2024-04-27 | Stop reason: HOSPADM

## 2024-04-26 RX ORDER — IPRATROPIUM BROMIDE AND ALBUTEROL SULFATE 2.5; .5 MG/3ML; MG/3ML
3 SOLUTION RESPIRATORY (INHALATION) ONCE AS NEEDED
Status: DISCONTINUED | OUTPATIENT
Start: 2024-04-26 | End: 2024-04-26 | Stop reason: HOSPADM

## 2024-04-26 RX ORDER — FLUMAZENIL 0.1 MG/ML
0.2 INJECTION INTRAVENOUS AS NEEDED
Status: DISCONTINUED | OUTPATIENT
Start: 2024-04-26 | End: 2024-04-26 | Stop reason: HOSPADM

## 2024-04-26 RX ORDER — CARVEDILOL 3.12 MG/1
3.12 TABLET ORAL 2 TIMES DAILY
Status: DISCONTINUED | OUTPATIENT
Start: 2024-04-26 | End: 2024-04-27 | Stop reason: HOSPADM

## 2024-04-26 RX ORDER — SODIUM CHLORIDE 0.9 % (FLUSH) 0.9 %
3 SYRINGE (ML) INJECTION EVERY 12 HOURS SCHEDULED
Status: DISCONTINUED | OUTPATIENT
Start: 2024-04-26 | End: 2024-04-26 | Stop reason: HOSPADM

## 2024-04-26 RX ORDER — DIPHENHYDRAMINE HYDROCHLORIDE 50 MG/ML
12.5 INJECTION INTRAMUSCULAR; INTRAVENOUS
Status: DISCONTINUED | OUTPATIENT
Start: 2024-04-26 | End: 2024-04-26 | Stop reason: HOSPADM

## 2024-04-26 RX ORDER — ONDANSETRON 4 MG/1
4 TABLET, ORALLY DISINTEGRATING ORAL EVERY 6 HOURS PRN
Status: DISCONTINUED | OUTPATIENT
Start: 2024-04-26 | End: 2024-04-27 | Stop reason: HOSPADM

## 2024-04-26 RX ORDER — FENTANYL CITRATE 50 UG/ML
25 INJECTION, SOLUTION INTRAMUSCULAR; INTRAVENOUS
Status: DISCONTINUED | OUTPATIENT
Start: 2024-04-26 | End: 2024-04-26 | Stop reason: HOSPADM

## 2024-04-26 RX ORDER — ACETAMINOPHEN 500 MG
1000 TABLET ORAL ONCE
Status: COMPLETED | OUTPATIENT
Start: 2024-04-26 | End: 2024-04-26

## 2024-04-26 RX ORDER — LABETALOL HYDROCHLORIDE 5 MG/ML
5 INJECTION, SOLUTION INTRAVENOUS
Status: DISCONTINUED | OUTPATIENT
Start: 2024-04-26 | End: 2024-04-26 | Stop reason: HOSPADM

## 2024-04-26 RX ORDER — SODIUM CHLORIDE, SODIUM LACTATE, POTASSIUM CHLORIDE, CALCIUM CHLORIDE 600; 310; 30; 20 MG/100ML; MG/100ML; MG/100ML; MG/100ML
75 INJECTION, SOLUTION INTRAVENOUS CONTINUOUS
Status: DISCONTINUED | OUTPATIENT
Start: 2024-04-26 | End: 2024-04-27 | Stop reason: HOSPADM

## 2024-04-26 RX ORDER — EPHEDRINE SULFATE 50 MG/ML
5 INJECTION, SOLUTION INTRAVENOUS ONCE AS NEEDED
Status: DISCONTINUED | OUTPATIENT
Start: 2024-04-26 | End: 2024-04-26 | Stop reason: HOSPADM

## 2024-04-26 RX ORDER — HYDROMORPHONE HYDROCHLORIDE 1 MG/ML
0.25 INJECTION, SOLUTION INTRAMUSCULAR; INTRAVENOUS; SUBCUTANEOUS
Status: DISCONTINUED | OUTPATIENT
Start: 2024-04-26 | End: 2024-04-26 | Stop reason: HOSPADM

## 2024-04-26 RX ORDER — PROMETHAZINE HYDROCHLORIDE 25 MG/1
25 SUPPOSITORY RECTAL ONCE AS NEEDED
Status: DISCONTINUED | OUTPATIENT
Start: 2024-04-26 | End: 2024-04-26 | Stop reason: HOSPADM

## 2024-04-26 RX ORDER — PHENYLEPHRINE HYDROCHLORIDE 10 MG/ML
INJECTION INTRAVENOUS AS NEEDED
Status: DISCONTINUED | OUTPATIENT
Start: 2024-04-26 | End: 2024-04-26 | Stop reason: SURG

## 2024-04-26 RX ORDER — FAMOTIDINE 10 MG/ML
20 INJECTION, SOLUTION INTRAVENOUS ONCE
Status: COMPLETED | OUTPATIENT
Start: 2024-04-26 | End: 2024-04-26

## 2024-04-26 RX ORDER — SENNOSIDES A AND B 8.6 MG/1
1 TABLET, FILM COATED ORAL 2 TIMES DAILY PRN
Status: DISCONTINUED | OUTPATIENT
Start: 2024-04-26 | End: 2024-04-27 | Stop reason: HOSPADM

## 2024-04-26 RX ORDER — IOPAMIDOL 408 MG/ML
INJECTION, SOLUTION INTRATHECAL AS NEEDED
Status: DISCONTINUED | OUTPATIENT
Start: 2024-04-26 | End: 2024-04-26 | Stop reason: HOSPADM

## 2024-04-26 RX ORDER — BISACODYL 10 MG
10 SUPPOSITORY, RECTAL RECTAL DAILY PRN
Status: DISCONTINUED | OUTPATIENT
Start: 2024-04-26 | End: 2024-04-27 | Stop reason: HOSPADM

## 2024-04-26 RX ORDER — PROMETHAZINE HYDROCHLORIDE 25 MG/1
25 TABLET ORAL ONCE AS NEEDED
Status: DISCONTINUED | OUTPATIENT
Start: 2024-04-26 | End: 2024-04-26 | Stop reason: HOSPADM

## 2024-04-26 RX ORDER — DOCUSATE SODIUM 100 MG/1
200 CAPSULE, LIQUID FILLED ORAL 2 TIMES DAILY
Status: DISCONTINUED | OUTPATIENT
Start: 2024-04-26 | End: 2024-04-27 | Stop reason: HOSPADM

## 2024-04-26 RX ORDER — SODIUM CHLORIDE 9 MG/ML
40 INJECTION, SOLUTION INTRAVENOUS AS NEEDED
Status: DISCONTINUED | OUTPATIENT
Start: 2024-04-26 | End: 2024-04-27 | Stop reason: HOSPADM

## 2024-04-26 RX ADMIN — GABAPENTIN 100 MG: 100 CAPSULE ORAL at 15:43

## 2024-04-26 RX ADMIN — PROPOFOL 80 MG: 10 INJECTION, EMULSION INTRAVENOUS at 10:45

## 2024-04-26 RX ADMIN — PHENYLEPHRINE HYDROCHLORIDE 15 MCG: 10 INJECTION INTRAVENOUS at 11:05

## 2024-04-26 RX ADMIN — SODIUM CHLORIDE 2 G: 900 INJECTION INTRAVENOUS at 10:31

## 2024-04-26 RX ADMIN — FAMOTIDINE 20 MG: 10 INJECTION INTRAVENOUS at 10:19

## 2024-04-26 RX ADMIN — LIDOCAINE HYDROCHLORIDE 50 MG: 20 INJECTION, SOLUTION INFILTRATION; PERINEURAL at 10:45

## 2024-04-26 RX ADMIN — PHENYLEPHRINE HYDROCHLORIDE 150 MCG: 10 INJECTION INTRAVENOUS at 11:02

## 2024-04-26 RX ADMIN — SUCCINYLCHOLINE CHLORIDE 100 MG: 20 INJECTION, SOLUTION INTRAMUSCULAR; INTRAVENOUS; PARENTERAL at 12:04

## 2024-04-26 RX ADMIN — PHENYLEPHRINE HYDROCHLORIDE 100 MCG: 10 INJECTION INTRAVENOUS at 11:20

## 2024-04-26 RX ADMIN — SODIUM CHLORIDE, POTASSIUM CHLORIDE, SODIUM LACTATE AND CALCIUM CHLORIDE 75 ML/HR: 600; 310; 30; 20 INJECTION, SOLUTION INTRAVENOUS at 15:42

## 2024-04-26 RX ADMIN — ACETAMINOPHEN 1000 MG: 500 TABLET ORAL at 10:19

## 2024-04-26 RX ADMIN — DOCUSATE SODIUM 200 MG: 100 CAPSULE, LIQUID FILLED ORAL at 20:34

## 2024-04-26 RX ADMIN — TRAMADOL HYDROCHLORIDE 50 MG: 50 TABLET ORAL at 22:49

## 2024-04-26 RX ADMIN — PHENYLEPHRINE HYDROCHLORIDE 150 MCG: 10 INJECTION INTRAVENOUS at 11:28

## 2024-04-26 RX ADMIN — PHENYLEPHRINE HYDROCHLORIDE 200 MCG: 10 INJECTION INTRAVENOUS at 11:32

## 2024-04-26 RX ADMIN — GABAPENTIN 100 MG: 100 CAPSULE ORAL at 20:34

## 2024-04-26 RX ADMIN — SODIUM CHLORIDE, POTASSIUM CHLORIDE, SODIUM LACTATE AND CALCIUM CHLORIDE 9 ML/HR: 600; 310; 30; 20 INJECTION, SOLUTION INTRAVENOUS at 10:19

## 2024-04-26 RX ADMIN — VALSARTAN 40 MG: 40 TABLET, COATED ORAL at 20:34

## 2024-04-26 RX ADMIN — Medication 10 ML: at 19:35

## 2024-04-26 NOTE — BRIEF OP NOTE
KYPHOPLASTY 1-2 LEVELS  Progress Note    Barbie Hernandez  4/26/2024    Pre-op Diagnosis:   Crush fracture of vertebra due to osteoporosis with delayed healing, subsequent encounter [M80.08XG]  Acute bilateral thoracic back pain [M54.6]       Post-Op Diagnosis Codes:     * Crush fracture of vertebra due to osteoporosis with delayed healing, subsequent encounter [M80.08XG]     * Acute bilateral thoracic back pain [M54.6]    Procedure/CPT® Codes:        Procedure(s):  Thoracic Nine kyphoplasty              Surgeon(s):  Isiah Jo MD    Anesthesia: General    Staff:   Circulator: Komal Sanderson RN  Scrub Person: Jordana Montejo  Vendor Representative: May Guevara  Vascular Radiology Technician: Ivan Wayne Kevin         Estimated Blood Loss: 10 cc    Urine Voided: * No values recorded between 4/26/2024 10:36 AM and 4/26/2024 12:08 PM *    Specimens:                Specimens       ID Source Type Tests Collected By Collected At Frozen?    A Spine, Thoracic Bone TISSUE PATHOLOGY EXAM   Isiah Jo MD 4/26/24 1148 No    Description: THORACIC 9 VERTEBRAL BIOPSY                  Drains:   [REMOVED] Urethral Catheter Silicone 16 Fr. (Removed)   Daily Indications Acute Urinary Retention 04/04/24 2155   Site Assessment Clean;Skin intact 04/04/24 2155   Collection Container Standard drainage bag 04/04/24 2155   Securement Method Securing device 04/04/24 2155   Catheter care complete Yes 04/04/24 2155   Output (mL) 300 mL 04/05/24 0533       Findings: osteoporotic bone        Complications: none          Isiah Jo MD     Date: 4/26/2024  Time: 15:20 EDT

## 2024-04-26 NOTE — ANESTHESIA PROCEDURE NOTES
Airway  Urgency: elective    Date/Time: 4/26/2024 10:48 AM  Difficult airway    General Information and Staff    Patient location during procedure: OR    Indications and Patient Condition  Indications for airway management: airway protection    Preoxygenated: yes  MILS maintained throughout  Mask difficulty assessment: 1 - vent by mask    Final Airway Details  Final airway type: endotracheal airway      Successful airway: ETT  Cuffed: yes   Successful intubation technique: direct laryngoscopy  Facilitating devices/methods: intubating stylet  Endotracheal tube insertion site: oral  Blade: Wilber  Blade size: 3  ETT size (mm): 7.0  Cormack-Lehane Classification: grade IIa - partial view of glottis  Placement verified by: chest auscultation and capnometry   Measured from: lips  ETT/EBT  to lips (cm): 2  Number of attempts at approach: 2  Assessment: lips, teeth, and gum same as pre-op and atraumatic intubation            
Back Pain

## 2024-04-26 NOTE — DISCHARGE PLACEMENT REQUEST
"Barbie Hernandez (89 y.o. Female)       Date of Birth   09/28/1932    Social Security Number       Address   [address] [place] [address]    Home Phone   [phone]    MRN   [id number]       [employer]   [employer]    Marital Status                               Admission Date   4/26/24    Admission Type   Elective    Admitting Provider   Isiah Jo MD    Attending Provider   Isiah Jo MD    Department, Room/Bed   [address], P596/1       Discharge Date       Discharge Disposition       Discharge Destination                                 Attending Provider: Isiah Jo MD    Allergies: Ciprofloxacin, Naproxen, Sulfa Antibiotics, Theophylline, Vancomycin    Isolation: None   Infection: None   Code Status: CPR    Ht: 157.5 cm (62\")   Wt: 62.6 kg (138 lb)    Admission Cmt: None   Principal Problem: Crush fracture of vertebra due to osteoporosis [M80.08XA]                   Active Insurance as of 4/26/2024       Primary Coverage       Payor Plan Insurance Group Employer/Plan Group    [facility] MEDICARE REPLACEMENT [facility] MED ADV GROUP [id number]       Payor Plan Address Payor Plan Phone Number Payor Plan Fax Number Effective Dates    [address] [address]   1/1/2023 - None Entered    [facility] [id number]         Subscriber Name Subscriber Birth Date Member ID       BARBIE HERNANDEZ 9/28/1932 [id number]                     Emergency Contacts        (Rel.) Home Phone Work Phone Mobile Phone    ANURAG MORGAN (Daughter) -- -- [phone]    Marisol Salazar (Daughter) [phone] -- --              "

## 2024-04-26 NOTE — ANESTHESIA POSTPROCEDURE EVALUATION
Patient: Barbie Hernandez    Procedure Summary       Date: 04/26/24 Room / Location: [facility] OR [address] [place] [address] HYBRID OR    Anesthesia Start: 1040 Anesthesia Stop: 1218    Procedure: Thoracic Nine kyphoplasty (Bilateral: Spine Lumbar) Diagnosis:       Crush fracture of vertebra due to osteoporosis with delayed healing, subsequent encounter      Acute bilateral thoracic back pain      (Crush fracture of vertebra due to osteoporosis with delayed healing, subsequent encounter [M80.08XG])      (Acute bilateral thoracic back pain [M54.6])    Surgeons: Isiah Jo MD Provider: Lico Payan MD    Anesthesia Type: general ASA Status: 3            Anesthesia Type: general    Vitals  Vitals Value Taken Time   /85 04/26/24 1315   Temp 36.4 °C (97.5 °F) 04/26/24 1210   Pulse 90 04/26/24 1316   Resp 16 04/26/24 1315   SpO2 97 % 04/26/24 1316   Vitals shown include unfiled device data.        Post Anesthesia Care and Evaluation    Patient location during evaluation: bedside  Patient participation: complete - patient participated  Level of consciousness: awake and alert  Pain management: adequate    Airway patency: patent  Anesthetic complications: No anesthetic complications  PONV Status: controlled  Cardiovascular status: acceptable and hemodynamically stable  Respiratory status: acceptable, spontaneous ventilation and nonlabored ventilation  Hydration status: acceptable    Comments: /81 (BP Location: Left arm, Patient Position: Lying)   Pulse 91   Temp 36.4 °C (97.5 °F) (Oral)   Resp 17   LMP  (LMP Unknown) Comment: menopause  SpO2 98%

## 2024-04-26 NOTE — ANESTHESIA PREPROCEDURE EVALUATION
Anesthesia Evaluation     no history of anesthetic complications:   NPO Solid Status: > 8 hours  NPO Liquid Status: > 2 hours           Airway   Mallampati: III  no difficulty expected  Dental - normal exam     Pulmonary - normal exam   (+) ,shortness of breath  (-) COPD, asthma, sleep apnea, not a smoker    ROS comment: PPD positive    PE comment: nonlabored  Cardiovascular - normal exam  Exercise tolerance: poor (<4 METS)    Rhythm: regular  Rate: normal    (+) pacemaker pacemaker, hypertension, CAD, dysrhythmias Paroxysmal Atrial Fib, CHF , hyperlipidemia  (-) valvular problems/murmurs, past MI, angina    ROS comment: Cardiomyopathy  Tachycardia-bradycardia      Neuro/Psych- negative ROS  (-) seizures, TIA, CVA  GI/Hepatic/Renal/Endo    (+) thyroid problem   (-) GERD, liver disease, no renal disease, diabetes    Musculoskeletal     (+) arthralgias, back pain, chronic pain      ROS comment: Crush fracture of vertebra due to osteoporosis    Abdominal    Substance History      OB/GYN          Other   arthritis,                 Anesthesia Plan    ASA 3     general     intravenous induction     Anesthetic plan, risks, benefits, and alternatives have been provided, discussed and informed consent has been obtained with: patient.    CODE STATUS:

## 2024-04-26 NOTE — OP NOTE
Preoperative diagnosis: Acute T9 osteoporotic compression fracture    Postoperative diagnosis: Same as above    Procedures performed: T9 kyphoplasty    Spinal Surgery Levels Completed:1 Level     Surgeon: Sandro    First Assistant: none    Anesthesia: GET    EBL: 10 cc    Complications: none    Specimen sent: T9 bone    Drains: none    Findings: osteoporotic bone    Postoperative condition: stable    Indications for the operation: The patient is a 89-year-old female with known osteoporosis with compression fracture at T9.  Bracing was tried but the brace was very uncomfortable and really did not help her pain.  She got cardiac clearance.  There was no retropulsion.  I felt she was a decent candidate for kyphoplasty and brought her to the operating room today for that purpose.    Informed consent: She understood that the goal of surgery is stabilization of the fracture to decrease pain and improve the quality of life.  The patient knows that the surgery will not prevent another fracture and that he or she may need additional surgery in the future.  The risks include, but are not limited to, infection, hemorrhage requiring transfusion or reoperation, extravasation of cement causing spinal cord injury, pulmonary embolus, incomplete relief of symptoms, potential need for additional surgeries in the future, stroke, paralysis, coma, and death.  The patient agrees to proceed.     Details of the operation: After cardiac clearance, the patient was taken to the operating room and remained in her gurney in the supine position.  After induction and endotracheal ovation, she got 2 g of Kefzol as per the SCIP protocol.  No Pena was needed.  SCDs were placed.  She was rolled over the prone position on a radiolucent x-ray table.  All pressure points were padded include the brachial plexus.  The thoracic region was then prepped and draped in the usual sterile fashion as was the biplanar C arms.  We did a surgical timeout.   Identified the T9 pedicle entry site on both sides after getting orthogonal views at T9 9 and confirming the level by counting up from the sacrum and from the top from T1.  Using a modified Jamshidi needle I cannulated first the right and the left T9 pedicle using a transpedicular technique.  The patient had wide enough pedicles to use this approach.  I got bone specimens after drive the needle 2 mm in the posterior cortex and then used a drill and then 10 mm balloons which were expanded to about 215 PSI with good expansion and mild elevation of the endplates.  I deflated balloons and proceeded put 3 cannulas worth of cement right and left without extravasation.  I was pleased with the pattern of cement.  The needles were removed and permanent records were obtained.  The staples were used to close the incisions and sterile clean dry dressings were placed.  The patient tolerated procedure well.  She was rolled over in supine position extubated and recovery in satisfactory condition.  She will be staying overnight.

## 2024-04-26 NOTE — PROGRESS NOTES
Patient is Current with [facility]. We will follow for discharge plans and HH needs.

## 2024-04-26 NOTE — PROGRESS NOTES
Discharge Planning Assessment  [facility]     Patient Name: Barbie Hernandez  MRN: [id number]  Today's Date: 4/26/2024    Admit Date: 4/26/2024    Plan: Home with continued [facility]   Discharge Needs Assessment       Row Name 04/26/24 1637       Living Environment    People in Home alone    Current Living Arrangements home    Primary Care Provided by self    Provides Primary Care For no one    Family Caregiver if Needed child(vito), adult    Family Caregiver Names Dtr Santana Mitchell [phone]    Quality of Family Relationships helpful;involved;supportive    Able to Return to Prior Arrangements yes       Transition Planning    Patient/Family Anticipates Transition to home with help/services    Patient/Family Anticipated Services at Transition home health care    Transportation Anticipated family or friend will provide       Discharge Needs Assessment    Equipment Currently Used at Home walker, rolling;cane, straight    Concerns to be Addressed discharge planning    Outpatient/Agency/Support Group Needs homecare agency    Discharge Facility/Level of Care Needs home with home health    Provided Post Acute Provider List? N/A    Discharge Coordination/Progress Current [facility]                   Discharge Plan       Row Name 04/26/24 1635       Plan    Plan Home with continued [facility]    Patient/Family in Agreement with Plan yes    Plan Comments CCP following to assist with d/c planning. Pt resides alone in a single level home, has cane and walker and recently discharged from [facility] with [facility] services still current (Marianne Nichols following). PT recommends HH at d/c. CCP to follow for additional needs. Eula Huitron LCSW                  Continued Care and Services - Admitted Since 4/26/2024       Home Medical Care       Service Provider Request Status Selected Services Address Phone Fax Patient Preferred    [facility] Pending - Request Sent N/A [address] [address] [phone] --                   Selected Continued Care - Prior Encounters Includes continued care and service providers with selected services from prior encounters from 1/27/2024 to 4/26/2024      Discharged on 4/5/2024 Admission date: 4/3/2024 - Discharge disposition: Home or Self Care      Home Medical Care       Service Provider Selected Services Address Phone Fax Patient Preferred     [facility] [address] [id number] [phone] --                          Expected Discharge Date and Time       Expected Discharge Date Expected Discharge Time    Apr 29, 2024            Demographic Summary       Row Name 04/26/24 1637       General Information    Admission Type other (see comments)    Arrived From home    Referral Source admission list    Reason for Consult discharge planning    Preferred Language English                   Functional Status       Row Name 04/26/24 1637       Functional Status    Usual Activity Tolerance good    Current Activity Tolerance good       Functional Status, IADL    Medications independent    Meal Preparation independent    Housekeeping independent    Laundry independent    Shopping independent       Mental Status Summary    Recent Changes in Mental Status/Cognitive Functioning no changes                   Psychosocial    No documentation.                  Abuse/Neglect    No documentation.                  Legal    No documentation.                  Substance Abuse    No documentation.                  Patient Forms    No documentation.                     Selena Huitron LCSW

## 2024-04-27 ENCOUNTER — DOCUMENTATION (OUTPATIENT)
Dept: HOME HEALTH SERVICES | Facility: HOME HEALTHCARE | Age: 89
End: 2024-04-27
Payer: MEDICARE

## 2024-04-27 VITALS
RESPIRATION RATE: 15 BRPM | BODY MASS INDEX: 25.4 KG/M2 | OXYGEN SATURATION: 94 % | WEIGHT: 138.01 LBS | TEMPERATURE: 98.4 F | DIASTOLIC BLOOD PRESSURE: 49 MMHG | HEART RATE: 90 BPM | SYSTOLIC BLOOD PRESSURE: 90 MMHG | HEIGHT: 62 IN

## 2024-04-27 PROCEDURE — A9270 NON-COVERED ITEM OR SERVICE: HCPCS | Performed by: NEUROLOGICAL SURGERY

## 2024-04-27 PROCEDURE — 63710000001 GABAPENTIN 100 MG CAPSULE: Performed by: NEUROLOGICAL SURGERY

## 2024-04-27 PROCEDURE — 99024 POSTOP FOLLOW-UP VISIT: CPT | Performed by: NURSE PRACTITIONER

## 2024-04-27 PROCEDURE — 63710000001 SENNA 8.6 MG TABLET: Performed by: NEUROLOGICAL SURGERY

## 2024-04-27 PROCEDURE — 63710000001 LEVOTHYROXINE 112 MCG TABLET: Performed by: NEUROLOGICAL SURGERY

## 2024-04-27 RX ORDER — VIBEGRON 75 MG/1
75 TABLET, FILM COATED ORAL DAILY
Qty: 30 TABLET | Refills: 0 | Status: SHIPPED | OUTPATIENT
Start: 2024-04-27 | End: 2024-05-27

## 2024-04-27 RX ORDER — VALSARTAN 40 MG/1
40 TABLET ORAL NIGHTLY
Qty: 30 TABLET | Refills: 0 | Status: SHIPPED | OUTPATIENT
Start: 2024-04-27

## 2024-04-27 RX ORDER — TRAMADOL HYDROCHLORIDE 50 MG/1
50 TABLET ORAL EVERY 8 HOURS PRN
Qty: 15 TABLET | Refills: 0 | Status: SHIPPED | OUTPATIENT
Start: 2024-04-27 | End: 2024-05-02

## 2024-04-27 RX ORDER — METHOCARBAMOL 750 MG/1
750 TABLET, FILM COATED ORAL 4 TIMES DAILY PRN
Qty: 20 TABLET | Refills: 0 | Status: SHIPPED | OUTPATIENT
Start: 2024-04-27 | End: 2024-05-02

## 2024-04-27 RX ORDER — SENNOSIDES A AND B 8.6 MG/1
1 TABLET, FILM COATED ORAL DAILY
Qty: 30 TABLET | Refills: 0 | Status: SHIPPED | OUTPATIENT
Start: 2024-04-27

## 2024-04-27 RX ADMIN — SENNOSIDES 1 TABLET: 8.6 TABLET, FILM COATED ORAL at 09:45

## 2024-04-27 RX ADMIN — LEVOTHYROXINE SODIUM 112 MCG: 112 TABLET ORAL at 09:45

## 2024-04-27 RX ADMIN — GABAPENTIN 100 MG: 100 CAPSULE ORAL at 09:45

## 2024-04-27 NOTE — PLAN OF CARE
Goal Outcome Evaluation:  Plan of Care Reviewed With: patient        Progress: improving  Outcome Evaluation: Ambulating well with cane and 1 assist. PO pain pill given x 1. Dressing with minimal drainage. Son at bedside

## 2024-04-27 NOTE — DISCHARGE SUMMARY
Barbie Hernandez  9/28/1932    Patient Care Team:  Tika Perry MD as PCP - General (Family Medicine)  Lucia Pascual MD as Referring Physician (Obstetrics and Gynecology)  Vinayak Gloria MD (Internal Medicine)    Date of Admit: 4/26/2024    Date of Discharge:  4/27/2024    Discharge Diagnosis:  Crush fracture of vertebra due to osteoporosis    Acute bilateral thoracic back pain      Procedures Performed  Procedure(s):  Thoracic Nine kyphoplasty       Complications: None     Consultants:   Consults       Date and Time Order Name Status Description    4/4/2024 12:42 PM Inpatient Neurosurgery Consult Completed             Condition on Discharge: stable    Discharge disposition: home    HPI: Barbie Hernandez is a 89 y.o. female who presented with a history of osteoporosis. She is prescribed Reclast. She has a history of atrial fibrillation and pace maker. She does not take any blood thinners.    She fell while attempting to get out of bed at the end of March. she presented to the ED on 4/4/24 because was having pain in the thoracic region. She was found to have a compression fracture of T9. She was given a William TLSO brace and was discharged home.     She was seen by Dr. Jo in the office on 4/12/24. She was wearing the brace but it was very uncomfortable for her. She was also continuing to have pain. Dr. Jo recommended a T9 Kyphoplasty.     Hospital Course: Patient admitted for above procedure. The patient was transferred to [facility]. She did very well post-op. Today, she reports that there has been much improvement with her pain level.     She has been ambulating with her cane. She does live at home but there will be family with her today. She will also receive .     She and her daughter feel comfortable with her discharging home.    Vitals:    04/27/24 0848   BP: 90/49   Pulse: 90   Resp: 15   Temp: 98.4 °F (36.9 °C)   SpO2: 94%         Lab Results (last 24 hours)       Procedure  Component Value Units Date/Time    Tissue Pathology Exam [[id number]] Collected: 04/26/24 1148    Specimen: Bone from Spine, Thoracic Updated: 04/26/24 1310            Imaging Results (Last 24 Hours)       Procedure Component Value Units Date/Time    Arteriogram (Autofinalize) [[id number]] Resulted: 04/26/24 1211     Updated: 04/26/24 1211    Narrative:      This procedure was auto-finalized with no dictation required.              Discharge Physical Exam:      General Appearance:    Alert, cooperative, in no acute distress   Head:    Normocephalic, without obvious abnormality, atraumatic   Back:     Thoracic dressing CDI   Abdomen:    non-tender, non-distended   Extremities/MSK:   Moves all extremities well, no edema, no cyanosis, no              Skin:   No bleeding, bruising or rash   Neurologic:   Cranial nerves 2 - 12 grossly intact, sensation intact,        Discharge Medications  Inspect has been reviewed and narcotic consent is on file in the patient's chart.     Your medication list        ASK your doctor about these medications        Instructions Last Dose Given Next Dose Due   acetaminophen 500 MG tablet  Commonly known as: TYLENOL      Take 1 tablet by mouth Every 4 (Four) Hours As Needed for Mild Pain.       acetaminophen 650 MG 8 hr tablet  Commonly known as: TYLENOL      Take 1 tablet by mouth Every 8 (Eight) Hours As Needed for Mild Pain.       bevacizumab 100 MG/4ML chemo injection  Commonly known as: AVASTIN      Administer 4 mL to both eyes See Admin Instructions. Every 6 to 7 weeks       bisacodyl 10 MG suppository  Commonly known as: DULCOLAX      Insert 1 suppository into the rectum Daily As Needed for Constipation.       CALCIUM PO      Take 1 tablet by mouth 2 (Two) Times a Day. HASN'T HAD SINCE FELL AND BEEN IN REHAB       carvedilol 3.125 MG tablet  Commonly known as: COREG      Take 1 tablet by mouth 2 (Two) Times a Day. Indications: High Blood Pressure Disorder       ferrous sulfate 325 (65  FE) MG tablet      Take 1 tablet by mouth Daily With Breakfast.       gabapentin 100 MG capsule  Commonly known as: NEURONTIN      Take 1 capsule by mouth 4 (Four) Times a Day.       GEMTESA PO      Take 75 mg by mouth Daily.       latanoprost 0.005 % ophthalmic solution  Commonly known as: XALATAN      Administer 1 drop to both eyes Every Night. Indications: Persistently Increased Pressure in the Eye       levothyroxine 112 MCG tablet  Commonly known as: SYNTHROID, LEVOTHROID      Take 1 tablet by mouth Daily.       RECLAST IV      Infuse  into a venous catheter. Yearly       senna 8.6 MG tablet  Commonly known as: SENOKOT      Take 1 tablet by mouth Daily.       senna 8.6 MG tablet  Commonly known as: SENOKOT      Take 1 tablet by mouth 2 (Two) Times a Day As Needed for Constipation.       valsartan 40 MG tablet  Commonly known as: DIOVAN      Take 1 tablet by mouth Every Night. PT TO HOLD NIGHT BEFORE SURGERY  Indications: High Blood Pressure Disorder       VITAMIN B 12 PO      Take 1 tablet by mouth Daily.       vitamin D 1.25 MG (21437 UT) capsule capsule  Commonly known as: ERGOCALCIFEROL      Take 1 capsule by mouth 1 (One) Time Per Week. SATURDAYS                Discharge Diet: Regular       Activity at Discharge: No bending, lifting, twisting. No driving until the first office visit       Call for: questions or concerns    Follow-up Appointments  Future Appointments   Date Time Provider Department Center   5/14/2024 11:15 AM Geno Andre APRN [facility]   7/9/2024 11:15 AM Tika Perry MD [facility]      Contact information for after-discharge care       Home Medical Care       [facility] .    Service: Home Health Services  Contact information:  [address]  [facility] [address] [phone]                                 The patient has a follow-up appointment scheduled with out office. She was instructed that she should notify our office  with any new or worsening pain and/or signs/symptoms of infection at the incision site.     I discussed the discharge instructions with patient    ROMINA Cat  04/27/24  09:34 EDT

## 2024-04-27 NOTE — PLAN OF CARE
Goal Outcome Evaluation:  Plan of Care Reviewed With: patient, daughter            Pt d/c, going home with her daughter in private vehicle. Belongings with patient and family.

## 2024-04-27 NOTE — DISCHARGE INSTRUCTIONS
BRITTNI FOWLER M.D.  [address], [address]  [place] [address]  [phone]          KYPHOPLASTY POST-OPERATIVE INSTRUCTIONS    1. You should have a follow up appointment scheduled to be seen in our office for approximately 10 days after the surgery. You will be seen at that time by our Physician Assistant or Nurse Practitioner. If you do not have an appointment already scheduled, please call our office when you get home from the hospital to schedule this post-operative visit.    2. You may ride home from the hospital in a car. You may NOT drive a vehicle prior to your first post-operative visit in our office.    3. Keep the dressing over the wound for the two (2) days. Keep the wound dry. IF you have staples/stiches clean your incision with peroxide three times daily. IF your incision is closed with glue, do not pick, scratch or rub the glue on the wound, so it does not loosen before the wound heals. Do not soak your wound in water until the glue film falls off. Avoid swimming or using a hot tub while the glue is in place. When you shower or bathe, let water run over the wound but do not rub. Pat the wound gently with a soft towel to dry. Avoid direct sunlight to the wound and do not use tanning beds or lamps with the glue film in place. Do not apply any cream, lotion, or ointment to the skin near the wound; it could loosen the glue before the wound heals. Do not apply any tape, sticky dressing, alcohol or Chloraprep to the glue site as these could loosen the glue.    4. Activities should be restricted. No lifting and no bending at the waist.    5. Climbing stairs is allowed but should be minimized; do not over exert yourself.    6. If you experience any abnormal pain or redness, swelling or oozing at the incision site, please call our office.    7. You will leave the hospital with medications for pain and possibly oral antibiotics if indicated. These medications must be taken as prescribed only. If a  refill of your pain medication is required, in order to have this medication refilled you must contact the office four days prior to the due date.

## 2024-04-27 NOTE — CASE MANAGEMENT/SOCIAL WORK
Case Management Discharge Note      Final Note: [place] home with [facility]    Provided Post Acute Provider List?: N/A    Selected Continued Care - Discharged on 4/27/2024 Admission date: 4/26/2024 - Discharge disposition: Home or Self Care      Destination    No services have been selected for the patient.                Durable Medical Equipment    No services have been selected for the patient.                Dialysis/Infusion    No services have been selected for the patient.                Home Medical Care Coordination complete.      Service Provider Selected Services Address Phone Fax Patient Preferred     [facility] [address] [address] [phone] --              Therapy    No services have been selected for the patient.                Community Resources    No services have been selected for the patient.                Community & DME    No services have been selected for the patient.                    Selected Continued Care - Prior Encounters Includes continued care and service providers with selected services from prior encounters from 1/27/2024 to 4/27/2024      Discharged on 4/5/2024 Admission date: 4/3/2024 - Discharge disposition: Home or Self Care      Home Medical Care       Service Provider Selected Services Address Phone Fax Patient Preferred     [facility] [address] [address] [phone] --                               Final Discharge Disposition Code: 06 - home with home health care

## 2024-04-27 NOTE — PROGRESS NOTES
Noted discharge today . Patient is current with [facility] and remained in outpatient status. Thank you !

## 2024-04-29 ENCOUNTER — TRANSCRIBE ORDERS (OUTPATIENT)
Dept: HOME HEALTH SERVICES | Facility: HOME HEALTHCARE | Age: 89
End: 2024-04-29
Payer: MEDICARE

## 2024-04-29 DIAGNOSIS — S22.070A COMPRESSION FRACTURE OF T9 VERTEBRA, INITIAL ENCOUNTER: Primary | ICD-10-CM

## 2024-04-29 LAB
LAB AP CASE REPORT: NORMAL
LAB AP SPECIAL STAINS: NORMAL
PATH REPORT.FINAL DX SPEC: NORMAL
PATH REPORT.GROSS SPEC: NORMAL

## 2024-04-30 ENCOUNTER — HOME CARE VISIT (OUTPATIENT)
Dept: HOME HEALTH SERVICES | Facility: HOME HEALTHCARE | Age: 89
End: 2024-04-30
Payer: MEDICARE

## 2024-04-30 VITALS
DIASTOLIC BLOOD PRESSURE: 50 MMHG | SYSTOLIC BLOOD PRESSURE: 90 MMHG | HEART RATE: 90 BPM | OXYGEN SATURATION: 95 % | RESPIRATION RATE: 18 BRPM | TEMPERATURE: 97.6 F

## 2024-04-30 DIAGNOSIS — M54.50 CHRONIC RIGHT-SIDED LOW BACK PAIN WITHOUT SCIATICA: ICD-10-CM

## 2024-04-30 DIAGNOSIS — G89.29 CHRONIC RIGHT-SIDED LOW BACK PAIN WITHOUT SCIATICA: ICD-10-CM

## 2024-04-30 PROCEDURE — G0151 HHCP-SERV OF PT,EA 15 MIN: HCPCS

## 2024-04-30 RX ORDER — GABAPENTIN 100 MG/1
100 CAPSULE ORAL 4 TIMES DAILY
Qty: 120 CAPSULE | Refills: 0 | Status: SHIPPED | OUTPATIENT
Start: 2024-04-30

## 2024-04-30 NOTE — HOME HEALTH
"___________________________  PHYSICAL THERAPY RESUMPTION OF CARE EVALUATION    REASON FOR REFERRAL:  89 yr old female with history of OSTEOPOROSIS, ATRIAL FIBRILLATION  with PACEMAKER, CAD & MULTIPLE FALLS who was hospitalized at [facility] 4/3/24 - 4/5/24 with new T9 COMPRESSION FRACTURE sustained in a fall while attempting to transfer out of bed.  She was prescribed a TLSO & discharged home with home health services.  Pt transferred to [facility] for inpatient Subacute rehab on 4/12/24.  She was readmitted to [facility] 4/26/24 & underwent a  T9 KYPHOPLASTY by Dr. Jo.  Pt discharged home 4/27/24 with resumption of care orders for PT services to address deficits in strength, balance & mobility.    PRECAUTIONS:  NO BENDING OR TWISTING     PERTINENT PAST MEDICAL HISTORY:    Atrial fibrillation    Arthritis    Cancer    Coronary artery disease    Disease of thyroid gland    Hyperlipidemia    Low back pain    Macular degeneration    Osteoporosis     PERTINENT PAST SURGICAL HISTORY:    CATARACT EXTRACTION (B)  COLON RESECTION- INTESTINAL OBBSTRUCTION  EPIDURAL BLOCK      PACEMAKER IMPLANTATION      PYLOROMYOTOMY      UMBILICAL HERNIA REPAIR      CODE STATUS:  DNR-  DO NOT RESUSCITATE BY PATIENT REQUEST    PARTICIPANTS PRESENT IN HOME:  Karuna Salazar & Daughter in law    PRIOR LEVEL OF FUNCTION:  Independent self care, transfers & ambulation with cane in the home & rollator outside of the home.    CAREGIVER:  DaughterShirley Salazar is primary CG & is very supportive. Daughter in law also supportive.  Both are alternating staying with patient.  Renu Bartlettnick scheduled to begin services 5/1/24.    HOME ENVIRONMENT: Lives independently in patio home with 1 step to enter.      MENTAL STATUS:  Alert & Oriented x 4    PATIENT GOAL FOR EPISODE OF CARE:  \"To get me back going the way I was\"    EDEMA:  None    WOUND / SKIN CONDITION:  2 small incisions on posterior back with staples intact are healing " well.    POSTURE:  Mod/severe thoracic kyphosis    MUSCLE TONE/COORDINATION:  WNL    SENSATION/PROPRIOCEPTION:  WNL    STRENGTH GRADES  (R) UE:  Shoulder limited due to rotator cuff tear.  Elbow, wrist & hand are WFL  (L) UE:  WFL  (B) LE's:  WNL    TIMED UP AND GO:  27 seconds- indicates SLOWER MOBILITY    (TUG Mobility: High > 10 secTypical 10-19Slower 20-29Diminished>30)    TINETTI SCORE:   16/28- indicates HIGH FALL RISK  (TINETTI FALL RISK SCORING: Under 19= High  19-24= Moderate  25 & up= Low)    PLAN TO TREAT: 2W3    FOCUS OF CARE:  IMPAIRED BALANCE & MOBILITY WITH HIGH RISK FOR FALLS RELATED TO T9 COMPRESSION FRACTURE     MEDICAL NECESSITY FOR ONGOING SKILLED PHYSICAL THERAPY:  Patient requires skilled physical therapy services to address deficits in balance & mobility impeding ability to safely perform functional transfers, bed mobility, ADL's & gait activities.  Patient is unable to ambulate the required distance to safely exit home for medical appointments.    PLAN FOR NEXT VISIT:  Progress HEP, transfer training, gait training

## 2024-04-30 NOTE — Clinical Note
"Emilie,    PT performed a resumption of care 4/30/24.  The following is a summary of my home visit:    REASON FOR REFERRAL:  89 yr old female with history of OSTEOPOROSIS, ATRIAL FIBRILLATION  with PACEMAKER, CAD & MULTIPLE FALLS who was hospitalized at [facility] 4/3/24 - 4/5/24 with new T9 COMPRESSION FRACTURE sustained in a fall while attempting to transfer out of bed.  She was prescribed a TLSO & discharged home with home health services.  Pt transferred to [facility] for inpatient Subacute rehab on 4/12/24.  She was readmitted to [facility] 4/26/24 & underwent a  T9 KYPHOPLASTY by Dr. Jo.  Pt discharged home 4/27/24 with resumption of care orders for PT services to address deficits in strength, balance & mobility.    PRECAUTIONS:  NO BENDING OR TWISTING     CODE STATUS:  DNR-  DO NOT RESUSCITATE BY PATIENT REQUEST    MEDICATION ISSUES:  Patient prescribed Vitamin B-12 (dosage unknown), 325 mg Ferrous Sulfate & 50,000 IU of Vitamin D3 weekly while at [facility], based on labs.  She was not discharged home with these medications & currently is not taking.  Notified Komal & she planned to contact facility for labs.  Pt is scheduled to f/u with you on 5/13/24.    PARTICIPANTS PRESENT IN HOME:  Daughter- Marisol Salazar & Daughter in law    CAREGIVER:  Daughter- Marisol Salazar is primary CG & is very supportive. Daughter in law also supportive.  Both are alternating staying with patient.  Renu Misha scheduled to begin services 5/1/24.    HOME ENVIRONMENT: Lives independently in patio home with 1 step to enter.      MENTAL STATUS:  Alert & Oriented x 4    PATIENT GOAL FOR EPISODE OF CARE:  \"To get me back going the way I was\"    EDEMA:  None    WOUND / SKIN CONDITION:  2 small incisions on posterior back with staples intact are healing well.  PT obtained orders to remove staples POD 10-14.    POSTURE:  Mod/severe thoracic kyphosis    STRENGTH GRADES  (R) UE:  Shoulder limited due to rotator cuff tear.  " Elbow, wrist & hand are WFL  (L) UE:  WFL  (B) LE's:  WNL     TRANSFERS: IN/OUT OF BED:  SBA/SUPERVISION with use of bed rail  SIT<>STAND:  SBA/SUPERVISION with Mod verbal cues  SHOWER:  SBA   TOILET:  SBA    GAIT: Patient ambulates with cane demonstrating reciprocal pattern with essentially symmetrical step length & wide base of support.    FOCUS OF CARE:  IMPAIRED BALANCE & MOBILITY WITH HIGH RISK FOR FALLS RELATED TO T9 COMPRESSION FRACTURE     MEDICAL NECESSITY FOR ONGOING SKILLED PHYSICAL THERAPY:  Patient requires skilled physical therapy services to address deficits in balance & mobility impeding ability to safely perform functional transfers, bed mobility, ADL's & gait activities.  Patient is unable to ambulate the required distance to safely exit home for medical appointments.    PLAN TO TREAT: 2W3.  Orders to be forwarded to your inbasket for authorization if you agree.  Pt would benefit from an OT evaluation- received verbal authorization from Komal.    Please feel free to contact me if you have any questions.   Thank you,         Maine Field, PT         [phone]

## 2024-04-30 NOTE — TELEPHONE ENCOUNTER
Rx Refill Note  Requested Prescriptions     Pending Prescriptions Disp Refills    gabapentin (NEURONTIN) 100 MG capsule [Pharmacy Med Name: GABAPENTIN 100MG CAPSULES] 120 capsule 0     Sig: TAKE 1 CAPSULE BY MOUTH FOUR TIMES DAILY      Last office visit with prescribing clinician: 3/28/2024   Last telemedicine visit with prescribing clinician: Visit date not found   Next office visit with prescribing clinician: 5/13/2024                         Would you like a call back once the refill request has been completed: [] Yes [] No    If the office needs to give you a call back, can they leave a voicemail: [] Yes [] No    Buzz Esqueda CMA/LMR  04/30/24, 09:16 EDT

## 2024-05-02 ENCOUNTER — HOME CARE VISIT (OUTPATIENT)
Dept: HOME HEALTH SERVICES | Facility: HOME HEALTHCARE | Age: 89
End: 2024-05-02
Payer: MEDICARE

## 2024-05-02 ENCOUNTER — TELEPHONE (OUTPATIENT)
Dept: FAMILY MEDICINE CLINIC | Facility: CLINIC | Age: 89
End: 2024-05-02
Payer: MEDICARE

## 2024-05-02 VITALS
RESPIRATION RATE: 18 BRPM | SYSTOLIC BLOOD PRESSURE: 126 MMHG | DIASTOLIC BLOOD PRESSURE: 54 MMHG | TEMPERATURE: 98.4 F | OXYGEN SATURATION: 92 % | HEART RATE: 92 BPM

## 2024-05-02 VITALS
OXYGEN SATURATION: 93 % | HEART RATE: 91 BPM | DIASTOLIC BLOOD PRESSURE: 72 MMHG | TEMPERATURE: 98.7 F | SYSTOLIC BLOOD PRESSURE: 110 MMHG

## 2024-05-02 PROCEDURE — G0152 HHCP-SERV OF OT,EA 15 MIN: HCPCS

## 2024-05-02 PROCEDURE — G0151 HHCP-SERV OF PT,EA 15 MIN: HCPCS | Performed by: PHYSICAL THERAPIST

## 2024-05-02 NOTE — HOME HEALTH
"Subjective: \"I am still just really tired\"    Changes in Medications: none    Any Falls Since Last Visit: none    Assessment: Patient seemed very quiet today and had very low energy.  she did very well with her walking aorund her home, and did well with her exercises.  She continues to require CGA for transfers and ambulation.    Plan For Next Visit:  - gait training  - balance training"

## 2024-05-03 NOTE — TELEPHONE ENCOUNTER
----- Message from Maribel H sent at 4/29/2024  3:29 PM EDT -----  Regarding: FW: Appointment Request  Contact: [phone]  Requested records for [place] and pt is scheduled for 5/13/24--was seen at [facility]-there is no TCM note.  ----- Message -----  From: Barbie Hernandez  Sent: 4/29/2024   3:22 PM EDT  To: Zeyad Umaña [facility]  Subject: Appointment Request                              That will work. She was inpatient at [facility]. She had a stay at [facility].

## 2024-05-04 NOTE — CASE COMMUNICATION
Anticipate OT 1w1, 2w3 for ADL, IADL, back precautions, home safety, falls prevention, monitor vital signs, including pulse oximetry, better breathing, upper extremity function/strength, therapeutic exercise, safety in home, and improve endurance and fatigue management with self care, and functional mobility with durable medical equipment as necessary.

## 2024-05-04 NOTE — HOME HEALTH
Patient said her dtr in law heloed her get a shower, but she would prefer for OT to help her from now on, but does not want a shower today. She said her worst problem is her short ness of breath.  Plan for next visit: ADL, IADL, DME, HEP, home safety, falls prevention, education, bathroom and kitchen mobility, activity tolerance, standing tolerance and balance  MEDICAL NECESSITY FOR ONGOING SKILLED THERAPY: Patient requires skilled occupational therapy for remediation of deficits to improve activities of daily living, home safety, falls prevention, monitor vital signs, including pulse oximetry, hand/ upper extremity function/range of motion/strength, therapeutic exercise, safety in home, and improve endurance and fatigue management with self care, and functional mobility with durable medical equipment as necessary

## 2024-05-07 ENCOUNTER — HOME CARE VISIT (OUTPATIENT)
Dept: HOME HEALTH SERVICES | Facility: HOME HEALTHCARE | Age: 89
End: 2024-05-07
Payer: MEDICARE

## 2024-05-07 VITALS
TEMPERATURE: 97.4 F | RESPIRATION RATE: 18 BRPM | HEART RATE: 89 BPM | DIASTOLIC BLOOD PRESSURE: 85 MMHG | SYSTOLIC BLOOD PRESSURE: 129 MMHG | OXYGEN SATURATION: 92 %

## 2024-05-07 PROCEDURE — G0152 HHCP-SERV OF OT,EA 15 MIN: HCPCS

## 2024-05-07 PROCEDURE — G0151 HHCP-SERV OF PT,EA 15 MIN: HCPCS

## 2024-05-08 VITALS
HEART RATE: 94 BPM | OXYGEN SATURATION: 93 % | DIASTOLIC BLOOD PRESSURE: 54 MMHG | SYSTOLIC BLOOD PRESSURE: 124 MMHG | RESPIRATION RATE: 18 BRPM | TEMPERATURE: 97 F

## 2024-05-09 ENCOUNTER — HOME CARE VISIT (OUTPATIENT)
Dept: HOME HEALTH SERVICES | Facility: HOME HEALTHCARE | Age: 89
End: 2024-05-09
Payer: MEDICARE

## 2024-05-09 VITALS
HEART RATE: 92 BPM | TEMPERATURE: 97.3 F | SYSTOLIC BLOOD PRESSURE: 104 MMHG | OXYGEN SATURATION: 95 % | RESPIRATION RATE: 18 BRPM | DIASTOLIC BLOOD PRESSURE: 52 MMHG

## 2024-05-09 VITALS
RESPIRATION RATE: 18 BRPM | TEMPERATURE: 99.2 F | SYSTOLIC BLOOD PRESSURE: 106 MMHG | DIASTOLIC BLOOD PRESSURE: 57 MMHG | OXYGEN SATURATION: 95 % | HEART RATE: 98 BPM

## 2024-05-09 PROCEDURE — G0152 HHCP-SERV OF OT,EA 15 MIN: HCPCS

## 2024-05-09 PROCEDURE — G0151 HHCP-SERV OF PT,EA 15 MIN: HCPCS | Performed by: PHYSICAL THERAPIST

## 2024-05-10 NOTE — PROGRESS NOTES
Subjective   Patient ID: Barbie Hernandez is a 89 y.o. female is here today for follow-up for Thoracic Nine kyphoplasty done on 4/26/24 by Dr. Jo. Accompanied by her son.     History of Present Illness    She was seen by Dr Jo in the office on April 12 status post fall with new onset severe back pain while standing.  She was initially treated with a brace, but unfortunately it became too uncomfortable for the patient to wear.  She elected to undergo the kyphoplasty procedure, which was performed on April 26, and and she follows up today.    She reports feeling much better now after the kyphoplasty procedure.  Prior, she was in intractable back pain that made it difficult for her to move.  Unfortunately, she is now dealing with pneumonia and was recently started on oral antibiotics.  She reports being very tired and having no energy.  Home health is still coming to see her but she really has difficulty participating with therapy as result of her fatigue.  The patient's son, whom accompanies her today, is concerned that she is not getting enough nutrition or water.    She ambulates with a straight cane when in her home, but uses the wheelchair for any long distance.    She denies any low back pain now, with the exception of some minimal discomfort due to sitting in the hard chair.        The following portions of the patient's history were reviewed and updated as appropriate: allergies, current medications, past family history, past medical history, past social history, past surgical history, and problem list.    Review of Systems   Constitutional:  Negative for chills and fever.   Cardiovascular:  Negative for leg swelling.   Gastrointestinal:  Positive for constipation.   Genitourinary:  Negative for difficulty urinating and enuresis.   Musculoskeletal:  Negative for back pain.   Skin:  Negative for wound (redness, swelling, drainage).   Neurological:  Positive for headaches. Negative for weakness  and numbness.   Psychiatric/Behavioral:  Negative for sleep disturbance.        /70   Pulse 93   Temp 97 °F (36.1 °C)   LMP  (LMP Unknown) Comment: menopause  SpO2 95%       Objective     Vitals:    05/14/24 1121   BP: 104/70   Pulse: 93   Temp: 97 °F (36.1 °C)   SpO2: 95%     There is no height or weight on file to calculate BMI.    Tobacco Use: Low Risk  (5/14/2024)    Patient History     Smoking Tobacco Use: Never     Smokeless Tobacco Use: Never     Passive Exposure: Not on file          Physical Exam  Vitals reviewed.   Constitutional:       Comments: Very pleasant, thin, frail elderly female   HENT:      Head: Atraumatic.   Pulmonary:      Effort: Pulmonary effort is normal.   Musculoskeletal:         General: Deformity (Thoracic kyphosis) present. No tenderness (Nontender to minimal palpation at the kyphoplasty site and surrounding muscles).      Comments: Moving all extremities well   Skin:     General: Skin is warm and dry.      Comments: Staples removed from kyphoplasty site by home health, sites are healing well   Neurological:      Mental Status: She is alert and oriented to person, place, and time.      GCS: GCS eye subscore is 4. GCS verbal subscore is 5. GCS motor subscore is 6.      Motor: Weakness (Generalized weakness) present. No tremor.      Gait: Gait abnormal (Not ambulating due to weakness and fatigue).   Psychiatric:         Mood and Affect: Mood normal.       Neurologic Exam     Mental Status   Oriented to person, place, and time.           Assessment & Plan   Independent Review of Radiographic Studies:      I personally reviewed the images from the following studies.    No new imaging    Medical Decision Making:      She returns the office today for first postop visit status post kyphoplasty for an acute osteoporotic compression fracture.  Exam as noted above, no red flags.  She is doing great with complete resolution of her preoperative back pain.  As mentioned above, she was  recently diagnosed with pneumonia and really has no energy.  I suggested adding protein shakes and increasing her water intake.  She recently started on the antibiotics and will hopefully start to feel better soon.  I recommend that she continue with home health physical therapy to work on her strength and stamina.  From our standpoint she is stable and we will see her back on an as-needed basis.  If she develops any sudden onset back pain, similar to what she experienced before, she will let us know.    Plan: Return to office as needed    Diagnoses and all orders for this visit:    1. Compression fracture of T9 vertebra, subsequent encounter (Primary)    2. Osteoporotic compression fracture of vertebra with routine healing, subsequent encounter    3. H/O kyphoplasty      Return if symptoms worsen or fail to improve.

## 2024-05-13 ENCOUNTER — OFFICE VISIT (OUTPATIENT)
Dept: FAMILY MEDICINE CLINIC | Facility: CLINIC | Age: 89
End: 2024-05-13
Payer: MEDICARE

## 2024-05-13 ENCOUNTER — HOME CARE VISIT (OUTPATIENT)
Dept: HOME HEALTH SERVICES | Facility: HOME HEALTHCARE | Age: 89
End: 2024-05-13
Payer: MEDICARE

## 2024-05-13 VITALS
TEMPERATURE: 97.1 F | WEIGHT: 131 LBS | DIASTOLIC BLOOD PRESSURE: 56 MMHG | SYSTOLIC BLOOD PRESSURE: 100 MMHG | HEIGHT: 62 IN | OXYGEN SATURATION: 92 % | HEART RATE: 91 BPM | BODY MASS INDEX: 24.11 KG/M2

## 2024-05-13 DIAGNOSIS — R05.1 ACUTE COUGH: ICD-10-CM

## 2024-05-13 DIAGNOSIS — D50.8 OTHER IRON DEFICIENCY ANEMIA: ICD-10-CM

## 2024-05-13 DIAGNOSIS — N39.41 URGE INCONTINENCE: ICD-10-CM

## 2024-05-13 DIAGNOSIS — E03.8 OTHER SPECIFIED HYPOTHYROIDISM: ICD-10-CM

## 2024-05-13 DIAGNOSIS — M80.88XD OSTEOPOROTIC COMPRESSION FRACTURE OF VERTEBRA WITH ROUTINE HEALING, SUBSEQUENT ENCOUNTER: ICD-10-CM

## 2024-05-13 DIAGNOSIS — R53.82 CHRONIC FATIGUE: ICD-10-CM

## 2024-05-13 DIAGNOSIS — J15.9 HOSPITAL-ACQUIRED BACTERIAL PNEUMONIA: ICD-10-CM

## 2024-05-13 DIAGNOSIS — S22.070A COMPRESSION FRACTURE OF T9 VERTEBRA, INITIAL ENCOUNTER: ICD-10-CM

## 2024-05-13 DIAGNOSIS — R06.02 SHORTNESS OF BREATH: Primary | ICD-10-CM

## 2024-05-13 PROCEDURE — 1159F MED LIST DOCD IN RCRD: CPT | Performed by: FAMILY MEDICINE

## 2024-05-13 PROCEDURE — 1126F AMNT PAIN NOTED NONE PRSNT: CPT | Performed by: FAMILY MEDICINE

## 2024-05-13 PROCEDURE — 1160F RVW MEDS BY RX/DR IN RCRD: CPT | Performed by: FAMILY MEDICINE

## 2024-05-13 PROCEDURE — 99215 OFFICE O/P EST HI 40 MIN: CPT | Performed by: FAMILY MEDICINE

## 2024-05-13 PROCEDURE — 71046 X-RAY EXAM CHEST 2 VIEWS: CPT | Performed by: FAMILY MEDICINE

## 2024-05-13 PROCEDURE — 1111F DSCHRG MED/CURRENT MED MERGE: CPT | Performed by: FAMILY MEDICINE

## 2024-05-13 RX ORDER — ALBUTEROL SULFATE 90 UG/1
2 AEROSOL, METERED RESPIRATORY (INHALATION) EVERY 4 HOURS PRN
Qty: 18 G | Refills: 6 | Status: SHIPPED | OUTPATIENT
Start: 2024-05-13 | End: 2024-05-16

## 2024-05-13 RX ORDER — VIBEGRON 75 MG/1
75 TABLET, FILM COATED ORAL DAILY
Qty: 90 TABLET | Refills: 2 | Status: SHIPPED | OUTPATIENT
Start: 2024-05-13

## 2024-05-13 RX ORDER — LEVOFLOXACIN 500 MG/1
500 TABLET, FILM COATED ORAL DAILY
Qty: 7 TABLET | Refills: 0 | Status: SHIPPED | OUTPATIENT
Start: 2024-05-13 | End: 2024-05-20

## 2024-05-13 NOTE — PROGRESS NOTES
Transitional Care Follow Up Visit  Subjective     Barbie Hernandez is a 89 y.o. female who presents for a transitional care management visit.    Within 48 business hours after discharge our office contacted her via telephone to coordinate her care and needs.      I reviewed and discussed the details of that call along with the discharge summary, hospital problems, inpatient lab results, inpatient diagnostic studies, and consultation reports with Barbie.     Current outpatient and discharge medications have been reconciled for the patient.  Reviewed by: Tika Perry MD          4/5/2024     4:20 PM   Date of TCM Phone Call   [facility]   Date of Admission 4/3/2024   Date of Discharge 4/5/2024   Discharge Disposition Home or Self Care     Risk for Readmission (LACE) No data recorded    Shortness of Breath       Course During Hospital Stay:  Pleasant 89-year-old female to follow-up for hospitalization although up at [facility].  She was admitted April 3 and discharged April 5, 2025.  She was then transitioned to [facility] from April 5 until April 27, 2024 to home.  She has been seen by physical therapy, Occupational Therapy with good improvement.    She was admitted for a fall when trying to get out of bed at the end of March and was in severe thoracic pain, found to have a compression fracture around T9.  She was given a TLSO brace and was discharged home but was seen by neurosurgery April 12.  Patient unable to tolerate the brace, recommendations for kyphoplasty.  She was admitted for this surgery and thankfully had improvement in her pain and was able to ambulate with a cane before discharge to subacute rehab.     At discharge she had a mild anemia with a hemoglobin of 11.9, mildly elevated blood sugar at 125 but remaining labs reassuring.    For the last week she had shortness of breath,  but her energy is sapped.  About 1 week of shortness of breath on exertion.  She does have a dry cough,  and worst when she wakes up in the morning but she is having coughing throughout the day, no fevers or chills.    She was not retaining urine and was diagnosed with an over active bladder, she was started on Gemtesa for urge incontinence which was very helpful, she has not been on other medications in the past but not having adverse effects, she thinks that this medication has been particularly helpful.    The following portions of the patient's history were reviewed and updated as appropriate: allergies, current medications, past family history, past medical history, past social history, past surgical history, and problem list.    Review of Systems   Respiratory:  Positive for shortness of breath.        Objective   Physical Exam  Vitals and nursing note reviewed.   Constitutional:       General: She is not in acute distress.     Appearance: She is well-developed.   HENT:      Head: Normocephalic.      Nose: Nose normal.   Cardiovascular:      Rate and Rhythm: Normal rate and regular rhythm.      Heart sounds: Normal heart sounds. No murmur heard.  Pulmonary:      Effort: Pulmonary effort is normal. No respiratory distress.      Breath sounds: Wheezing and rales present.      Comments: Crackles on the left lateral chest, lower portion along the mid axillary line  Musculoskeletal:         General: Normal range of motion.   Skin:     General: Skin is warm and dry.      Findings: No rash.   Neurological:      Mental Status: She is alert and oriented to person, place, and time.   Psychiatric:         Behavior: Behavior normal.         Thought Content: Thought content normal.         Judgment: Judgment normal.     A PA and lateral Chest X-Ray was ordered. My reading of this film is diffuse consolidation, more opacity on the left lower quadrant, borderline cardiomegaly, some possible fluid as well but otherwise stable.  Kyphoplasty evident, no masses.. (No comparison films available: pending review by  Radiologist.)      Assessment & Plan   Diagnoses and all orders for this visit:    1. Shortness of breath (Primary)  -     XR Chest PA & Lateral  -     proBNP    2. Chronic fatigue    3. Osteoporotic compression fracture of vertebra with routine healing, subsequent encounter    4. Compression fracture of T9 vertebra, initial encounter    5. Hospital-acquired bacterial pneumonia  -     levoFLOXacin (Levaquin) 500 MG tablet; Take 1 tablet by mouth Daily for 7 days.  Dispense: 7 tablet; Refill: 0  -     Incentive Spirometry; Future    6. Urge incontinence  -     Vibegron (Gemtesa) 75 MG tablet; Take 1 tablet by mouth Daily. Indications: Overactive Bladder, Urinary Incontinence  Dispense: 90 tablet; Refill: 2    7. Other specified hypothyroidism  -     TSH Rfx On Abnormal To Free T4    8. Other iron deficiency anemia  -     Comprehensive Metabolic Panel  -     CBC & Differential  -     Iron Profile    9. Acute cough  -     albuterol sulfate  (90 Base) MCG/ACT inhaler; Inhale 2 puffs Every 4 (Four) Hours As Needed for Wheezing or Shortness of Air.  Dispense: 18 g; Refill: 6    Pleasant 89-year-old female here for hospital follow-up after a fall at home.  She is here with her son.  From kyphoplasty/compression fracture standpoint she is actually doing much better, she is in almost no pain from her back..    However she has a few new problems.    1: Exertional dyspnea, I think this is likely from pneumonia but there is possibly an element of volume overload today.  Will get BMP as well.  Labs as above.  Weight stable, do not think Lasix is needed at this moment but will continue to follow closely.    2: Cough, left lower lobe pneumonia, she was recently discharged from subacute rehab after recent fall and compression fracture we will go ahead and treat her with Levaquin.  Renal function is good.  Sample of breztri given, 2 puffs twice a day for 7 days.  Okay to use albuterol as needed.  Spirometer given to help  inflate the lungs.  Follow-up in 1 week.  We discussed if there is any interval worsening to go to the ER.    3: Urgent continence, okay to continue taking Gemtesa which was started in the hospital, this seems to have been very helpful to reduce urge incontinence, not having adverse effects.    40 minutes spent with patient and her son, obtaining history, reviewing medical records, performing exam, and documenting in medical record.    2 minutes spent separately ordering and reviewing the x-ray which is not included in the time above.    Return in about 1 week (around 5/20/2024), or if symptoms worsen or fail to improve, for Recheck ok to overbook as my last patient on Monday, thanks!.    Tika Perry MD

## 2024-05-14 ENCOUNTER — TELEPHONE (OUTPATIENT)
Dept: FAMILY MEDICINE CLINIC | Facility: CLINIC | Age: 89
End: 2024-05-14
Payer: MEDICARE

## 2024-05-14 ENCOUNTER — OFFICE VISIT (OUTPATIENT)
Dept: NEUROSURGERY | Facility: CLINIC | Age: 89
End: 2024-05-14
Payer: MEDICARE

## 2024-05-14 VITALS
DIASTOLIC BLOOD PRESSURE: 70 MMHG | OXYGEN SATURATION: 95 % | TEMPERATURE: 97 F | HEART RATE: 93 BPM | SYSTOLIC BLOOD PRESSURE: 104 MMHG

## 2024-05-14 DIAGNOSIS — Z98.890 H/O KYPHOPLASTY: ICD-10-CM

## 2024-05-14 DIAGNOSIS — S22.070A COMPRESSION FRACTURE OF T9 VERTEBRA, INITIAL ENCOUNTER: Primary | ICD-10-CM

## 2024-05-14 DIAGNOSIS — M80.88XD OSTEOPOROTIC COMPRESSION FRACTURE OF VERTEBRA WITH ROUTINE HEALING, SUBSEQUENT ENCOUNTER: ICD-10-CM

## 2024-05-14 LAB
ALBUMIN SERPL-MCNC: 3.2 G/DL (ref 3.5–5.2)
ALBUMIN/GLOB SERPL: 0.9 G/DL
ALP SERPL-CCNC: 65 U/L (ref 39–117)
ALT SERPL-CCNC: 10 U/L (ref 1–33)
AST SERPL-CCNC: 18 U/L (ref 1–32)
BASOPHILS # BLD AUTO: 0.05 10*3/MM3 (ref 0–0.2)
BASOPHILS NFR BLD AUTO: 0.6 % (ref 0–1.5)
BILIRUB SERPL-MCNC: 0.5 MG/DL (ref 0–1.2)
BUN SERPL-MCNC: 20 MG/DL (ref 8–23)
BUN/CREAT SERPL: 21.3 (ref 7–25)
CALCIUM SERPL-MCNC: 8.7 MG/DL (ref 8.2–9.6)
CHLORIDE SERPL-SCNC: 104 MMOL/L (ref 98–107)
CO2 SERPL-SCNC: 22.7 MMOL/L (ref 22–29)
CREAT SERPL-MCNC: 0.94 MG/DL (ref 0.57–1)
EGFRCR SERPLBLD CKD-EPI 2021: 57.4 ML/MIN/1.73
EOSINOPHIL # BLD AUTO: 0.26 10*3/MM3 (ref 0–0.4)
EOSINOPHIL NFR BLD AUTO: 3 % (ref 0.3–6.2)
ERYTHROCYTE [DISTWIDTH] IN BLOOD BY AUTOMATED COUNT: 13.9 % (ref 12.3–15.4)
GLOBULIN SER CALC-MCNC: 3.6 GM/DL
GLUCOSE SERPL-MCNC: 86 MG/DL (ref 65–99)
HCT VFR BLD AUTO: 31.6 % (ref 34–46.6)
HGB BLD-MCNC: 10.3 G/DL (ref 12–15.9)
IMM GRANULOCYTES # BLD AUTO: 0.03 10*3/MM3 (ref 0–0.05)
IMM GRANULOCYTES NFR BLD AUTO: 0.3 % (ref 0–0.5)
IRON SATN MFR SERPL: 15 % (ref 20–50)
IRON SERPL-MCNC: 32 MCG/DL (ref 37–145)
LYMPHOCYTES # BLD AUTO: 0.99 10*3/MM3 (ref 0.7–3.1)
LYMPHOCYTES NFR BLD AUTO: 11.4 % (ref 19.6–45.3)
MCH RBC QN AUTO: 29.3 PG (ref 26.6–33)
MCHC RBC AUTO-ENTMCNC: 32.6 G/DL (ref 31.5–35.7)
MCV RBC AUTO: 89.8 FL (ref 79–97)
MONOCYTES # BLD AUTO: 1.28 10*3/MM3 (ref 0.1–0.9)
MONOCYTES NFR BLD AUTO: 14.8 % (ref 5–12)
NEUTROPHILS # BLD AUTO: 6.04 10*3/MM3 (ref 1.7–7)
NEUTROPHILS NFR BLD AUTO: 69.9 % (ref 42.7–76)
NRBC BLD AUTO-RTO: 0 /100 WBC (ref 0–0.2)
NT-PROBNP SERPL-MCNC: 995 PG/ML (ref 0–738)
PLATELET # BLD AUTO: 302 10*3/MM3 (ref 140–450)
POTASSIUM SERPL-SCNC: 5.2 MMOL/L (ref 3.5–5.2)
PROT SERPL-MCNC: 6.8 G/DL (ref 6–8.5)
RBC # BLD AUTO: 3.52 10*6/MM3 (ref 3.77–5.28)
SODIUM SERPL-SCNC: 137 MMOL/L (ref 136–145)
T4 FREE SERPL-MCNC: 1.28 NG/DL (ref 0.93–1.7)
TIBC SERPL-MCNC: 216 MCG/DL
TSH SERPL DL<=0.005 MIU/L-ACNC: 15.1 UIU/ML (ref 0.27–4.2)
UIBC SERPL-MCNC: 184 MCG/DL (ref 112–346)
WBC # BLD AUTO: 8.65 10*3/MM3 (ref 3.4–10.8)

## 2024-05-14 PROCEDURE — 99024 POSTOP FOLLOW-UP VISIT: CPT | Performed by: NURSE PRACTITIONER

## 2024-05-14 PROCEDURE — 1160F RVW MEDS BY RX/DR IN RCRD: CPT | Performed by: NURSE PRACTITIONER

## 2024-05-14 PROCEDURE — 1159F MED LIST DOCD IN RCRD: CPT | Performed by: NURSE PRACTITIONER

## 2024-05-14 NOTE — TELEPHONE ENCOUNTER
She is doing much better ,they would like to cancel Monday apt and reschedule 6-8 week to repeat xray ,thank you ,please call  [phone]

## 2024-05-14 NOTE — TELEPHONE ENCOUNTER
If she is doing better ,is ok to move her 5/20 apt for 6-8 weeks f/u with xray ? Or dr Perry still want her to be seen anyway next Monday

## 2024-05-14 NOTE — TELEPHONE ENCOUNTER
I think it is isolated to the left lung based on her exam and symptoms of pressure on the left side although there are abnormal findings on both sides.  The abnormals findings on the right side could be related to not taking a deep for breath versus an actual pneumonia.

## 2024-05-14 NOTE — TELEPHONE ENCOUNTER
Caller: MELE GROVES    Relationship: Child    Best call back number: [phone]    What is the best time to reach you: ANYTIME     Who are you requesting to speak with (clinical staff, provider,  specific staff member): CLINICAL     What was the call regarding: PATIENTS SON WOULD LIKE TO KNOW IF THE PATIENTS PNEUMONIA IS ISOLATED, OR IN BOTH LUNGS.     PLEASE ADVISE

## 2024-05-14 NOTE — TELEPHONE ENCOUNTER
Yes, if she is doing better that is just fine.  And I would want to do an interval x-ray in 6 to 8 weeks.  Thank you

## 2024-05-15 ENCOUNTER — HOME CARE VISIT (OUTPATIENT)
Dept: HOME HEALTH SERVICES | Facility: HOME HEALTHCARE | Age: 89
End: 2024-05-15
Payer: MEDICARE

## 2024-05-15 VITALS
DIASTOLIC BLOOD PRESSURE: 59 MMHG | SYSTOLIC BLOOD PRESSURE: 94 MMHG | RESPIRATION RATE: 18 BRPM | OXYGEN SATURATION: 88 % | TEMPERATURE: 97.8 F | HEART RATE: 92 BPM

## 2024-05-15 PROCEDURE — G0151 HHCP-SERV OF PT,EA 15 MIN: HCPCS | Performed by: PHYSICAL THERAPIST

## 2024-05-15 NOTE — TELEPHONE ENCOUNTER
Left detailed vm on 5/15 @9:07 am. FILIBERTO mckenna to read and schedule patient 6-8 wks out with Dr. Perry.

## 2024-05-15 NOTE — HOME HEALTH
"Subjective: \"I am very weak overall, I was diagnosed with pneumonia on monday and was given new medications\"    Changes in Medications: yes 2 inhalers, and an antibiotic    Any Falls Since Last Visit: none    Assessment: Patient has had a slight decline in her status due to getting penumonia resulting in her having to take an antibiotic as well as using 2 inhalers, patient and her caregiver had difficulty using her inhaler and was instructed how to properly use it however she is low on uses so a cc was put in to her PCP to get another prescription for it.  Patient had been making progress however due to the pneumonia she has begun to have difficuly with her breathing making performing ADLs and IADLs more challenging for her, since last week she has not been able to ambulate for distances outside of the home due to difficulty breathing.  Skilled physical therapy continues to be medically necessary to increase her strength, endurance, and gait training to restore her to PLOF, she has made improvement with her transfers being able to perform them with SBA.    Plan For Next Visit:  - gait training  - HEP"

## 2024-05-15 NOTE — CASE COMMUNICATION
Patient missed an occupational therapy visit from [facility] on May 13, 2024.     Reason: MD Appointment.       For your records only.   Per CMS Guidance, MD must be notified of missed/cancelled visits; therefore the prescribed frequency was not met.

## 2024-05-15 NOTE — CASE COMMUNICATION
Nikolai Perry,    I am here with Ms. Hernandez, she has been having trouble using the breztri inhaler and I have shown her and her caregiver how to dispense it, she is unable to do it on her own due to  strength however the caregiver was able to do it properly.  She is almost out of inhalations on the inhaler it only shows 2 left.  Can you write her another order for the inhaler?    Thank you,  Krista Lynn, PT, DPT  [facility]

## 2024-05-16 ENCOUNTER — TELEPHONE (OUTPATIENT)
Dept: FAMILY MEDICINE CLINIC | Facility: CLINIC | Age: 89
End: 2024-05-16
Payer: MEDICARE

## 2024-05-16 ENCOUNTER — HOME CARE VISIT (OUTPATIENT)
Dept: HOME HEALTH SERVICES | Facility: HOME HEALTHCARE | Age: 89
End: 2024-05-16
Payer: MEDICARE

## 2024-05-16 VITALS
DIASTOLIC BLOOD PRESSURE: 52 MMHG | HEART RATE: 84 BPM | TEMPERATURE: 98.2 F | RESPIRATION RATE: 18 BRPM | SYSTOLIC BLOOD PRESSURE: 110 MMHG | OXYGEN SATURATION: 89 %

## 2024-05-16 DIAGNOSIS — I50.9 CONGESTIVE HEART FAILURE, UNSPECIFIED HF CHRONICITY, UNSPECIFIED HEART FAILURE TYPE: ICD-10-CM

## 2024-05-16 DIAGNOSIS — J15.9 HOSPITAL-ACQUIRED BACTERIAL PNEUMONIA: ICD-10-CM

## 2024-05-16 DIAGNOSIS — R05.1 ACUTE COUGH: Primary | ICD-10-CM

## 2024-05-16 DIAGNOSIS — R53.82 CHRONIC FATIGUE: ICD-10-CM

## 2024-05-16 DIAGNOSIS — R06.02 SHORTNESS OF BREATH: ICD-10-CM

## 2024-05-16 PROCEDURE — G0152 HHCP-SERV OF OT,EA 15 MIN: HCPCS

## 2024-05-16 RX ORDER — FUROSEMIDE 40 MG/1
40 TABLET ORAL DAILY PRN
Qty: 30 TABLET | Refills: 2 | Status: SHIPPED | OUTPATIENT
Start: 2024-05-16 | End: 2024-05-20 | Stop reason: SDUPTHER

## 2024-05-16 RX ORDER — ALBUTEROL SULFATE 0.63 MG/3ML
1 SOLUTION RESPIRATORY (INHALATION) EVERY 6 HOURS PRN
Qty: 100 EACH | Refills: 0 | Status: SHIPPED | OUTPATIENT
Start: 2024-05-16

## 2024-05-16 NOTE — TELEPHONE ENCOUNTER
Thank you for letting me now, I actually think what we should do is transition to a nebulizer I think that will be a little bit easier.  I sent a prescription of the nebulized albuterol to the pharmacy and then we can send a nebulizer machine to the DME group.  Dee do mind helping with the paperwork for this?    I can call at the end of the day for the conversation about hospice.

## 2024-05-16 NOTE — TELEPHONE ENCOUNTER
Spoke with the  pt and with the care giver nurse and both agreed to go to emergency   due to oxygen level ,bp and energy  going down .

## 2024-05-16 NOTE — PROGRESS NOTES
Discussed results with family, plan to start Lasix, will wait on making any changes to thyroid as it could be off related to recent hospitalization/subacute rehab stay.  We can reassess in a month.  Follow-up with me on Monday for volume overload, patient requesting to not be hospitalized/or go to ER.

## 2024-05-16 NOTE — CASE COMMUNICATION
Dear Dr. Perry,    Due to Ms. Hernandez's new diagnosis of pneumonia she is has had a decrease in strength and endurance requiring continued  PT to restore her to PLOF.  Requesting continued frequency of 2w2 to increase her strength, balance, gait training, and fall risk safety to restore her to PLOF.    Thank you,  Krista Lynn, PT, DPT  [facility]

## 2024-05-16 NOTE — TELEPHONE ENCOUNTER
Per Joyce with [facility], patient is not improving. Patient's oxygen level is 89 and her BP is 110/52.  Patient is very lethargic.    Please advise.    Joyce, [facility], [phone]

## 2024-05-16 NOTE — TELEPHONE ENCOUNTER
I called the patient and her daughter Marisol for her symptoms of ongoing shortness of breath, she does not feel that the antibiotics are as effective now and does acknowledge that her oxygen levels have been lower.  She is able to speak in full sentences.  However she is not wanting to go back to the emergency room or to the hospital.  She was wanting to potentially discuss hospice but did not realize that this would mean that we would stop treatment for other chronic illnesses, but she does want to have her goals transitioned to be full treatment but at home.    Some of this will be challenging with her existing symptoms.  We discussed her recent labs which do show an elevated BNP which I think could be a potential cause of her symptoms as well with volume overload.    Plan:  - Continue course of Levaquin  - Start albuterol nebulizer, prescription for medication sent to [facility] and nebulizer sent to [facility].  - Start Lasix 40 mg daily to help with volume overload  - Referral to palliative care as above  - Follow-up with me on Monday, find a place to squeeze her in.  Unfortunate [place] and we will see about having someone aware of her case to cross cover.       staff, please schedule patient appointment at 1230, she will be my last patient of the day.  Thank you

## 2024-05-16 NOTE — TELEPHONE ENCOUNTER
Patient has declined to proceed to the ER. Marisol (daughter) is curious if  can prescribe a spacer for patients inhaler? Patient is having great difficulty using inhaler by itself. Marisol is also requesting 's recommendations for [facility]? Please advise

## 2024-05-17 ENCOUNTER — TELEPHONE (OUTPATIENT)
Dept: FAMILY MEDICINE CLINIC | Facility: CLINIC | Age: 89
End: 2024-05-17
Payer: MEDICARE

## 2024-05-17 ENCOUNTER — HOME CARE VISIT (OUTPATIENT)
Dept: HOME HEALTH SERVICES | Facility: HOME HEALTHCARE | Age: 89
End: 2024-05-17
Payer: MEDICARE

## 2024-05-17 VITALS
SYSTOLIC BLOOD PRESSURE: 103 MMHG | OXYGEN SATURATION: 90 % | RESPIRATION RATE: 18 BRPM | DIASTOLIC BLOOD PRESSURE: 61 MMHG | HEART RATE: 92 BPM | TEMPERATURE: 97.9 F

## 2024-05-17 PROCEDURE — G0151 HHCP-SERV OF PT,EA 15 MIN: HCPCS | Performed by: PHYSICAL THERAPIST

## 2024-05-17 NOTE — HOME HEALTH
Patient said she feels worse now, and got an inhaler but is not strong enough to push it to get it to work.  OT called the PCP, Dr Perry to report low O2 and that she is still not feeling well, but had to leave a message and now awaiting a call back.  Plan for next visit: ADL, bathroom mobility, DME, HEP, UE strength and coordination, standing and activity tolerance, education, falls prevention, home safety  MEDICAL NECESSITY FOR ONGOING SKILLED THERAPY: Patient requires skilled occupational therapy for remediation of deficits to improve activities of daily living, home safety, falls prevention, monitor vital signs, including pulse oximetry, hand/ upper extremity function/range of motion/strength, therapeutic exercise, safety in home, and improve endurance and fatigue management with self care, and functional mobility with durable medical equipment as necessary

## 2024-05-17 NOTE — TELEPHONE ENCOUNTER
We did discuss trying to keep her at home, I am planning to see her on Monday and we could potentially see if we could get the oxygen set up at home.     We can try to get oxygen set up at home but it often is a lot of back-and-forth with insurance, my anticipation is that she will have to pay the cash price and the insurance will not pay for it.  If she is willing to pay the cash price we can go ahead and place the order.  I would want to be sure that she would use it before we go through the whole process.  It is a bit of work.  Were happy to do it but want to make sure she would use it.    Also, I did place the palliative care order as well, hopefully that would be help

## 2024-05-17 NOTE — TELEPHONE ENCOUNTER
"Krista ( [facility]) called to report. While sitting/resting, patients oxygen read 90%. Patient walked about 20 feet, patients oxygen went down to 78%. Rescue inhaler was administered, oxygen went back up to 86%. Patient declined to proceed to the hospital. Patient is very fatigued and gets dizzy with simple care routine activities like taking a shower. Krista mentioned to patient maybe starting supplemental oxygen therapy? Patient was \"iffy about it\" What would  suggest?   "

## 2024-05-17 NOTE — HOME HEALTH
"Subjective: \"I am starting to feel a little better, my MD put me on lasix and gave me a nebulizer since I was struggling with the inhaler, I have discussed with my MD that I do not want to go to the ER\"    Changes in Medications: lasix, nebulizer with albuterol    Any Falls Since Last Visit: none    Assessment: Ms. Hernandez is struggling a lot with her breathing and her oxygen saturation being very low.  Encouraged her to use her rollator for ambulation to reduce fall risk.  Patient expressed her wishes to her MD that she does not want to go to the ER for treatment unless she falls and has a fracture.  I called her MD office and informed them of her low oxygen even with her medication including her fast acting inhaler. Recommended to the MD some supplemental oxygen for when she is getting up to go to the bathroom due to the severe drop in saturation.    Plan For Next Visit:  - gait training"

## 2024-05-20 ENCOUNTER — OFFICE VISIT (OUTPATIENT)
Dept: FAMILY MEDICINE CLINIC | Facility: CLINIC | Age: 89
End: 2024-05-20
Payer: MEDICARE

## 2024-05-20 VITALS
HEIGHT: 62 IN | BODY MASS INDEX: 23.81 KG/M2 | DIASTOLIC BLOOD PRESSURE: 60 MMHG | SYSTOLIC BLOOD PRESSURE: 100 MMHG | HEART RATE: 96 BPM | OXYGEN SATURATION: 89 % | TEMPERATURE: 97.3 F | WEIGHT: 129.4 LBS

## 2024-05-20 DIAGNOSIS — K59.04 CHRONIC IDIOPATHIC CONSTIPATION: ICD-10-CM

## 2024-05-20 DIAGNOSIS — R06.02 SHORTNESS OF BREATH: Primary | ICD-10-CM

## 2024-05-20 DIAGNOSIS — I50.41 ACUTE COMBINED SYSTOLIC AND DIASTOLIC CONGESTIVE HEART FAILURE: ICD-10-CM

## 2024-05-20 DIAGNOSIS — J15.9 HOSPITAL-ACQUIRED BACTERIAL PNEUMONIA: ICD-10-CM

## 2024-05-20 DIAGNOSIS — I50.9 CONGESTIVE HEART FAILURE, UNSPECIFIED HF CHRONICITY, UNSPECIFIED HEART FAILURE TYPE: ICD-10-CM

## 2024-05-20 PROCEDURE — 1126F AMNT PAIN NOTED NONE PRSNT: CPT | Performed by: FAMILY MEDICINE

## 2024-05-20 PROCEDURE — 99215 OFFICE O/P EST HI 40 MIN: CPT | Performed by: FAMILY MEDICINE

## 2024-05-20 RX ORDER — FUROSEMIDE 40 MG/1
40 TABLET ORAL 2 TIMES DAILY PRN
Qty: 60 TABLET | Refills: 1 | Status: SHIPPED | OUTPATIENT
Start: 2024-05-20 | End: 2024-07-19

## 2024-05-20 RX ORDER — AMOXICILLIN AND CLAVULANATE POTASSIUM 875; 125 MG/1; MG/1
1 TABLET, FILM COATED ORAL 2 TIMES DAILY
Qty: 14 TABLET | Refills: 0 | Status: SHIPPED | OUTPATIENT
Start: 2024-05-20 | End: 2024-05-27

## 2024-05-20 RX ORDER — SENNOSIDES A AND B 8.6 MG/1
1 TABLET, FILM COATED ORAL 2 TIMES DAILY
Qty: 60 TABLET | Refills: 2 | Status: SHIPPED | OUTPATIENT
Start: 2024-05-20

## 2024-05-20 RX ORDER — AZITHROMYCIN 250 MG/1
TABLET, FILM COATED ORAL
Qty: 6 TABLET | Refills: 0 | Status: SHIPPED | OUTPATIENT
Start: 2024-05-20

## 2024-05-20 NOTE — PROGRESS NOTES
"Chief Complaint  Congestive Heart Failure (Discuss patients decline here with family)    Subjective        Barbie Hernandez presents to [facility]  History of Present Illness  Pleasant 89-year-old female here for shortness of breath that has been gradually worsening with new findings of hypoxemia.  She was seen last in the office May 13 for similar symptoms of wheezing, diagnosed with hospital-acquired pneumonia she had just gotten out of acute rehab for a thoracic fracture.  I gave her a sample of Breztri, albuterol and recommendations to follow-up today.    When she got home she did not feel that the Levaquin improved her symptoms, the Breztri was helpful although she was not able to use or squeeze the albuterol inhaler.  This was transition to a nebulizer that was not giving any improvement.    Her oxygen levels have been dropping, we discussed the need to go to the emergency room but at this time she does not want to go to the ER.  She does want treatment for this condition but does not want to be hospitalized or go to the hospital.  She is open to considering hospice and was asking for this last week.  We discussed her wishes in more detail and it sounds like she is wanting more palliative care than hospice at the last conversation.    Today: She is not feeling any improvement with the diuresis with Lasix or treatment with Levaquin, inhalers.  She also is on oxygen currently she arrived with O2 levels around 70 and does not feel any better.  She has been having oxygen levels in the 80s most of the time and occasional 70s.  However she is not ready to start oxygen.  She finds the tubes cumbersome and bothersome    Objective   Vital Signs:  /60 (BP Location: Right arm, Patient Position: Sitting, Cuff Size: Adult)   Pulse 96   Temp 97.3 °F (36.3 °C)   Ht 157.5 cm (62\")   Wt 58.7 kg (129 lb 6.4 oz)   SpO2 (!) 89%   BMI 23.67 kg/m²   Estimated body mass index is 23.67 " "kg/m² as calculated from the following:    Height as of this encounter: 157.5 cm (62\").    Weight as of this encounter: 58.7 kg (129 lb 6.4 oz).       BMI is within normal parameters. No other follow-up for BMI required.      Physical Exam  Vitals and nursing note reviewed.   Constitutional:       General: She is not in acute distress.     Appearance: She is well-developed.      Comments: Frail appearing, skin is more pale than usual, but conversational, hard of hearing.   HENT:      Head: Normocephalic.      Nose: Nose normal.   Eyes:      General: No scleral icterus.  Cardiovascular:      Rate and Rhythm: Normal rate and regular rhythm.      Heart sounds: Normal heart sounds. No murmur heard.  Pulmonary:      Effort: Pulmonary effort is normal. No respiratory distress.      Comments: Fine crackles in the mid axillary line on the left side of the chest  Musculoskeletal:         General: Normal range of motion.   Skin:     General: Skin is warm and dry.      Findings: No rash.   Neurological:      Mental Status: She is alert and oriented to person, place, and time.   Psychiatric:         Behavior: Behavior normal.         Thought Content: Thought content normal.         Judgment: Judgment normal.        Result Review :            Unable to obtain 2 view chest x-ray, lateral view.  Therefore order changed to 1 view chest x-ray showing consolidation on the left lower lobe with scaring the costophrenic angle, possible pleural effusion that seems stable or similar to last week.  Overall the general congestion of the lungs seems to be improved, still with right-sided middle lobe consolidation similar to last week but slightly improved.  Official review pending.         Assessment and Plan     Diagnoses and all orders for this visit:    1. Shortness of breath (Primary)  -     Cancel: XR Chest PA & Lateral  -     XR Chest 1 View  -     Ambulatory Referral to Home Hospice  -     furosemide (Lasix) 40 MG tablet; Take 1 " tablet by mouth 2 (Two) Times a Day As Needed (shortness of breath) for up to 60 days.  Dispense: 60 tablet; Refill: 1    2. Acute combined systolic and diastolic congestive heart failure  -     Ambulatory Referral to Home Hospice    3. Congestive heart failure, unspecified HF chronicity, unspecified heart failure type  -     Ambulatory Referral to Home Hospice  -     furosemide (Lasix) 40 MG tablet; Take 1 tablet by mouth 2 (Two) Times a Day As Needed (shortness of breath) for up to 60 days.  Dispense: 60 tablet; Refill: 1    4. Hospital-acquired bacterial pneumonia  -     Ambulatory Referral to Home Hospice  -     amoxicillin-clavulanate (AUGMENTIN) 875-125 MG per tablet; Take 1 tablet by mouth 2 (Two) Times a Day for 7 days.  Dispense: 14 tablet; Refill: 0  -     azithromycin (ZITHROMAX) 250 MG tablet; Take 2 tablets the first day, then 1 tablet daily for 4 days.  Dispense: 6 tablet; Refill: 0    5. Chronic idiopathic constipation    Other orders  -     senna 8.6 MG tablet; Take 1 tablet by mouth 2 (Two) Times a Day. Indications: Constipation  Dispense: 60 tablet; Refill: 2    Very pleasant 89-year-old female here today with 2 of her daughters Yesy and Marisol.  She has complaints of 2 weeks of shortness of breath that I think is likely related to volume overload/heart failure.  On imaging x-ray today there is some interval improvement from last week but still ongoing substantial consolidation on the left side the left lower lobe and then also in the right middle lobe.  Some scattered lymph nodes.  Possible pleural effusion on the left side.    However, the patient states that she is ready to go and does not want to go to the ER, she does not want to oxygen but she would want treatment of a possible pneumonia and diuresis if this would help her breathe more comfortably.    Plan:  - Medication simplified, stopped almost all of her medications except carvedilol for A-fib and levothyroxine.  -Remaining medications  are as needed or if they give her improvement/comfort.  She is not in significant pain at the moment.  Okay to gradually wean off gabapentin.  -Treat pneumonia, completed Levaquin, transition to Augmentin and azithromycin as above  -Increase diuresis with Lasix 40 mg twice a day  -She is requesting to continue Gemtesa, it is possible that this medication is part of what has caused some of the volume retention but it gives her ability to sleep at night and decreases her fall risk at night so she would like to continue for now.  -Refer to hospice per her wishes       I spent 42 minutes caring for Barbie on this date of service. This time includes time spent by me in the following activities:preparing for the visit, reviewing tests, performing a medically appropriate examination and/or evaluation , counseling and educating the patient/family/caregiver, ordering medications, tests, or procedures, referring and communicating with other health care professionals , documenting information in the medical record, and care coordination  Follow Up     No follow-ups on file.  Patient was given instructions and counseling regarding her condition or for health maintenance advice. Please see specific information pulled into the AVS if appropriate.

## 2024-05-21 ENCOUNTER — HOME CARE VISIT (OUTPATIENT)
Dept: HOME HEALTH SERVICES | Facility: HOME HEALTHCARE | Age: 89
End: 2024-05-21
Payer: MEDICARE

## 2024-05-21 VITALS
DIASTOLIC BLOOD PRESSURE: 44 MMHG | OXYGEN SATURATION: 92 % | RESPIRATION RATE: 18 BRPM | TEMPERATURE: 98.7 F | HEART RATE: 91 BPM | SYSTOLIC BLOOD PRESSURE: 84 MMHG

## 2024-05-21 PROCEDURE — G0151 HHCP-SERV OF PT,EA 15 MIN: HCPCS | Performed by: PHYSICAL THERAPIST

## 2024-05-21 NOTE — CASE COMMUNICATION
Nikolai Perry,    Ms. Hernandez has been discharged from  PT at this time due to it not being safe to continue with her oxygen dropping so low.  Patient's BP was 84/44 today due to some dehydration from the lasix.  OT is coming out one more time for discharge and shower education.    Thank you,  Krista Lynn, PT, DPT  [facility]

## 2024-05-21 NOTE — HOME HEALTH
"Subjective: \"I went to the Md yesterday and they gave me new medication\"  Daughter informed therapy of new medication, at my MD appointment they attempted oxygen however after 40 minutes it had not improved her oxygen saturation.  Patient and family have discussed to her options and have decided to have pallative care come out and discontinue therapy services.    Changes in Medications: added antibiotic and double lasix    Any Falls Since Last Visit: none    Assessment: Patient had made some progress with HH PT with imporved transfer ability and home safety, patient had begun to ambulate outside however he oxygen saturation began to fall and she was diagnosed with pneumonia and CHF, patient had begun to decline.  At this time HH PT is not safe to continue at this time due to her low blood pressure and oxygen dropping down into the 60s with activity.  Patient and family were in agreement, patient wanted OT to come out one more time to do a shower and educate the caregiver in how to do her shower."

## 2024-05-22 ENCOUNTER — HOME CARE VISIT (OUTPATIENT)
Dept: HOME HEALTH SERVICES | Facility: HOME HEALTHCARE | Age: 89
End: 2024-05-22
Payer: MEDICARE

## 2024-05-22 VITALS
TEMPERATURE: 45.9 F | OXYGEN SATURATION: 98 % | HEART RATE: 94 BPM | RESPIRATION RATE: 18 BRPM | DIASTOLIC BLOOD PRESSURE: 58 MMHG | SYSTOLIC BLOOD PRESSURE: 124 MMHG

## 2024-05-22 PROCEDURE — G0152 HHCP-SERV OF OT,EA 15 MIN: HCPCS

## 2024-05-23 NOTE — HOME HEALTH
Patient said she has decided to go with [facility] services after they came yesterday and interviewed her and told her what they could offer.  She agreed to OT/agency DC today.

## 2024-05-23 NOTE — CASE COMMUNICATION
Patient said she has decided to go with [facility] services after they came yesterday and interviewed her and told her what they could offer.  She agreed to OT/agency DC today: 5/22/2024. Thank you for the referral on this very pleasant patient, it has been a sonja to work with her.

## 2024-05-24 DIAGNOSIS — N39.41 URGE INCONTINENCE: ICD-10-CM

## 2024-05-24 RX ORDER — VIBEGRON 75 MG/1
75 TABLET, FILM COATED ORAL DAILY
Qty: 90 TABLET | Refills: 2 | Status: SHIPPED | OUTPATIENT
Start: 2024-05-24

## 2024-05-24 NOTE — TELEPHONE ENCOUNTER
Caller: Barbie Hernandez SUNITHA    Relationship: Self    Best call back number: [phone]     Requested Prescriptions:   Requested Prescriptions     Pending Prescriptions Disp Refills    Vibegron (Gemtesa) 75 MG tablet 90 tablet 2     Sig: Take 1 tablet by mouth Daily. Indications: Overactive Bladder, Urinary Incontinence        Pharmacy where request should be sent: [facility] [id number] [address] [address] AT [facility] [address] - [phone]  - [phone]     Last office visit with prescribing clinician: 5/20/2024   Last telemedicine visit with prescribing clinician: Visit date not found   Next office visit with prescribing clinician: 6/17/2024     Additional details provided by patient: WILL NEED NEW PRESCRIPTION AND HAS TWO DAY SUPPLY LEFT.    Does the patient have less than a 3 day supply:  [x] Yes  [] No    Would you like a call back once the refill request has been completed: [] Yes [] No    If the office needs to give you a call back, can they leave a voicemail: [] Yes [] No    Avani Briggs Rep   05/24/24 09:35 EDT

## (undated) DEVICE — BONE TAMP KIT KEX102EB FF E2 10/2 OI: Brand: KYPHON EXPRESS II KYPHOPAK TRAY

## (undated) DEVICE — DRSNG SURESITE WNDW 2.38X2.75

## (undated) DEVICE — APPL CHLORAPREP HI/LITE 26ML ORNG

## (undated) DEVICE — NEEDLE, QUINCKE 22GX3.5": Brand: MEDLINE INDUSTRIES, INC.

## (undated) DEVICE — GLV SURG PREMIERPRO ORTHO LTX PF SZ8 BRN

## (undated) DEVICE — GOWN,NON-REINFORCED,SIRUS,SET IN SLV,XXL: Brand: MEDLINE

## (undated) DEVICE — DRP SLUSH WARMR MACH CIR 44X44IN

## (undated) DEVICE — BONE BIOPSY DEVICE F07A TAPERED SIZE 2: Brand: MEDTRONIC REUSABLE INSTRUMENTS

## (undated) DEVICE — DRSNG TELFA PAD NONADH STR 1S 3X4IN

## (undated) DEVICE — DRAPE,TOWEL,LARGE,INVISISHIELD: Brand: MEDLINE

## (undated) DEVICE — MIXER A07A CEMENT

## (undated) DEVICE — EQUIPMENT COVER BAG TYPE 48” X 36” (122CM X 91CM): Brand: EQUIPMENT COVER BAG TYPE

## (undated) DEVICE — MARKR SKIN W/RULR 2TP

## (undated) DEVICE — TOWEL,OR,DSP,ST,BLUE,STD,4/PK,20PK/CS: Brand: MEDLINE

## (undated) DEVICE — PK KYPHOPLASTY 40

## (undated) DEVICE — STPLR SKIN VISISTAT WD 35CT

## (undated) DEVICE — LABEL SHEET CUSTOM 2X2 YELLOW: Brand: MEDLINE INDUSTRIES, INC.